# Patient Record
Sex: MALE | Race: WHITE | NOT HISPANIC OR LATINO | Employment: OTHER | URBAN - NONMETROPOLITAN AREA
[De-identification: names, ages, dates, MRNs, and addresses within clinical notes are randomized per-mention and may not be internally consistent; named-entity substitution may affect disease eponyms.]

---

## 2023-03-20 PROBLEM — M25.551 RIGHT HIP PAIN: Status: ACTIVE | Noted: 2023-03-20

## 2023-03-20 PROBLEM — M25.552 LEFT HIP PAIN: Status: ACTIVE | Noted: 2023-03-20

## 2023-03-20 PROBLEM — M54.2 NECK PAIN: Status: ACTIVE | Noted: 2023-03-20

## 2023-03-20 PROBLEM — R39.15 URINARY URGENCY: Status: ACTIVE | Noted: 2023-03-20

## 2023-03-20 PROBLEM — N20.0 CALCULUS OF KIDNEY: Status: ACTIVE | Noted: 2023-03-20

## 2023-03-20 PROBLEM — N13.8 BENIGN PROSTATIC HYPERPLASIA WITH URINARY OBSTRUCTION AND OTHER LOWER URINARY TRACT SYMPTOMS: Status: ACTIVE | Noted: 2023-03-20

## 2023-03-20 PROBLEM — H40.9 GLAUCOMA: Status: ACTIVE | Noted: 2023-03-20

## 2023-03-20 PROBLEM — R30.0 DYSURIA: Status: ACTIVE | Noted: 2023-03-20

## 2023-03-20 PROBLEM — E87.6 HYPOKALEMIA: Status: ACTIVE | Noted: 2023-03-20

## 2023-03-20 PROBLEM — R97.20 ELEVATED PSA: Status: ACTIVE | Noted: 2023-03-20

## 2023-03-20 PROBLEM — R74.01 ELEVATED ALT MEASUREMENT: Status: ACTIVE | Noted: 2023-03-20

## 2023-03-20 PROBLEM — R35.1 NOCTURIA: Status: ACTIVE | Noted: 2023-03-20

## 2023-03-20 PROBLEM — R10.31 SEVERE RIGHT INGUINAL PAIN: Status: ACTIVE | Noted: 2023-03-20

## 2023-03-20 PROBLEM — Z99.89 AMBULATES WITH CANE: Status: ACTIVE | Noted: 2023-03-20

## 2023-03-20 PROBLEM — N18.30 STAGE 3 CHRONIC KIDNEY DISEASE (MULTI): Status: ACTIVE | Noted: 2023-03-20

## 2023-03-20 PROBLEM — R33.9 URINARY RETENTION: Status: ACTIVE | Noted: 2023-03-20

## 2023-03-20 PROBLEM — N52.9 ERECTILE DYSFUNCTION: Status: ACTIVE | Noted: 2023-03-20

## 2023-03-20 PROBLEM — K59.00 CONSTIPATION: Status: ACTIVE | Noted: 2023-03-20

## 2023-03-20 PROBLEM — N40.1 BENIGN PROSTATIC HYPERPLASIA WITH URINARY OBSTRUCTION AND OTHER LOWER URINARY TRACT SYMPTOMS: Status: ACTIVE | Noted: 2023-03-20

## 2023-03-20 PROBLEM — R35.0 URINARY FREQUENCY: Status: ACTIVE | Noted: 2023-03-20

## 2023-03-20 PROBLEM — E80.6 HYPERBILIRUBINEMIA: Status: ACTIVE | Noted: 2023-03-20

## 2023-03-20 PROBLEM — M43.02 CERVICAL SPONDYLOLYSIS: Status: ACTIVE | Noted: 2023-03-20

## 2023-03-20 PROBLEM — Z85.038 HISTORY OF COLON CANCER: Status: ACTIVE | Noted: 2023-03-20

## 2023-03-20 PROBLEM — N28.1 COMPLEX RENAL CYST: Status: ACTIVE | Noted: 2023-03-20

## 2023-03-20 PROBLEM — H35.30 MACULAR DEGENERATION: Status: ACTIVE | Noted: 2023-03-20

## 2023-03-20 PROBLEM — I10 HTN (HYPERTENSION): Status: ACTIVE | Noted: 2023-03-20

## 2023-03-20 RX ORDER — LOSARTAN POTASSIUM AND HYDROCHLOROTHIAZIDE 25; 100 MG/1; MG/1
1 TABLET ORAL DAILY
COMMUNITY
End: 2023-10-09 | Stop reason: SDUPTHER

## 2023-03-20 RX ORDER — POTASSIUM CHLORIDE 1500 MG/1
1 TABLET, EXTENDED RELEASE ORAL DAILY
COMMUNITY
Start: 2020-03-17 | End: 2024-03-25 | Stop reason: ALTCHOICE

## 2023-03-20 RX ORDER — FINASTERIDE 5 MG/1
1 TABLET, FILM COATED ORAL
COMMUNITY
Start: 2019-12-18 | End: 2024-01-16

## 2023-03-20 RX ORDER — TAMSULOSIN HYDROCHLORIDE 0.4 MG/1
1 CAPSULE ORAL NIGHTLY
COMMUNITY
Start: 2020-05-20 | End: 2024-01-16

## 2023-03-20 RX ORDER — LATANOPROST 50 UG/ML
SOLUTION/ DROPS OPHTHALMIC
COMMUNITY
Start: 2022-08-02

## 2023-03-20 RX ORDER — METOPROLOL TARTRATE 25 MG/1
1 TABLET, FILM COATED ORAL DAILY
COMMUNITY
Start: 2020-12-18 | End: 2023-10-09 | Stop reason: SDUPTHER

## 2023-03-20 RX ORDER — OXYBUTYNIN CHLORIDE 10 MG/1
1 TABLET, EXTENDED RELEASE ORAL DAILY
COMMUNITY
Start: 2021-02-24 | End: 2023-11-27

## 2023-03-20 RX ORDER — BIMATOPROST 0.1 MG/ML
SOLUTION/ DROPS OPHTHALMIC
COMMUNITY
Start: 2022-01-24

## 2023-03-21 ENCOUNTER — OFFICE VISIT (OUTPATIENT)
Dept: PRIMARY CARE | Facility: CLINIC | Age: 88
End: 2023-03-21
Payer: MEDICARE

## 2023-03-21 VITALS
HEIGHT: 66 IN | BODY MASS INDEX: 1.9 KG/M2 | HEART RATE: 67 BPM | OXYGEN SATURATION: 97 % | SYSTOLIC BLOOD PRESSURE: 130 MMHG | WEIGHT: 11.8 LBS | DIASTOLIC BLOOD PRESSURE: 70 MMHG

## 2023-03-21 DIAGNOSIS — M43.9 POSTURAL DEFORMITY: ICD-10-CM

## 2023-03-21 DIAGNOSIS — Z12.5 PROSTATE CANCER SCREENING: ICD-10-CM

## 2023-03-21 DIAGNOSIS — I10 PRIMARY HYPERTENSION: ICD-10-CM

## 2023-03-21 DIAGNOSIS — R74.01 ELEVATED ALT MEASUREMENT: ICD-10-CM

## 2023-03-21 DIAGNOSIS — M54.50 LOW BACK PAIN, UNSPECIFIED BACK PAIN LATERALITY, UNSPECIFIED CHRONICITY, UNSPECIFIED WHETHER SCIATICA PRESENT: Primary | ICD-10-CM

## 2023-03-21 DIAGNOSIS — R26.81 UNSTABLE GAIT: ICD-10-CM

## 2023-03-21 DIAGNOSIS — Z13.220 SCREENING FOR HYPERLIPIDEMIA: ICD-10-CM

## 2023-03-21 PROCEDURE — 1159F MED LIST DOCD IN RCRD: CPT | Performed by: STUDENT IN AN ORGANIZED HEALTH CARE EDUCATION/TRAINING PROGRAM

## 2023-03-21 PROCEDURE — 3075F SYST BP GE 130 - 139MM HG: CPT | Performed by: STUDENT IN AN ORGANIZED HEALTH CARE EDUCATION/TRAINING PROGRAM

## 2023-03-21 PROCEDURE — 3078F DIAST BP <80 MM HG: CPT | Performed by: STUDENT IN AN ORGANIZED HEALTH CARE EDUCATION/TRAINING PROGRAM

## 2023-03-21 PROCEDURE — 99214 OFFICE O/P EST MOD 30 MIN: CPT | Performed by: STUDENT IN AN ORGANIZED HEALTH CARE EDUCATION/TRAINING PROGRAM

## 2023-03-21 PROCEDURE — 1036F TOBACCO NON-USER: CPT | Performed by: STUDENT IN AN ORGANIZED HEALTH CARE EDUCATION/TRAINING PROGRAM

## 2023-03-21 ASSESSMENT — PATIENT HEALTH QUESTIONNAIRE - PHQ9
1. LITTLE INTEREST OR PLEASURE IN DOING THINGS: NOT AT ALL
SUM OF ALL RESPONSES TO PHQ9 QUESTIONS 1 AND 2: 0
2. FEELING DOWN, DEPRESSED OR HOPELESS: NOT AT ALL

## 2023-03-21 NOTE — PROGRESS NOTES
Subjective   Patient ID: Tommy Richmond is a 88 y.o. male who presents for med check (Medication check, /Leaning to one side (left side) ).    HPI    Patient here for follow up .     Reports that he is noticing that he is leaning  more to the left in the recent past. Progressively worsening. Unable to stand straight despite his efforts. Concerned about this.    Plan: xrays ordered. May need spine surgeon referral. Physical therapy ordered.     He is also helping his wife who has Alzheimer's disease. Reports that he wants to be as healthy as possible because he used to take care of both of them.     Reviewed previous labs. Elevated ALT. Repeat liver function shows elevated bilirubin along with elevated ALT. Ultrasound of the liver is ordered for further evaluation. Ultrasound of the liver did not show any abnormalities. However they cannot see the gallbladder. Patient reports that he has not had any cholecystectomy. Found renal calculi and right renal cyst. We will continue to monitor on this. Patient denies any symptoms at this time.     Hypertension: Blood pressure is under control. Denies chest pain, palpitation or shortness of breath.  We will continue current regimen    Review of Systems  ROS negative except discussed above in HPI.    Vitals:    03/21/23 1015   BP: 130/70   Pulse: 67   SpO2: 97%     Objective   Physical Exam  Marked deviation to the left is noticed. Unable to straiten despite efforts.     Assessment/Plan   Tommy was seen today for med check.  Diagnoses and all orders for this visit:  Low back pain, unspecified back pain laterality, unspecified chronicity, unspecified whether sciatica present (Primary)  -     Referral to Physical Therapy; Future  -     Cancel: XR thoracolumbar spine 2 views; Future  -     Cancel: XR thoracolumbar spine 2 views  -     XR lumbar spine 2-3 views  Primary hypertension  Elevated ALT measurement  -     Comprehensive Metabolic Panel; Future  Screening for  hyperlipidemia  -     Lipid Panel; Future  Prostate cancer screening  -     Prostate Specific Antigen, Screen; Future  Unstable gait  -     Referral to Physical Therapy; Future  Postural deformity  -     Referral to Physical Therapy; Future      Follow up in 6 months.     Brandan Mcclendon MD MPH

## 2023-04-27 ENCOUNTER — TELEPHONE (OUTPATIENT)
Dept: PRIMARY CARE | Facility: CLINIC | Age: 88
End: 2023-04-27

## 2023-09-01 ENCOUNTER — TELEMEDICINE (OUTPATIENT)
Dept: PRIMARY CARE | Facility: CLINIC | Age: 88
End: 2023-09-01
Payer: MEDICARE

## 2023-09-01 DIAGNOSIS — U07.1 COVID-19 VIRUS INFECTION: Primary | ICD-10-CM

## 2023-09-01 DIAGNOSIS — R05.1 ACUTE COUGH: ICD-10-CM

## 2023-09-01 PROCEDURE — 99213 OFFICE O/P EST LOW 20 MIN: CPT | Performed by: STUDENT IN AN ORGANIZED HEALTH CARE EDUCATION/TRAINING PROGRAM

## 2023-09-01 RX ORDER — BENZONATATE 200 MG/1
200 CAPSULE ORAL 3 TIMES DAILY PRN
Qty: 42 CAPSULE | Refills: 0 | Status: SHIPPED | OUTPATIENT
Start: 2023-09-01 | End: 2023-10-01

## 2023-09-01 ASSESSMENT — PATIENT HEALTH QUESTIONNAIRE - PHQ9
SUM OF ALL RESPONSES TO PHQ9 QUESTIONS 1 AND 2: 0
1. LITTLE INTEREST OR PLEASURE IN DOING THINGS: NOT AT ALL
2. FEELING DOWN, DEPRESSED OR HOPELESS: NOT AT ALL

## 2023-09-01 NOTE — PROGRESS NOTES
Subjective   Patient ID: Tommy Richmond is a 88 y.o. male who presents for Sick (PT and wife tested positive for COVID, does not know when sx started, he was in contact with son who has it, tested today and he has it. Some SOB for both. Some cough and congestion. ).    HPI    Patient presented with positive COVID test recently.  Reports that he has mild shortness of breath.  Has a dry cough as well.  Does not have a pulse oximeter to check oxygen saturation.  Denies nausea, vomiting or diarrhea.    Recommended to seek immediate medical attention if his shortness of breath is worsening.  Also recommended to get a pulse oximeter to check his oxygen level.  Discussed about Paxlovid which patient is agreeable to take.  Discussed about the potential side effects of the medication.    Review of Systems  ROS negative except discussed above in HPI.    There were no vitals filed for this visit.  Objective   Physical Exam  Unable to examine due to nature of the visit and    Assessment/Plan   Tommy was seen today for sick.  Diagnoses and all orders for this visit:  COVID-19 virus infection (Primary)  -     nirmatrelvir-ritonavir (PAXLOVID) 300 mg (150 mg x 2)-100 mg tablet therapy pack; Take 3 tablets by mouth 2 times a day for 5 days. Follow the instructions on the package  -     benzonatate (Tessalon) 200 mg capsule; Take 1 capsule (200 mg) by mouth 3 times a day as needed for cough. Do not crush or chew.  Acute cough  -     nirmatrelvir-ritonavir (PAXLOVID) 300 mg (150 mg x 2)-100 mg tablet therapy pack; Take 3 tablets by mouth 2 times a day for 5 days. Follow the instructions on the package  -     benzonatate (Tessalon) 200 mg capsule; Take 1 capsule (200 mg) by mouth 3 times a day as needed for cough. Do not crush or chew.      Follow up as needed         Brandan Mcclendon MD MPH

## 2023-09-19 ENCOUNTER — OFFICE VISIT (OUTPATIENT)
Dept: PRIMARY CARE | Facility: CLINIC | Age: 88
End: 2023-09-19
Payer: MEDICARE

## 2023-09-19 VITALS — DIASTOLIC BLOOD PRESSURE: 60 MMHG | OXYGEN SATURATION: 99 % | SYSTOLIC BLOOD PRESSURE: 110 MMHG | HEART RATE: 66 BPM

## 2023-09-19 DIAGNOSIS — R53.83 OTHER FATIGUE: ICD-10-CM

## 2023-09-19 DIAGNOSIS — Z13.31 DEPRESSION SCREENING: ICD-10-CM

## 2023-09-19 DIAGNOSIS — F32.A DEPRESSION, UNSPECIFIED DEPRESSION TYPE: ICD-10-CM

## 2023-09-19 DIAGNOSIS — Z71.89 ADVANCE CARE PLANNING: ICD-10-CM

## 2023-09-19 DIAGNOSIS — Z00.00 ROUTINE GENERAL MEDICAL EXAMINATION AT HEALTH CARE FACILITY: Primary | ICD-10-CM

## 2023-09-19 DIAGNOSIS — F41.9 ANXIETY: ICD-10-CM

## 2023-09-19 PROCEDURE — 99497 ADVNCD CARE PLAN 30 MIN: CPT | Performed by: STUDENT IN AN ORGANIZED HEALTH CARE EDUCATION/TRAINING PROGRAM

## 2023-09-19 PROCEDURE — 99214 OFFICE O/P EST MOD 30 MIN: CPT | Performed by: STUDENT IN AN ORGANIZED HEALTH CARE EDUCATION/TRAINING PROGRAM

## 2023-09-19 PROCEDURE — 1160F RVW MEDS BY RX/DR IN RCRD: CPT | Performed by: STUDENT IN AN ORGANIZED HEALTH CARE EDUCATION/TRAINING PROGRAM

## 2023-09-19 PROCEDURE — 3078F DIAST BP <80 MM HG: CPT | Performed by: STUDENT IN AN ORGANIZED HEALTH CARE EDUCATION/TRAINING PROGRAM

## 2023-09-19 PROCEDURE — 1170F FXNL STATUS ASSESSED: CPT | Performed by: STUDENT IN AN ORGANIZED HEALTH CARE EDUCATION/TRAINING PROGRAM

## 2023-09-19 PROCEDURE — 1158F ADVNC CARE PLAN TLK DOCD: CPT | Performed by: STUDENT IN AN ORGANIZED HEALTH CARE EDUCATION/TRAINING PROGRAM

## 2023-09-19 PROCEDURE — 1036F TOBACCO NON-USER: CPT | Performed by: STUDENT IN AN ORGANIZED HEALTH CARE EDUCATION/TRAINING PROGRAM

## 2023-09-19 PROCEDURE — 3074F SYST BP LT 130 MM HG: CPT | Performed by: STUDENT IN AN ORGANIZED HEALTH CARE EDUCATION/TRAINING PROGRAM

## 2023-09-19 PROCEDURE — G0439 PPPS, SUBSEQ VISIT: HCPCS | Performed by: STUDENT IN AN ORGANIZED HEALTH CARE EDUCATION/TRAINING PROGRAM

## 2023-09-19 PROCEDURE — 1159F MED LIST DOCD IN RCRD: CPT | Performed by: STUDENT IN AN ORGANIZED HEALTH CARE EDUCATION/TRAINING PROGRAM

## 2023-09-19 PROCEDURE — G0444 DEPRESSION SCREEN ANNUAL: HCPCS | Performed by: STUDENT IN AN ORGANIZED HEALTH CARE EDUCATION/TRAINING PROGRAM

## 2023-09-19 RX ORDER — SERTRALINE HYDROCHLORIDE 25 MG/1
25 TABLET, FILM COATED ORAL DAILY
Qty: 30 TABLET | Refills: 1 | Status: SHIPPED | OUTPATIENT
Start: 2023-09-19 | End: 2023-10-10 | Stop reason: ALTCHOICE

## 2023-09-19 RX ORDER — HYDROXYZINE HYDROCHLORIDE 25 MG/1
25 TABLET, FILM COATED ORAL EVERY 8 HOURS PRN
Qty: 60 TABLET | Refills: 0 | Status: SHIPPED | OUTPATIENT
Start: 2023-09-19 | End: 2024-04-29

## 2023-09-19 ASSESSMENT — ACTIVITIES OF DAILY LIVING (ADL)
MANAGING_FINANCES: NEEDS ASSISTANCE
TAKING_MEDICATION: INDEPENDENT
DRESSING: INDEPENDENT
GROCERY_SHOPPING: NEEDS ASSISTANCE
BATHING: INDEPENDENT
DOING_HOUSEWORK: NEEDS ASSISTANCE

## 2023-09-19 ASSESSMENT — ENCOUNTER SYMPTOMS
LOSS OF SENSATION IN FEET: 0
OCCASIONAL FEELINGS OF UNSTEADINESS: 1
DEPRESSION: 1

## 2023-09-19 NOTE — PROGRESS NOTES
.Subjective   Reason for Visit: Tommy Richmond is an 88 y.o. male here for a Medicare Wellness visit.     Past Medical, Surgical, and Family History reviewed and updated in chart.         Chief Complaint   Patient presents with    Medicare Annual Wellness Visit Subsequent     6 mth ov  Discuss, depression, dehydration. Has panic attacks. Tested positive for covid 6-7 days ago     HPI    Patient is here for follow up.     Depression: Family noticed that the patient is depressed.   Starting Zoloft.     Anxiety: Atarax as needed.     Dehydration: has been trying to increase fluid intake.     Depression Screening done  Advanced Directives Discussion Completed    Patient Care Team:  Brandan Mcclendon MD MPH as PCP - General  Brandan Mcclendon MD MPH as PCP - [employer] PCP     Review of Systems  ROS negative except discussed above in HPI.    Objective   Vitals:  /60 (BP Location: Left arm, Patient Position: Sitting)   Pulse 66   SpO2 99%       Physical Exam  Constitutional:       Appearance: Normal appearance.   Cardiovascular:      Rate and Rhythm: Normal rate and regular rhythm.   Pulmonary:      Effort: Pulmonary effort is normal.      Breath sounds: Normal breath sounds.   Musculoskeletal:      Cervical back: Normal range of motion and neck supple.   Lymphadenopathy:      Cervical: No cervical adenopathy.   Neurological:      Mental Status: He is alert.       Assessment/Plan   Problem List Items Addressed This Visit    None  Visit Diagnoses       Routine general medical examination at health care facility    -  Primary    Depression, unspecified depression type        Relevant Medications    sertraline (Zoloft) 25 mg tablet    Anxiety        Relevant Medications    hydrOXYzine HCL (Atarax) 25 mg tablet    Other fatigue        Relevant Orders    Comprehensive Metabolic Panel    CBC and Auto Differential    Depression screening [Z13.31]        Advance care planning [Z71.89]                   Follow up in 3-4 weeks.

## 2023-09-19 NOTE — ACP (ADVANCE CARE PLANNING)
Confirming Previous Code Status:   Advance Care Planning Note     Discussion Date: 09/19/23   Discussion Participants: patient    The patient wishes to discuss Advance Care Planning today and the following is a brief summary of our discussion.     Patient has capacity to make their own medical decisions: Yes  Health Care Agent/Surrogate Decision Maker documented in chart: Yes    Documents on file and valid:  Advance Directive/Living Will: Yes   Health Care Power of : Yes    Communication of Medical Status/Prognosis:   Martine Richmond      Communication of Treatment Goals/Options:   Martine Richmond     Treatment Decisions  Goals of Care: survival is paramount regardless of prognosis, treatment outcome, or burden     Follow Up Plan  1 year  Team Members   - Urologist.     Brandan Mcclendon MD MPH  9/19/2023 3:46 PM

## 2023-09-19 NOTE — PATIENT INSTRUCTIONS
You can look into Rollators, so that you can use it to carry things around and can sit if needed.

## 2023-09-20 ENCOUNTER — LAB (OUTPATIENT)
Dept: LAB | Facility: LAB | Age: 88
End: 2023-09-20
Payer: MEDICARE

## 2023-09-20 DIAGNOSIS — R74.01 ELEVATED ALT MEASUREMENT: ICD-10-CM

## 2023-09-20 DIAGNOSIS — Z12.5 PROSTATE CANCER SCREENING: ICD-10-CM

## 2023-09-20 DIAGNOSIS — R53.83 OTHER FATIGUE: ICD-10-CM

## 2023-09-20 DIAGNOSIS — Z13.220 SCREENING FOR HYPERLIPIDEMIA: ICD-10-CM

## 2023-09-20 LAB
ALANINE AMINOTRANSFERASE (SGPT) (U/L) IN SER/PLAS: 62 U/L (ref 10–52)
ALBUMIN (G/DL) IN SER/PLAS: 3.4 G/DL (ref 3.4–5)
ALKALINE PHOSPHATASE (U/L) IN SER/PLAS: 124 U/L (ref 33–136)
ANION GAP IN SER/PLAS: 10 MMOL/L (ref 10–20)
ASPARTATE AMINOTRANSFERASE (SGOT) (U/L) IN SER/PLAS: 44 U/L (ref 9–39)
BASOPHILS (10*3/UL) IN BLOOD BY AUTOMATED COUNT: 0.02 X10E9/L (ref 0–0.1)
BASOPHILS/100 LEUKOCYTES IN BLOOD BY AUTOMATED COUNT: 0.4 % (ref 0–2)
BILIRUBIN TOTAL (MG/DL) IN SER/PLAS: 0.8 MG/DL (ref 0–1.2)
CALCIUM (MG/DL) IN SER/PLAS: 9.1 MG/DL (ref 8.6–10.3)
CARBON DIOXIDE, TOTAL (MMOL/L) IN SER/PLAS: 25 MMOL/L (ref 21–32)
CHLORIDE (MMOL/L) IN SER/PLAS: 106 MMOL/L (ref 98–107)
CHOLESTEROL (MG/DL) IN SER/PLAS: 119 MG/DL (ref 0–199)
CHOLESTEROL IN HDL (MG/DL) IN SER/PLAS: 53 MG/DL
CHOLESTEROL/HDL RATIO: 2.2
CREATININE (MG/DL) IN SER/PLAS: 1.06 MG/DL (ref 0.5–1.3)
EOSINOPHILS (10*3/UL) IN BLOOD BY AUTOMATED COUNT: 0.03 X10E9/L (ref 0–0.4)
EOSINOPHILS/100 LEUKOCYTES IN BLOOD BY AUTOMATED COUNT: 0.5 % (ref 0–6)
ERYTHROCYTE DISTRIBUTION WIDTH (RATIO) BY AUTOMATED COUNT: 14.2 % (ref 11.5–14.5)
ERYTHROCYTE MEAN CORPUSCULAR HEMOGLOBIN CONCENTRATION (G/DL) BY AUTOMATED: 32.7 G/DL (ref 32–36)
ERYTHROCYTE MEAN CORPUSCULAR VOLUME (FL) BY AUTOMATED COUNT: 91 FL (ref 80–100)
ERYTHROCYTES (10*6/UL) IN BLOOD BY AUTOMATED COUNT: 4.33 X10E12/L (ref 4.5–5.9)
GFR MALE: 68 ML/MIN/1.73M2
GLUCOSE (MG/DL) IN SER/PLAS: 100 MG/DL (ref 74–99)
HEMATOCRIT (%) IN BLOOD BY AUTOMATED COUNT: 39.4 % (ref 41–52)
HEMOGLOBIN (G/DL) IN BLOOD: 12.9 G/DL (ref 13.5–17.5)
IMMATURE GRANULOCYTES/100 LEUKOCYTES IN BLOOD BY AUTOMATED COUNT: 0.2 % (ref 0–0.9)
LDL: 51 MG/DL (ref 0–99)
LEUKOCYTES (10*3/UL) IN BLOOD BY AUTOMATED COUNT: 5.6 X10E9/L (ref 4.4–11.3)
LYMPHOCYTES (10*3/UL) IN BLOOD BY AUTOMATED COUNT: 1.2 X10E9/L (ref 0.8–3)
LYMPHOCYTES/100 LEUKOCYTES IN BLOOD BY AUTOMATED COUNT: 21.4 % (ref 13–44)
MONOCYTES (10*3/UL) IN BLOOD BY AUTOMATED COUNT: 0.31 X10E9/L (ref 0.05–0.8)
MONOCYTES/100 LEUKOCYTES IN BLOOD BY AUTOMATED COUNT: 5.5 % (ref 2–10)
NEUTROPHILS (10*3/UL) IN BLOOD BY AUTOMATED COUNT: 4.04 X10E9/L (ref 1.6–5.5)
NEUTROPHILS/100 LEUKOCYTES IN BLOOD BY AUTOMATED COUNT: 72 % (ref 40–80)
PLATELETS (10*3/UL) IN BLOOD AUTOMATED COUNT: 262 X10E9/L (ref 150–450)
POTASSIUM (MMOL/L) IN SER/PLAS: 3.9 MMOL/L (ref 3.5–5.3)
PROSTATE SPECIFIC ANTIGEN,SCREEN: 12.93 NG/ML (ref 0–4)
PROTEIN TOTAL: 5.8 G/DL (ref 6.4–8.2)
SODIUM (MMOL/L) IN SER/PLAS: 137 MMOL/L (ref 136–145)
TRIGLYCERIDE (MG/DL) IN SER/PLAS: 73 MG/DL (ref 0–149)
UREA NITROGEN (MG/DL) IN SER/PLAS: 25 MG/DL (ref 6–23)
VLDL: 15 MG/DL (ref 0–40)

## 2023-09-20 PROCEDURE — 36415 COLL VENOUS BLD VENIPUNCTURE: CPT

## 2023-09-20 PROCEDURE — 85025 COMPLETE CBC W/AUTO DIFF WBC: CPT

## 2023-09-20 PROCEDURE — 80061 LIPID PANEL: CPT

## 2023-09-20 PROCEDURE — G0103 PSA SCREENING: HCPCS

## 2023-09-20 PROCEDURE — 80053 COMPREHEN METABOLIC PANEL: CPT

## 2023-09-25 ENCOUNTER — APPOINTMENT (OUTPATIENT)
Dept: PRIMARY CARE | Facility: CLINIC | Age: 88
End: 2023-09-25
Payer: MEDICARE

## 2023-09-26 ENCOUNTER — TELEPHONE (OUTPATIENT)
Dept: PRIMARY CARE | Facility: CLINIC | Age: 88
End: 2023-09-26
Payer: MEDICARE

## 2023-09-27 NOTE — TELEPHONE ENCOUNTER
Would like to ask some questions about how to take a new medications that he was prescribed- states some of the side effects increase symptoms of glaucoma and he's anxious about that, please call

## 2023-10-05 ENCOUNTER — TELEPHONE (OUTPATIENT)
Dept: PRIMARY CARE | Facility: CLINIC | Age: 88
End: 2023-10-05
Payer: MEDICARE

## 2023-10-05 DIAGNOSIS — I10 HYPERTENSION, UNSPECIFIED TYPE: ICD-10-CM

## 2023-10-05 NOTE — TELEPHONE ENCOUNTER
Patient son called in regarding Zoloft Prescription he was prescribed on his last visit that has side effects with his eyesight (glaucoma & Macular degeneration) would like to discuss other options for medication.

## 2023-10-05 NOTE — TELEPHONE ENCOUNTER
Asking if he can change his 10/10 appointment to a VV instead of in office. Asking for a call back.

## 2023-10-09 RX ORDER — METOPROLOL TARTRATE 25 MG/1
25 TABLET, FILM COATED ORAL DAILY
Qty: 90 TABLET | Refills: 1 | Status: SHIPPED | OUTPATIENT
Start: 2023-10-09 | End: 2023-10-20 | Stop reason: SDUPTHER

## 2023-10-09 RX ORDER — LOSARTAN POTASSIUM AND HYDROCHLOROTHIAZIDE 25; 100 MG/1; MG/1
1 TABLET ORAL DAILY
Qty: 90 TABLET | Refills: 1 | Status: SHIPPED | OUTPATIENT
Start: 2023-10-09 | End: 2023-10-20 | Stop reason: SDUPTHER

## 2023-10-10 ENCOUNTER — TELEMEDICINE (OUTPATIENT)
Dept: PRIMARY CARE | Facility: CLINIC | Age: 88
End: 2023-10-10
Payer: MEDICARE

## 2023-10-10 DIAGNOSIS — F41.9 ANXIETY: Primary | ICD-10-CM

## 2023-10-10 DIAGNOSIS — Z13.31 DEPRESSION SCREENING: ICD-10-CM

## 2023-10-10 PROCEDURE — 99213 OFFICE O/P EST LOW 20 MIN: CPT | Performed by: STUDENT IN AN ORGANIZED HEALTH CARE EDUCATION/TRAINING PROGRAM

## 2023-10-10 RX ORDER — BUSPIRONE HYDROCHLORIDE 5 MG/1
5 TABLET ORAL 2 TIMES DAILY
Qty: 60 TABLET | Refills: 11 | Status: SHIPPED | OUTPATIENT
Start: 2023-10-10 | End: 2024-10-09

## 2023-10-10 NOTE — PROGRESS NOTES
Subjective   Patient ID: Tommy Richmond is a 88 y.o. male who presents for 3 week ov (Wondering if there is other medication options for sertraline.).    HPI    Patient is here for follow-up regarding depression and anxiety.  Reports that he has not started taking Zoloft as there is concern for worsening glaucoma.  He already has glaucoma and was concerned so was not started taking the medication.  Changing the medication to buspirone.  Most other medications had side effect of glaucoma.    Review of Systems  ROS negative except discussed above in HPI.    There were no vitals filed for this visit.  Objective   Physical Exam  Unable to perform examination due to the nature of the visit.    Assessment/Plan   Tommy was seen today for 3 week ov.  Diagnoses and all orders for this visit:  Anxiety (Primary)  -     busPIRone (Buspar) 5 mg tablet; Take 1 tablet (5 mg) by mouth 2 times a day.  Depression screening  -     busPIRone (Buspar) 5 mg tablet; Take 1 tablet (5 mg) by mouth 2 times a day.      Follow up in 4 weeks         Brandan Mcclendon MD MPH

## 2023-10-13 ENCOUNTER — TELEPHONE (OUTPATIENT)
Dept: PRIMARY CARE | Facility: CLINIC | Age: 88
End: 2023-10-13
Payer: MEDICARE

## 2023-10-13 NOTE — TELEPHONE ENCOUNTER
Pt son Yury has a couple questions regarding new medication prescribed for his dad -on the information sheet  for the depression medication it says it is for anxiety and they are not sure why he was prescribed medication for high BP   Please call him to discuss

## 2023-10-20 ENCOUNTER — TELEPHONE (OUTPATIENT)
Dept: PRIMARY CARE | Facility: CLINIC | Age: 88
End: 2023-10-20
Payer: MEDICARE

## 2023-10-20 DIAGNOSIS — I10 HYPERTENSION, UNSPECIFIED TYPE: ICD-10-CM

## 2023-10-20 RX ORDER — METOPROLOL TARTRATE 25 MG/1
25 TABLET, FILM COATED ORAL DAILY
Qty: 90 TABLET | Refills: 1 | Status: SHIPPED | OUTPATIENT
Start: 2023-10-20 | End: 2024-01-12

## 2023-10-20 RX ORDER — LOSARTAN POTASSIUM AND HYDROCHLOROTHIAZIDE 25; 100 MG/1; MG/1
1 TABLET ORAL DAILY
Qty: 90 TABLET | Refills: 1 | Status: SHIPPED | OUTPATIENT
Start: 2023-10-20

## 2023-10-20 NOTE — TELEPHONE ENCOUNTER
Needs a refill on [facility]artan [facility]- states he has been trying to get a refill since 10/9

## 2023-11-01 ENCOUNTER — OFFICE VISIT (OUTPATIENT)
Dept: UROLOGY | Facility: CLINIC | Age: 88
End: 2023-11-01
Payer: MEDICARE

## 2023-11-01 DIAGNOSIS — N39.0 RECURRENT UTI: Primary | ICD-10-CM

## 2023-11-01 PROCEDURE — 1158F ADVNC CARE PLAN TLK DOCD: CPT | Performed by: UROLOGY

## 2023-11-01 PROCEDURE — 3078F DIAST BP <80 MM HG: CPT | Performed by: UROLOGY

## 2023-11-01 PROCEDURE — 3074F SYST BP LT 130 MM HG: CPT | Performed by: UROLOGY

## 2023-11-01 PROCEDURE — 1160F RVW MEDS BY RX/DR IN RCRD: CPT | Performed by: UROLOGY

## 2023-11-01 PROCEDURE — 99211 OFF/OP EST MAY X REQ PHY/QHP: CPT | Performed by: UROLOGY

## 2023-11-01 PROCEDURE — 1036F TOBACCO NON-USER: CPT | Performed by: UROLOGY

## 2023-11-01 PROCEDURE — 1159F MED LIST DOCD IN RCRD: CPT | Performed by: UROLOGY

## 2023-11-01 NOTE — PROGRESS NOTES
Patient ID: Tommy Richmond is a 88 y.o. male.    Procedures   The patient was prepped and draped in the standard fashion.  Lidociane jelly was used on the urethral   Meatus.  A 16 Estonian red rubber catheter was placed into the bladder.  The bladder was drained.  The bladder was  Then irrigated several times until clear without mucous.  Aprroximately 60ml of a saline and _____BETADINE____________  Solutions was left in the bladder.  The catheter was removed and patient  was asked to hold the solution in the  Bladder as long as possible    FOLLOW UP CYSTO IN 1 MONTH

## 2023-11-08 ENCOUNTER — TELEPHONE (OUTPATIENT)
Dept: PRIMARY CARE | Facility: CLINIC | Age: 88
End: 2023-11-08
Payer: MEDICARE

## 2023-11-08 NOTE — TELEPHONE ENCOUNTER
Has an appointment 11/10, but doesn't think he needs the appointment. He thinks it's for the anxiety medication, but hasn't used the medication yet. Asking if he can cancel the appointment. Asking for a call back.

## 2023-11-10 ENCOUNTER — APPOINTMENT (OUTPATIENT)
Dept: PRIMARY CARE | Facility: CLINIC | Age: 88
End: 2023-11-10
Payer: MEDICARE

## 2023-11-22 DIAGNOSIS — R30.0 DYSURIA: ICD-10-CM

## 2023-11-27 RX ORDER — OXYBUTYNIN CHLORIDE 10 MG/1
10 TABLET, EXTENDED RELEASE ORAL DAILY
Qty: 90 TABLET | Refills: 3 | Status: SHIPPED | OUTPATIENT
Start: 2023-11-27 | End: 2024-11-26

## 2023-11-27 NOTE — TELEPHONE ENCOUNTER
[facility] CLD ABOUT PRESCRIPTION FOR   oxybutynin XL (Ditropan-XL) 10 mg 24 hr tablet WANTS A 90 DAY SUPPLY. NEEDS TO HAVE APPROVAL FAXED [phone]

## 2024-01-10 ENCOUNTER — APPOINTMENT (OUTPATIENT)
Dept: UROLOGY | Facility: CLINIC | Age: 89
End: 2024-01-10
Payer: MEDICARE

## 2024-01-10 ENCOUNTER — ANCILLARY PROCEDURE (OUTPATIENT)
Dept: RADIOLOGY | Facility: CLINIC | Age: 89
End: 2024-01-10
Payer: MEDICARE

## 2024-01-10 ENCOUNTER — OFFICE VISIT (OUTPATIENT)
Dept: PRIMARY CARE | Facility: CLINIC | Age: 89
End: 2024-01-10
Payer: MEDICARE

## 2024-01-10 VITALS
DIASTOLIC BLOOD PRESSURE: 70 MMHG | WEIGHT: 154.7 LBS | HEART RATE: 70 BPM | BODY MASS INDEX: 24.97 KG/M2 | SYSTOLIC BLOOD PRESSURE: 132 MMHG | OXYGEN SATURATION: 94 %

## 2024-01-10 DIAGNOSIS — M40.205 KYPHOSIS OF THORACOLUMBAR REGION, UNSPECIFIED KYPHOSIS TYPE: ICD-10-CM

## 2024-01-10 DIAGNOSIS — M54.50 LOW BACK PAIN, UNSPECIFIED BACK PAIN LATERALITY, UNSPECIFIED CHRONICITY, UNSPECIFIED WHETHER SCIATICA PRESENT: ICD-10-CM

## 2024-01-10 DIAGNOSIS — L98.9 SKIN LESION OF RIGHT LOWER EXTREMITY: ICD-10-CM

## 2024-01-10 DIAGNOSIS — M54.50 LOW BACK PAIN, UNSPECIFIED BACK PAIN LATERALITY, UNSPECIFIED CHRONICITY, UNSPECIFIED WHETHER SCIATICA PRESENT: Primary | ICD-10-CM

## 2024-01-10 PROCEDURE — 1158F ADVNC CARE PLAN TLK DOCD: CPT | Performed by: STUDENT IN AN ORGANIZED HEALTH CARE EDUCATION/TRAINING PROGRAM

## 2024-01-10 PROCEDURE — 1036F TOBACCO NON-USER: CPT | Performed by: STUDENT IN AN ORGANIZED HEALTH CARE EDUCATION/TRAINING PROGRAM

## 2024-01-10 PROCEDURE — 1160F RVW MEDS BY RX/DR IN RCRD: CPT | Performed by: STUDENT IN AN ORGANIZED HEALTH CARE EDUCATION/TRAINING PROGRAM

## 2024-01-10 PROCEDURE — 3075F SYST BP GE 130 - 139MM HG: CPT | Performed by: STUDENT IN AN ORGANIZED HEALTH CARE EDUCATION/TRAINING PROGRAM

## 2024-01-10 PROCEDURE — 3078F DIAST BP <80 MM HG: CPT | Performed by: STUDENT IN AN ORGANIZED HEALTH CARE EDUCATION/TRAINING PROGRAM

## 2024-01-10 PROCEDURE — 99214 OFFICE O/P EST MOD 30 MIN: CPT | Performed by: STUDENT IN AN ORGANIZED HEALTH CARE EDUCATION/TRAINING PROGRAM

## 2024-01-10 PROCEDURE — 1159F MED LIST DOCD IN RCRD: CPT | Performed by: STUDENT IN AN ORGANIZED HEALTH CARE EDUCATION/TRAINING PROGRAM

## 2024-01-10 NOTE — PROGRESS NOTES
Subjective   Patient ID: Tommy Richmond is a 88 y.o. male who presents for Pain (PT is here today for right ankle pain).    HPI    Skin lesion in the right ankle: Reports that he noticed skin lesion in the right ankle area. This is growing slowly. Reports he noticed some blood but does not know where it came from. Has mild pain associated with it in the area. Reports last night it started to feel better,   Plan: Bleeding likely from the wound. Skin lesion is about 1 x 2 cm with significant scab on the wound. Discussed conservative measures. Referral to dermatology.     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 Cannot straighten anymore.Unable to see things in the shelves.  reports he would also like an x-ray of his back, does not want to do PT feels like it does not work well.  Plan: xray ordered. Referral to spine surgeon.     Review of Systems  ROS negative except discussed above in HPI.    Vitals:    01/10/24 1124   BP: 132/70   Pulse: 70   SpO2: 94%     Objective   Physical Exam  Musculoskeletal:      Comments: Significant kyphosis noticed in both thoracic and lumbar spine. Unstable gait.   Uses walker to ambulate.    Skin:     Comments: Skin lesion above the lateral malleolar area of the right ankle. Lesion is covered with thick scab. No active bleeding is noticed.    Neurological:      Mental Status: He is alert.       Assessment/Plan   Tommy was seen today for pain.  Diagnoses and all orders for this visit:  Low back pain, unspecified back pain laterality, unspecified chronicity, unspecified whether sciatica present (Primary)  -     XR thoracic spine 3 views; Future  -     XR lumbar spine 4+ views w flexion extension; Future  -     Referral to Spine Surgery; Future  Kyphosis of thoracolumbar region, unspecified kyphosis type  -     XR thoracic spine 3 views; Future  -     XR lumbar spine 4+ views w flexion extension; Future  -     Referral to Spine Surgery; Future  Skin  lesion of right lower extremity  -     Referral to Dermatology      Follow up in 3 months.      Brandan Mcclendon MD MPH

## 2024-01-11 ENCOUNTER — TELEPHONE (OUTPATIENT)
Dept: PRIMARY CARE | Facility: CLINIC | Age: 89
End: 2024-01-11
Payer: MEDICARE

## 2024-01-11 NOTE — TELEPHONE ENCOUNTER
Patient tried to get xray at [facility] location of his spine, but the technition said he's too bent over. With Dr. Mcclendon being out asking if they should wait until he's back in the office tomorrow to try again. Asking for a call back at [phone]

## 2024-01-12 ENCOUNTER — APPOINTMENT (OUTPATIENT)
Dept: PRIMARY CARE | Facility: CLINIC | Age: 89
End: 2024-01-12
Payer: MEDICARE

## 2024-01-12 DIAGNOSIS — I10 HYPERTENSION, UNSPECIFIED TYPE: ICD-10-CM

## 2024-01-12 DIAGNOSIS — N40.1 BENIGN PROSTATIC HYPERPLASIA WITH URINARY OBSTRUCTION AND OTHER LOWER URINARY TRACT SYMPTOMS: ICD-10-CM

## 2024-01-12 DIAGNOSIS — N13.8 BENIGN PROSTATIC HYPERPLASIA WITH URINARY OBSTRUCTION AND OTHER LOWER URINARY TRACT SYMPTOMS: ICD-10-CM

## 2024-01-12 RX ORDER — POTASSIUM CHLORIDE 20 MEQ/1
20 TABLET, EXTENDED RELEASE ORAL DAILY
Qty: 90 TABLET | Refills: 3 | Status: SHIPPED | OUTPATIENT
Start: 2024-01-12

## 2024-01-12 RX ORDER — METOPROLOL TARTRATE 25 MG/1
25 TABLET, FILM COATED ORAL DAILY
Qty: 90 TABLET | Refills: 3 | Status: SHIPPED | OUTPATIENT
Start: 2024-01-12

## 2024-01-15 ENCOUNTER — TELEPHONE (OUTPATIENT)
Dept: PRIMARY CARE | Facility: CLINIC | Age: 89
End: 2024-01-15
Payer: MEDICARE

## 2024-01-16 RX ORDER — FINASTERIDE 5 MG/1
5 TABLET, FILM COATED ORAL DAILY
Qty: 90 TABLET | Refills: 3 | Status: SHIPPED | OUTPATIENT
Start: 2024-01-16

## 2024-01-16 RX ORDER — TAMSULOSIN HYDROCHLORIDE 0.4 MG/1
0.4 CAPSULE ORAL NIGHTLY
Qty: 90 CAPSULE | Refills: 3 | Status: SHIPPED | OUTPATIENT
Start: 2024-01-16

## 2024-01-18 NOTE — TELEPHONE ENCOUNTER
Patient would very much like a call back about xrays needed. We have been waiting on xray to get back with us about recommended test needed

## 2024-01-19 DIAGNOSIS — M40.205 KYPHOSIS OF THORACOLUMBAR REGION, UNSPECIFIED KYPHOSIS TYPE: ICD-10-CM

## 2024-01-19 DIAGNOSIS — M54.50 LOW BACK PAIN, UNSPECIFIED BACK PAIN LATERALITY, UNSPECIFIED CHRONICITY, UNSPECIFIED WHETHER SCIATICA PRESENT: Primary | ICD-10-CM

## 2024-01-22 ENCOUNTER — HOSPITAL ENCOUNTER (OUTPATIENT)
Dept: RADIOLOGY | Facility: CLINIC | Age: 89
Discharge: HOME | End: 2024-01-22
Payer: MEDICARE

## 2024-01-22 ENCOUNTER — HOSPITAL ENCOUNTER (OUTPATIENT)
Dept: RADIOLOGY | Facility: HOSPITAL | Age: 89
Discharge: HOME | End: 2024-01-22
Payer: MEDICARE

## 2024-01-22 DIAGNOSIS — M40.205 KYPHOSIS OF THORACOLUMBAR REGION, UNSPECIFIED KYPHOSIS TYPE: ICD-10-CM

## 2024-01-22 DIAGNOSIS — M54.50 LOW BACK PAIN, UNSPECIFIED BACK PAIN LATERALITY, UNSPECIFIED CHRONICITY, UNSPECIFIED WHETHER SCIATICA PRESENT: ICD-10-CM

## 2024-01-22 PROCEDURE — 72072 X-RAY EXAM THORAC SPINE 3VWS: CPT | Performed by: RADIOLOGY

## 2024-01-22 PROCEDURE — 72100 X-RAY EXAM L-S SPINE 2/3 VWS: CPT | Performed by: RADIOLOGY

## 2024-01-22 PROCEDURE — 72072 X-RAY EXAM THORAC SPINE 3VWS: CPT

## 2024-01-22 PROCEDURE — 72100 X-RAY EXAM L-S SPINE 2/3 VWS: CPT

## 2024-01-23 NOTE — PROGRESS NOTES
RECURRENT UTI  Patient ID: Tommy Richmond is a 89 y.o. male.    Procedures  The patient was prepped using a Betadine solution. Lidocaine jelly was instilled into the urethra. The flexible cystoscope was sterilely inserted into the urethra and formal cystoscopy performed in a systematic fashion. . For detailed findings of the procedure, please see Dr. Cavanaugh remarks below  CIPRO 250MG POBID TIMES 3 DAYS GIVEN    PSA 12.93(09/20/2023)   10.20 (3/9/2022)   7.64 (9/7/2021  PATIENT HAS HAD 2 NEGATIVE BIOPSIES        No mass. Large prostate with median lobe, no stone. Some mucous but improved from prior visits    Continue Irrigations-no recent UTI    F/U 3 /24 with psa.

## 2024-01-24 ENCOUNTER — PROCEDURE VISIT (OUTPATIENT)
Dept: UROLOGY | Facility: CLINIC | Age: 89
End: 2024-01-24
Payer: MEDICARE

## 2024-01-24 DIAGNOSIS — N39.0 RECURRENT UTI: Primary | ICD-10-CM

## 2024-01-24 PROCEDURE — 52000 CYSTOURETHROSCOPY: CPT | Performed by: UROLOGY

## 2024-01-24 RX ORDER — CIPROFLOXACIN 250 MG/1
250 TABLET, FILM COATED ORAL 2 TIMES DAILY
Qty: 6 TABLET | Refills: 0 | Status: SHIPPED | OUTPATIENT
Start: 2024-01-24 | End: 2024-01-27

## 2024-01-29 DIAGNOSIS — M85.80 OSTEOPENIA, UNSPECIFIED LOCATION: Primary | ICD-10-CM

## 2024-01-29 DIAGNOSIS — M48.50XA: ICD-10-CM

## 2024-01-31 ENCOUNTER — HOSPITAL ENCOUNTER (OUTPATIENT)
Dept: RADIOLOGY | Facility: CLINIC | Age: 89
Discharge: HOME | End: 2024-01-31
Payer: MEDICARE

## 2024-01-31 DIAGNOSIS — M85.80 OSTEOPENIA, UNSPECIFIED LOCATION: ICD-10-CM

## 2024-01-31 DIAGNOSIS — M48.50XA: ICD-10-CM

## 2024-01-31 PROCEDURE — 77080 DXA BONE DENSITY AXIAL: CPT

## 2024-02-02 ENCOUNTER — TELEPHONE (OUTPATIENT)
Dept: PRIMARY CARE | Facility: CLINIC | Age: 89
End: 2024-02-02
Payer: MEDICARE

## 2024-02-02 DIAGNOSIS — M81.0 OSTEOPOROSIS, UNSPECIFIED OSTEOPOROSIS TYPE, UNSPECIFIED PATHOLOGICAL FRACTURE PRESENCE: Primary | ICD-10-CM

## 2024-02-02 RX ORDER — ALENDRONATE SODIUM 70 MG/1
70 TABLET ORAL
Qty: 4 TABLET | Refills: 11 | Status: SHIPPED | OUTPATIENT
Start: 2024-02-02 | End: 2025-02-01

## 2024-02-02 NOTE — TELEPHONE ENCOUNTER
Patient called and said that the medication [facility] were [facility] never got [facility] or received.   I told him I would send a message over to the MA.   He requested a call back to know it was sent.   Thank you

## 2024-03-14 ASSESSMENT — ENCOUNTER SYMPTOMS
SHORTNESS OF BREATH: 0
FEVER: 0
ENDOCRINE NEGATIVE: 1
CHILLS: 0
PSYCHIATRIC NEGATIVE: 1
DIFFICULTY URINATING: 0
COUGH: 0
ALLERGIC/IMMUNOLOGIC NEGATIVE: 1
EYES NEGATIVE: 1
NAUSEA: 0

## 2024-03-14 NOTE — PROGRESS NOTES
Subjective   Patient ID: Tommy Richmond is a 89 y.o. male.    HPI  Chronic Hx of elevated PSA...No recent PSA was done. Prior  PSA was 12.93 on . Prior PSA was 10.0 (3/22) Previous PSA was 7.64 on 2021. Prior PSA was 9.19 on 2021...Patient has had 2 negative bx done in the past 10 to 15 years ago by Dr. Lr....FHX of prostate CA (father and brother)....No bone pain...No weight loss.. Hx of recurrent UTI'S. He was doing betadine irrigations in the past. He thinks these have been very helpful. Chronic BPH sx are mild and stable. Some urgency and frequency. Denies dysuria. Denies hematuria. Nocturia x2-4. . He is taking Flomax, finasteride and Ditropan 10mg. Hx of renal cyst. Recent U/S on 2021 showed slight  in size of left renal cyst. Chronic hx of bladder stones and kidney stones. No recent sx. Normal cysto on 24. ED is chronic. No medication for this.          Review of Systems   Constitutional:  Negative for chills and fever.   HENT: Negative.     Eyes: Negative.    Respiratory:  Negative for cough and shortness of breath.    Cardiovascular:  Negative for chest pain and leg swelling.   Gastrointestinal:  Negative for nausea.   Endocrine: Negative.    Genitourinary:  Negative for difficulty urinating.        Negative except for documented in HPI   Allergic/Immunologic: Negative.    Neurological:         Alert & oriented X 3   Hematological:         Denies blood thinners   Psychiatric/Behavioral: Negative.         Objective   Physical Exam  Vitals and nursing note reviewed.   Constitutional:       General: He is not in acute distress.     Appearance: Normal appearance.   Pulmonary:      Effort: Pulmonary effort is normal.   Abdominal:      Tenderness: There is no abdominal tenderness.   Genitourinary:     Comments: Kidneys non palpable bilaterally  Bladder non palpable or tender  Scrotum no mass, No hydrocele  Epididymis- No spermatocele. Non Tender.  Testicles: No mass  Urethra: No  discharge  Penis within normal limits... No lesions  Prostate - unable to stand  In wheelchair  Neurological:      Mental Status: He is alert.         Assessment/Plan   Diagnoses and all orders for this visit:  Recurrent UTI  Elevated PSA  -     Prostate Specific Antigen; Future  Nocturia  Urinary urgency  Benign prostatic hyperplasia with urinary obstruction and other lower urinary tract symptoms      All available PSA values reviewed, Options discussed. Questions answered.  New PSA ordered   Diet changes for prostate health discussed and educational information given. Pros/Cons of prostate health supplements discussed.   Treatment options for LUTS reviewed  Flomax and Proscar Rx refilled  Discussed timed voiding. Discussed fluid and caffeine intake  Treatment options for ED reviewed.  Lifestyle change to help prevent UTIs discussed. Encouraged fluid intake.  Patient pleased with irrigations and has had no UTI Sx  Will change irrigtations to [id number]  Last cysto notes reviewed    F/U  betadine irrigation

## 2024-03-20 ENCOUNTER — OFFICE VISIT (OUTPATIENT)
Dept: UROLOGY | Facility: CLINIC | Age: 89
End: 2024-03-20
Payer: MEDICARE

## 2024-03-20 VITALS — BODY MASS INDEX: 23.78 KG/M2 | HEIGHT: 66 IN | RESPIRATION RATE: 16 BRPM | WEIGHT: 148 LBS

## 2024-03-20 DIAGNOSIS — N40.1 BENIGN PROSTATIC HYPERPLASIA WITH URINARY OBSTRUCTION AND OTHER LOWER URINARY TRACT SYMPTOMS: ICD-10-CM

## 2024-03-20 DIAGNOSIS — R97.20 ELEVATED PSA: ICD-10-CM

## 2024-03-20 DIAGNOSIS — N39.0 RECURRENT UTI: ICD-10-CM

## 2024-03-20 DIAGNOSIS — R39.15 URINARY URGENCY: ICD-10-CM

## 2024-03-20 DIAGNOSIS — N13.8 BENIGN PROSTATIC HYPERPLASIA WITH URINARY OBSTRUCTION AND OTHER LOWER URINARY TRACT SYMPTOMS: ICD-10-CM

## 2024-03-20 DIAGNOSIS — R35.1 NOCTURIA: ICD-10-CM

## 2024-03-20 PROCEDURE — 99214 OFFICE O/P EST MOD 30 MIN: CPT | Performed by: UROLOGY

## 2024-03-20 PROCEDURE — 1159F MED LIST DOCD IN RCRD: CPT | Performed by: UROLOGY

## 2024-03-20 PROCEDURE — 1036F TOBACCO NON-USER: CPT | Performed by: UROLOGY

## 2024-03-25 ENCOUNTER — LAB (OUTPATIENT)
Dept: LAB | Facility: LAB | Age: 89
End: 2024-03-25
Payer: MEDICARE

## 2024-03-25 ENCOUNTER — HOSPITAL ENCOUNTER (OUTPATIENT)
Dept: RADIOLOGY | Facility: CLINIC | Age: 89
Discharge: HOME | End: 2024-03-25
Payer: MEDICARE

## 2024-03-25 ENCOUNTER — OFFICE VISIT (OUTPATIENT)
Dept: PRIMARY CARE | Facility: CLINIC | Age: 89
End: 2024-03-25
Payer: MEDICARE

## 2024-03-25 VITALS
BODY MASS INDEX: 23.37 KG/M2 | SYSTOLIC BLOOD PRESSURE: 128 MMHG | WEIGHT: 144.8 LBS | HEART RATE: 67 BPM | DIASTOLIC BLOOD PRESSURE: 78 MMHG | OXYGEN SATURATION: 96 %

## 2024-03-25 DIAGNOSIS — R06.02 SHORTNESS OF BREATH: ICD-10-CM

## 2024-03-25 DIAGNOSIS — R05.1 ACUTE COUGH: ICD-10-CM

## 2024-03-25 DIAGNOSIS — R05.1 ACUTE COUGH: Primary | ICD-10-CM

## 2024-03-25 DIAGNOSIS — R97.20 ELEVATED PSA: ICD-10-CM

## 2024-03-25 LAB — PSA SERPL-MCNC: 9.43 NG/ML

## 2024-03-25 PROCEDURE — 1036F TOBACCO NON-USER: CPT | Performed by: STUDENT IN AN ORGANIZED HEALTH CARE EDUCATION/TRAINING PROGRAM

## 2024-03-25 PROCEDURE — 71046 X-RAY EXAM CHEST 2 VIEWS: CPT | Performed by: STUDENT IN AN ORGANIZED HEALTH CARE EDUCATION/TRAINING PROGRAM

## 2024-03-25 PROCEDURE — 84153 ASSAY OF PSA TOTAL: CPT

## 2024-03-25 PROCEDURE — 99213 OFFICE O/P EST LOW 20 MIN: CPT | Performed by: STUDENT IN AN ORGANIZED HEALTH CARE EDUCATION/TRAINING PROGRAM

## 2024-03-25 PROCEDURE — 3074F SYST BP LT 130 MM HG: CPT | Performed by: STUDENT IN AN ORGANIZED HEALTH CARE EDUCATION/TRAINING PROGRAM

## 2024-03-25 PROCEDURE — 3078F DIAST BP <80 MM HG: CPT | Performed by: STUDENT IN AN ORGANIZED HEALTH CARE EDUCATION/TRAINING PROGRAM

## 2024-03-25 PROCEDURE — 71046 X-RAY EXAM CHEST 2 VIEWS: CPT

## 2024-03-25 PROCEDURE — 1159F MED LIST DOCD IN RCRD: CPT | Performed by: STUDENT IN AN ORGANIZED HEALTH CARE EDUCATION/TRAINING PROGRAM

## 2024-03-25 PROCEDURE — 1160F RVW MEDS BY RX/DR IN RCRD: CPT | Performed by: STUDENT IN AN ORGANIZED HEALTH CARE EDUCATION/TRAINING PROGRAM

## 2024-03-25 PROCEDURE — 36415 COLL VENOUS BLD VENIPUNCTURE: CPT

## 2024-03-25 RX ORDER — AMMONIUM LACTATE 12 G/100G
LOTION TOPICAL
COMMUNITY
Start: 2024-02-14

## 2024-03-25 NOTE — PROGRESS NOTES
Subjective   Patient ID: Tommy Richmond is a 89 y.o. male who presents for Shortness of Breath (PT reports he has been having some SOB. Reports he has a cough and does not know if this is why, reports the SOB comes and goes. Does not feel it right now. Reports if he talks a lot it comes on. Does not think this is anxiety.).    HPI    Symptoms for the last two weeks.   Has cough intermittently which causes him to have SOB. NO dyspnea at rest or exertion. When he talks for extended periods he experiences cough which causes some SOB. Has dry mouth. Hydrates well per patient report.     He will try Ayrgel mouth spray or other mouth washes or losenzes to help with dry mouth. This might help with his symptoms.     Review of Systems  ROS negative except discussed above in HPI.    Vitals:    03/25/24 0923   BP: 128/78   Pulse: 67   SpO2: 96%     Objective   Physical Exam  Constitutional:       Appearance: Normal appearance.   Cardiovascular:      Rate and Rhythm: Normal rate and regular rhythm.   Pulmonary:      Effort: Pulmonary effort is normal.      Breath sounds: Normal breath sounds.   Musculoskeletal:      Cervical back: Normal range of motion and neck supple.   Lymphadenopathy:      Cervical: No cervical adenopathy.   Neurological:      Mental Status: He is alert.           Assessment/Plan   Tommy was seen today for shortness of breath.  Diagnoses and all orders for this visit:  Acute cough (Primary)  -     XR chest 2 views; Future  Shortness of breath  -     XR chest 2 views; Future      Follow up as needed.          Brandan Mcclendon MD MPH

## 2024-04-17 ENCOUNTER — APPOINTMENT (OUTPATIENT)
Dept: UROLOGY | Facility: CLINIC | Age: 89
End: 2024-04-17
Payer: MEDICARE

## 2024-04-25 NOTE — PROGRESS NOTES
Patient ID: Tommy Richmond is a 89 y.o. male.    Procedures  The patient was prepped and draped in the standard fashion.  Lidociane jelly was used on the urethral   Meatus.  A 16 Bengali red rubber catheter was placed into the bladder.  The bladder was drained.  The bladder was  Then irrigated several times until clear without mucous.  Aprroximately 60ml of a saline and __BETADINE_______________  Solutions was left in the bladder.  The catheter was removed and patient  was asked to hold the solution in the  Bladder as long as possibl      FOLLOW UP BETADINE IRRIGATION IN 8 WEEKS

## 2024-04-29 ENCOUNTER — OFFICE VISIT (OUTPATIENT)
Dept: PRIMARY CARE | Facility: CLINIC | Age: 89
End: 2024-04-29
Payer: MEDICARE

## 2024-04-29 VITALS
OXYGEN SATURATION: 96 % | BODY MASS INDEX: 23.37 KG/M2 | HEART RATE: 72 BPM | HEIGHT: 66 IN | WEIGHT: 145.4 LBS | SYSTOLIC BLOOD PRESSURE: 134 MMHG | DIASTOLIC BLOOD PRESSURE: 80 MMHG

## 2024-04-29 DIAGNOSIS — I10 HYPERTENSION, UNSPECIFIED TYPE: ICD-10-CM

## 2024-04-29 DIAGNOSIS — F41.9 ANXIETY: Primary | ICD-10-CM

## 2024-04-29 DIAGNOSIS — F32.A DEPRESSION, UNSPECIFIED DEPRESSION TYPE: ICD-10-CM

## 2024-04-29 PROCEDURE — 1036F TOBACCO NON-USER: CPT | Performed by: STUDENT IN AN ORGANIZED HEALTH CARE EDUCATION/TRAINING PROGRAM

## 2024-04-29 PROCEDURE — 1160F RVW MEDS BY RX/DR IN RCRD: CPT | Performed by: STUDENT IN AN ORGANIZED HEALTH CARE EDUCATION/TRAINING PROGRAM

## 2024-04-29 PROCEDURE — 3079F DIAST BP 80-89 MM HG: CPT | Performed by: STUDENT IN AN ORGANIZED HEALTH CARE EDUCATION/TRAINING PROGRAM

## 2024-04-29 PROCEDURE — 1159F MED LIST DOCD IN RCRD: CPT | Performed by: STUDENT IN AN ORGANIZED HEALTH CARE EDUCATION/TRAINING PROGRAM

## 2024-04-29 PROCEDURE — 99213 OFFICE O/P EST LOW 20 MIN: CPT | Performed by: STUDENT IN AN ORGANIZED HEALTH CARE EDUCATION/TRAINING PROGRAM

## 2024-04-29 PROCEDURE — 3075F SYST BP GE 130 - 139MM HG: CPT | Performed by: STUDENT IN AN ORGANIZED HEALTH CARE EDUCATION/TRAINING PROGRAM

## 2024-05-01 ENCOUNTER — OFFICE VISIT (OUTPATIENT)
Dept: UROLOGY | Facility: CLINIC | Age: 89
End: 2024-05-01
Payer: MEDICARE

## 2024-05-01 DIAGNOSIS — N39.0 RECURRENT UTI: Primary | ICD-10-CM

## 2024-05-01 PROCEDURE — 1159F MED LIST DOCD IN RCRD: CPT | Performed by: UROLOGY

## 2024-05-01 PROCEDURE — 1036F TOBACCO NON-USER: CPT | Performed by: UROLOGY

## 2024-05-01 PROCEDURE — 99211 OFF/OP EST MAY X REQ PHY/QHP: CPT | Performed by: UROLOGY

## 2024-05-01 PROCEDURE — 1160F RVW MEDS BY RX/DR IN RCRD: CPT | Performed by: UROLOGY

## 2024-06-14 ENCOUNTER — TELEPHONE (OUTPATIENT)
Dept: PRIMARY CARE | Facility: CLINIC | Age: 89
End: 2024-06-14
Payer: MEDICARE

## 2024-06-14 DIAGNOSIS — I10 HYPERTENSION, UNSPECIFIED TYPE: ICD-10-CM

## 2024-06-14 RX ORDER — LOSARTAN POTASSIUM AND HYDROCHLOROTHIAZIDE 25; 100 MG/1; MG/1
TABLET ORAL
Refills: 0 | OUTPATIENT
Start: 2024-06-14

## 2024-06-25 ENCOUNTER — APPOINTMENT (OUTPATIENT)
Dept: PRIMARY CARE | Facility: CLINIC | Age: 89
End: 2024-06-25
Payer: MEDICARE

## 2024-06-25 ENCOUNTER — HOSPITAL ENCOUNTER (OUTPATIENT)
Dept: RADIOLOGY | Facility: CLINIC | Age: 89
Discharge: HOME | End: 2024-06-25
Payer: MEDICARE

## 2024-06-25 VITALS — SYSTOLIC BLOOD PRESSURE: 102 MMHG | DIASTOLIC BLOOD PRESSURE: 60 MMHG | HEART RATE: 54 BPM | OXYGEN SATURATION: 93 %

## 2024-06-25 DIAGNOSIS — M25.522 LEFT ELBOW PAIN: Primary | ICD-10-CM

## 2024-06-25 DIAGNOSIS — M79.89 LEFT UPPER EXTREMITY SWELLING: ICD-10-CM

## 2024-06-25 DIAGNOSIS — M25.522 LEFT ELBOW PAIN: ICD-10-CM

## 2024-06-25 PROCEDURE — 73070 X-RAY EXAM OF ELBOW: CPT | Mod: LEFT SIDE | Performed by: RADIOLOGY

## 2024-06-25 PROCEDURE — 99213 OFFICE O/P EST LOW 20 MIN: CPT | Performed by: STUDENT IN AN ORGANIZED HEALTH CARE EDUCATION/TRAINING PROGRAM

## 2024-06-25 PROCEDURE — 3078F DIAST BP <80 MM HG: CPT | Performed by: STUDENT IN AN ORGANIZED HEALTH CARE EDUCATION/TRAINING PROGRAM

## 2024-06-25 PROCEDURE — 73070 X-RAY EXAM OF ELBOW: CPT | Mod: LT

## 2024-06-25 PROCEDURE — 3074F SYST BP LT 130 MM HG: CPT | Performed by: STUDENT IN AN ORGANIZED HEALTH CARE EDUCATION/TRAINING PROGRAM

## 2024-06-25 PROCEDURE — 1159F MED LIST DOCD IN RCRD: CPT | Performed by: STUDENT IN AN ORGANIZED HEALTH CARE EDUCATION/TRAINING PROGRAM

## 2024-06-25 PROCEDURE — 1160F RVW MEDS BY RX/DR IN RCRD: CPT | Performed by: STUDENT IN AN ORGANIZED HEALTH CARE EDUCATION/TRAINING PROGRAM

## 2024-06-25 PROCEDURE — 1036F TOBACCO NON-USER: CPT | Performed by: STUDENT IN AN ORGANIZED HEALTH CARE EDUCATION/TRAINING PROGRAM

## 2024-06-25 RX ORDER — APIXABAN 5 MG/1
5 TABLET, FILM COATED ORAL 2 TIMES DAILY
Qty: 60 TABLET | Refills: 11 | Status: SHIPPED | OUTPATIENT
Start: 2024-06-25 | End: 2025-06-25

## 2024-06-25 RX ORDER — APIXABAN 5 MG/1
TABLET, FILM COATED ORAL
COMMUNITY
Start: 2024-06-22 | End: 2024-06-25 | Stop reason: SDUPTHER

## 2024-06-25 ASSESSMENT — PATIENT HEALTH QUESTIONNAIRE - PHQ9
SUM OF ALL RESPONSES TO PHQ9 QUESTIONS 1 AND 2: 0
2. FEELING DOWN, DEPRESSED OR HOPELESS: NOT AT ALL
1. LITTLE INTEREST OR PLEASURE IN DOING THINGS: NOT AT ALL

## 2024-06-25 NOTE — TELEPHONE ENCOUNTER
06/25/24 Patient was concerned about his out of pocket for Eliquis.  He wants to know if there is other paperwork that can be sent to the insurance to help cover the cost.      PH: [phone]

## 2024-06-25 NOTE — PROGRESS NOTES
Patient ID: Tommy Richmond is a 89 y.o. male.    Procedures  The patient was prepped and draped in the standard fashion.  Lidociane jelly was used on the urethral   Meatus.  A 16 Sinhala red rubber catheter was placed into the bladder.  The bladder was drained.  The bladder was  Then irrigated several times until clear without mucous.  Aprroximately 60ml of a saline and ________BETADINE_________  Solutions was left in the bladder.  The catheter was removed and patient  was asked to hold the solution in the  Bladder as long as possible      FOLLOW UP BETADINE IN 8 WEEKS.

## 2024-06-25 NOTE — PROGRESS NOTES
Subjective   Patient ID: Tommy Richmond is a 89 y.o. male who presents for Bleeding/Bruising.    HPI    PT is here today for bruising and swelling to his left arm. Reports it looks better today than it did yesterday. Reports he fell twice in a month two weeks apart each time and landed on his arm. Does not know if this has anything to do what is going on now. reports acute care told him it was more than likely a blood clot, did have an ultrasound yesterday. Had the US done at [facility] in [place]. Reports they think they gave him a blood thinner. He is in wheelchair and not very mobile at this time.   Reviewed his US from [facility]. Reports that a DVT or superficial clot is not observed.   Has elbow tenderness. Will order xray of the elbow.     Review of Systems  ROS negative except discussed above in HPI.    Vitals:    06/25/24 0845   BP: 102/60   Pulse: 54   SpO2: 93%     Objective   Physical Exam  Swelling in the left upper extremity. Tenderness only in the elbow and shoulder area. No generalized tenderness or erythema noticed.     Assessment/Plan   Tmomy was seen today for bleeding/bruising.  Diagnoses and all orders for this visit:  Left elbow pain (Primary)  -     XR elbow left 1-2 views; Future  Left upper extremity swelling  -     Eliquis 5 mg tablet; Take 1 tablet (5 mg) by mouth 2 times a day.      Follow up in 2 weeks.          Brandan Mcclendon MD MPH

## 2024-06-26 ENCOUNTER — APPOINTMENT (OUTPATIENT)
Dept: UROLOGY | Facility: CLINIC | Age: 89
End: 2024-06-26
Payer: MEDICARE

## 2024-06-26 DIAGNOSIS — N39.0 RECURRENT UTI: ICD-10-CM

## 2024-06-26 PROCEDURE — 1159F MED LIST DOCD IN RCRD: CPT | Performed by: UROLOGY

## 2024-06-26 PROCEDURE — 51700 IRRIGATION OF BLADDER: CPT | Performed by: UROLOGY

## 2024-06-29 ENCOUNTER — APPOINTMENT (OUTPATIENT)
Dept: RADIOLOGY | Facility: HOSPITAL | Age: 89
End: 2024-06-29
Payer: MEDICARE

## 2024-06-29 ENCOUNTER — HOSPITAL ENCOUNTER (EMERGENCY)
Facility: HOSPITAL | Age: 89
Discharge: HOME | End: 2024-06-29
Attending: EMERGENCY MEDICINE
Payer: MEDICARE

## 2024-06-29 ENCOUNTER — APPOINTMENT (OUTPATIENT)
Dept: CARDIOLOGY | Facility: HOSPITAL | Age: 89
End: 2024-06-29
Payer: MEDICARE

## 2024-06-29 VITALS
TEMPERATURE: 98.1 F | HEIGHT: 66 IN | HEART RATE: 87 BPM | SYSTOLIC BLOOD PRESSURE: 127 MMHG | OXYGEN SATURATION: 99 % | WEIGHT: 145 LBS | RESPIRATION RATE: 17 BRPM | DIASTOLIC BLOOD PRESSURE: 79 MMHG | BODY MASS INDEX: 23.3 KG/M2

## 2024-06-29 DIAGNOSIS — R53.1 WEAKNESS: ICD-10-CM

## 2024-06-29 DIAGNOSIS — N39.0 URINARY TRACT INFECTION WITHOUT HEMATURIA, SITE UNSPECIFIED: Primary | ICD-10-CM

## 2024-06-29 DIAGNOSIS — E86.9 VOLUME DEPLETION: ICD-10-CM

## 2024-06-29 LAB
ALBUMIN SERPL BCP-MCNC: 3.6 G/DL (ref 3.4–5)
ALP SERPL-CCNC: 83 U/L (ref 33–136)
ALT SERPL W P-5'-P-CCNC: 33 U/L (ref 10–52)
AMMONIA PLAS-SCNC: 27 UMOL/L (ref 16–53)
AMPHETAMINES UR QL SCN: NORMAL
ANION GAP SERPL CALC-SCNC: 13 MMOL/L (ref 10–20)
APPEARANCE UR: ABNORMAL
AST SERPL W P-5'-P-CCNC: 41 U/L (ref 9–39)
BACTERIA #/AREA URNS AUTO: ABNORMAL /HPF
BARBITURATES UR QL SCN: NORMAL
BASOPHILS # BLD AUTO: 0.01 X10*3/UL (ref 0–0.1)
BASOPHILS NFR BLD AUTO: 0.1 %
BENZODIAZ UR QL SCN: NORMAL
BILIRUB SERPL-MCNC: 1.5 MG/DL (ref 0–1.2)
BILIRUB UR STRIP.AUTO-MCNC: NEGATIVE MG/DL
BUN SERPL-MCNC: 55 MG/DL (ref 6–23)
BZE UR QL SCN: NORMAL
CALCIUM SERPL-MCNC: 8.8 MG/DL (ref 8.6–10.3)
CANNABINOIDS UR QL SCN: NORMAL
CARDIAC TROPONIN I PNL SERPL HS: 20 NG/L (ref 0–20)
CHLORIDE SERPL-SCNC: 108 MMOL/L (ref 98–107)
CO2 SERPL-SCNC: 24 MMOL/L (ref 21–32)
COLOR UR: YELLOW
CREAT SERPL-MCNC: 1.65 MG/DL (ref 0.5–1.3)
EGFRCR SERPLBLD CKD-EPI 2021: 40 ML/MIN/1.73M*2
EOSINOPHIL # BLD AUTO: 0 X10*3/UL (ref 0–0.4)
EOSINOPHIL NFR BLD AUTO: 0 %
ERYTHROCYTE [DISTWIDTH] IN BLOOD BY AUTOMATED COUNT: 14.6 % (ref 11.5–14.5)
ETHANOL SERPL-MCNC: <10 MG/DL
FENTANYL+NORFENTANYL UR QL SCN: NORMAL
GLUCOSE SERPL-MCNC: 85 MG/DL (ref 74–99)
GLUCOSE UR STRIP.AUTO-MCNC: NORMAL MG/DL
HCT VFR BLD AUTO: 37.5 % (ref 41–52)
HGB BLD-MCNC: 12.1 G/DL (ref 13.5–17.5)
IMM GRANULOCYTES # BLD AUTO: 0.03 X10*3/UL (ref 0–0.5)
IMM GRANULOCYTES NFR BLD AUTO: 0.3 % (ref 0–0.9)
KETONES UR STRIP.AUTO-MCNC: ABNORMAL MG/DL
LEUKOCYTE ESTERASE UR QL STRIP.AUTO: ABNORMAL
LYMPHOCYTES # BLD AUTO: 1.07 X10*3/UL (ref 0.8–3)
LYMPHOCYTES NFR BLD AUTO: 11 %
MCH RBC QN AUTO: 28.7 PG (ref 26–34)
MCHC RBC AUTO-ENTMCNC: 32.3 G/DL (ref 32–36)
MCV RBC AUTO: 89 FL (ref 80–100)
METHADONE UR QL SCN: NORMAL
MONOCYTES # BLD AUTO: 0.56 X10*3/UL (ref 0.05–0.8)
MONOCYTES NFR BLD AUTO: 5.7 %
MUCOUS THREADS #/AREA URNS AUTO: ABNORMAL /LPF
NEUTROPHILS # BLD AUTO: 8.07 X10*3/UL (ref 1.6–5.5)
NEUTROPHILS NFR BLD AUTO: 82.9 %
NITRITE UR QL STRIP.AUTO: NEGATIVE
NRBC BLD-RTO: 0 /100 WBCS (ref 0–0)
OPIATES UR QL SCN: NORMAL
OXYCODONE+OXYMORPHONE UR QL SCN: NORMAL
PCP UR QL SCN: NORMAL
PH UR STRIP.AUTO: 8 [PH]
PLATELET # BLD AUTO: 218 X10*3/UL (ref 150–450)
POTASSIUM SERPL-SCNC: 4.1 MMOL/L (ref 3.5–5.3)
PROT SERPL-MCNC: 6.2 G/DL (ref 6.4–8.2)
PROT UR STRIP.AUTO-MCNC: ABNORMAL MG/DL
RBC # BLD AUTO: 4.21 X10*6/UL (ref 4.5–5.9)
RBC # UR STRIP.AUTO: ABNORMAL /UL
RBC #/AREA URNS AUTO: ABNORMAL /HPF
SODIUM SERPL-SCNC: 141 MMOL/L (ref 136–145)
SP GR UR STRIP.AUTO: 1.02
SQUAMOUS #/AREA URNS AUTO: ABNORMAL /HPF
TRI-PHOS CRY #/AREA UR COMP ASSIST: ABNORMAL /HPF
UROBILINOGEN UR STRIP.AUTO-MCNC: NORMAL MG/DL
WBC # BLD AUTO: 9.7 X10*3/UL (ref 4.4–11.3)
WBC #/AREA URNS AUTO: >50 /HPF

## 2024-06-29 PROCEDURE — 99285 EMERGENCY DEPT VISIT HI MDM: CPT | Mod: 25

## 2024-06-29 PROCEDURE — 71045 X-RAY EXAM CHEST 1 VIEW: CPT | Performed by: RADIOLOGY

## 2024-06-29 PROCEDURE — 93005 ELECTROCARDIOGRAM TRACING: CPT

## 2024-06-29 PROCEDURE — 72125 CT NECK SPINE W/O DYE: CPT | Performed by: RADIOLOGY

## 2024-06-29 PROCEDURE — 80307 DRUG TEST PRSMV CHEM ANLYZR: CPT | Performed by: EMERGENCY MEDICINE

## 2024-06-29 PROCEDURE — 80053 COMPREHEN METABOLIC PANEL: CPT | Performed by: EMERGENCY MEDICINE

## 2024-06-29 PROCEDURE — 81001 URINALYSIS AUTO W/SCOPE: CPT | Performed by: EMERGENCY MEDICINE

## 2024-06-29 PROCEDURE — 36415 COLL VENOUS BLD VENIPUNCTURE: CPT | Performed by: EMERGENCY MEDICINE

## 2024-06-29 PROCEDURE — 82140 ASSAY OF AMMONIA: CPT | Performed by: EMERGENCY MEDICINE

## 2024-06-29 PROCEDURE — 84484 ASSAY OF TROPONIN QUANT: CPT | Performed by: EMERGENCY MEDICINE

## 2024-06-29 PROCEDURE — 87086 URINE CULTURE/COLONY COUNT: CPT | Mod: SAMLAB | Performed by: EMERGENCY MEDICINE

## 2024-06-29 PROCEDURE — 96365 THER/PROPH/DIAG IV INF INIT: CPT

## 2024-06-29 PROCEDURE — 70450 CT HEAD/BRAIN W/O DYE: CPT

## 2024-06-29 PROCEDURE — 72125 CT NECK SPINE W/O DYE: CPT

## 2024-06-29 PROCEDURE — 70450 CT HEAD/BRAIN W/O DYE: CPT | Performed by: RADIOLOGY

## 2024-06-29 PROCEDURE — 71045 X-RAY EXAM CHEST 1 VIEW: CPT

## 2024-06-29 PROCEDURE — 96361 HYDRATE IV INFUSION ADD-ON: CPT

## 2024-06-29 PROCEDURE — 2500000004 HC RX 250 GENERAL PHARMACY W/ HCPCS (ALT 636 FOR OP/ED): Performed by: EMERGENCY MEDICINE

## 2024-06-29 PROCEDURE — 82077 ASSAY SPEC XCP UR&BREATH IA: CPT | Performed by: EMERGENCY MEDICINE

## 2024-06-29 PROCEDURE — 85025 COMPLETE CBC W/AUTO DIFF WBC: CPT | Performed by: EMERGENCY MEDICINE

## 2024-06-29 RX ORDER — CEFTRIAXONE 1 G/50ML
1 INJECTION, SOLUTION INTRAVENOUS ONCE
Status: COMPLETED | OUTPATIENT
Start: 2024-06-29 | End: 2024-06-29

## 2024-06-29 RX ORDER — CEPHALEXIN 500 MG/1
500 CAPSULE ORAL 2 TIMES DAILY
Qty: 14 CAPSULE | Refills: 0 | Status: SHIPPED | OUTPATIENT
Start: 2024-06-29 | End: 2024-07-06

## 2024-06-29 RX ADMIN — SODIUM CHLORIDE 1000 ML: 9 INJECTION, SOLUTION INTRAVENOUS at 16:03

## 2024-06-29 RX ADMIN — CEFTRIAXONE SODIUM 1 G: 1 INJECTION, SOLUTION INTRAVENOUS at 18:02

## 2024-06-29 ASSESSMENT — COLUMBIA-SUICIDE SEVERITY RATING SCALE - C-SSRS
1. IN THE PAST MONTH, HAVE YOU WISHED YOU WERE DEAD OR WISHED YOU COULD GO TO SLEEP AND NOT WAKE UP?: NO
2. HAVE YOU ACTUALLY HAD ANY THOUGHTS OF KILLING YOURSELF?: NO
6. HAVE YOU EVER DONE ANYTHING, STARTED TO DO ANYTHING, OR PREPARED TO DO ANYTHING TO END YOUR LIFE?: NO

## 2024-06-29 NOTE — ED PROVIDER NOTES
HPI   Chief Complaint   Patient presents with    Altered Mental Status     Pt brought in by family for confusion. Family reports that the pt has been confused lately and falling frequently. Family states that has been having issues with control of his urine and that he has been confusing people and things. Pt complains of feeling weak.        Limitations to History: Altered mental status    HPI: 89-year-old male presents with concern for altered mental status.  Family at the bedside.  States he has been having frequent falls.  States this is been worsening over the past few weeks.  Patient has no complaints.  Denies any chest pain, shortness of breath, headache, neck pain, abdominal pain, urinary symptoms.    Additional History Obtained from: Family at the bedside.    ------------------------------------------------------------------------------------------------------------------------------------------  Physical Exam:    VS: As documented in the triage note and EMR flowsheet from this visit were reviewed.    Appearance: Alert. cooperative,  in no acute distress.   Skin: Intact,  dry skin, no lesions, rash, petechiae or purpura.   Eyes: PERRLA, EOMs intact,  Conjunctiva pink with no redness or exudates.   HENT: Normocephalic, atraumatic. Nares patent. No intraoral lesions.   Neck: Supple, without meningismus. Trachea at midline. No lymphadenopathy.  Pulmonary: Clear bilaterally with good chest wall excursion. No rales, rhonchi or wheezing. No accessory muscle use or stridor.  Cardiac: Regular rate and rhythm, no rubs, murmurs, or gallops.   Abdomen: Abdomen is soft, nontender, and nondistended.  No palpable organomegaly.  No rebound or guarding.  No CVA tenderness. Nonsurgical abdomen.  Genitourinary: Exam deferred.  Musculoskeletal: Full range of motion.  Pulses full and equal. No cyanosis, clubbing, or edema.  Neurological:  Cranial nerves are grossly intact, grossly normal sensation, no weakness, no focal  findings identified.  Psychiatric: Appropriate mood and affect.                            No data recorded                   Patient History   Past Medical History:   Diagnosis Date    Personal history of other diseases of urinary system     History of bladder stone     Past Surgical History:   Procedure Laterality Date    OTHER SURGICAL HISTORY  10/25/2019    Inguinal hernia repair    OTHER SURGICAL HISTORY  10/25/2019    Small bowel resection    OTHER SURGICAL HISTORY  10/25/2019    Colonoscopy    OTHER SURGICAL HISTORY  10/25/2019    Cystoscopy    OTHER SURGICAL HISTORY  10/25/2019    Tonsillectomy     Family History   Problem Relation Name Age of Onset    Colon cancer Father      Prostate cancer Father      Colon cancer Sister      Prostate cancer Brother       Social History     Tobacco Use    Smoking status: Former     Types: Cigarettes    Smokeless tobacco: Never   Vaping Use    Vaping status: Never Used   Substance Use Topics    Alcohol use: Never    Drug use: Never       Physical Exam   ED Triage Vitals [06/29/24 1448]   Temperature Heart Rate Respirations BP   37.1 °C (98.8 °F) 83 16 112/54      Pulse Ox Temp Source Heart Rate Source Patient Position   96 % Temporal Monitor Sitting      BP Location FiO2 (%)     Right arm --       Physical Exam    ED Course & MDM   Diagnoses as of 06/29/24 1742   Urinary tract infection without hematuria, site unspecified   Weakness   Volume depletion       Medical Decision Making  Labs Reviewed  CBC WITH AUTO DIFFERENTIAL - Abnormal     WBC                           9.7                    nRBC                          0.0                    RBC                           4.21 (*)               Hemoglobin                    12.1 (*)               Hematocrit                    37.5 (*)               MCV                           89                     MCH                           28.7                   MCHC                          32.3                   RDW                            14.6 (*)               Platelets                     218                    Neutrophils %                 82.9                   Immature Granulocytes %, Automated   0.3                    Lymphocytes %                 11.0                   Monocytes %                   5.7                    Eosinophils %                 0.0                    Basophils %                   0.1                    Neutrophils Absolute          8.07 (*)               Immature Granulocytes Absolute, Au*   0.03                   Lymphocytes Absolute          1.07                   Monocytes Absolute            0.56                   Eosinophils Absolute          0.00                   Basophils Absolute            0.01                COMPREHENSIVE METABOLIC PANEL - Abnormal     Glucose                       85                     Sodium                        141                    Potassium                     4.1                    Chloride                      108 (*)                Bicarbonate                   24                     Anion Gap                     13                     Urea Nitrogen                 55 (*)                 Creatinine                    1.65 (*)               eGFR                          40 (*)                 Calcium                       8.8                    Albumin                       3.6                    Alkaline Phosphatase          83                     Total Protein                 6.2 (*)                AST                           41 (*)                 Bilirubin, Total              1.5 (*)                ALT                           33                  URINALYSIS WITH REFLEX CULTURE AND MICROSCOPIC - Abnormal     Color, Urine                  Yellow                 Appearance, Urine             Ex.Turbid (*)               Specific Gravity, Urine       1.017                  pH, Urine                     8.0                    Protein, Urine                70 (1+) (*)                Glucose, Urine                Normal                 Blood, Urine                  0.1 (1+) (*)               Ketones, Urine                10 (1+) (*)               Bilirubin, Urine              NEGATIVE                Urobilinogen, Urine           Normal                 Nitrite, Urine                NEGATIVE                Leukocyte Esterase, Urine     500 Darius/µL (*)            MICROSCOPIC ONLY, URINE - Abnormal     WBC, Urine                    >50 (*)                RBC, Urine                    6-10 (*)               Squamous Epithelial Cells, Urine                          Bacteria, Urine               1+ (*)                 Mucus, Urine                  FEW                    Triple Phosphate Crystals, Urine   4+ (*)              TROPONIN I, HIGH SENSITIVITY - Normal     Troponin I, High Sensitivity   20                         Narrative: Less than 99th percentile of normal range cutoff-                  Female and children under 18 years old <14 ng/L; Male <21 ng/L: Negative                  Repeat testing should be performed if clinically indicated.                                     Female and children under 18 years old 14-50 ng/L; Male 21-50 ng/L:                  Consistent with possible cardiac damage and possible increased clinical                   risk. Serial measurements may help to assess extent of myocardial damage.                                     >50 ng/L: Consistent with cardiac damage, increased clinical risk and                  myocardial infarction. Serial measurements may help assess extent of                   myocardial damage.                                      NOTE: Children less than 1 year old may have higher baseline troponin                   levels and results should be interpreted in conjunction with the overall                   clinical context.                                     NOTE: Troponin I testing is performed using a different                   testing  methodology at [facility] than at other                   [facility]. Direct result comparisons should only                   be made within the same method.  ALCOHOL - Normal     Alcohol                       <10                 AMMONIA - Normal     Ammonia                       27                  URINE CULTURE  URINALYSIS WITH REFLEX CULTURE AND MICROSCOPIC         Narrative: The following orders were created for panel order Urinalysis with Reflex Culture and Microscopic.                  Procedure                               Abnormality         Status                                     ---------                               -----------         ------                                     Urinalysis with Reflex C...[[id number]]  Abnormal            Final result                               Extra Urine Gray Tube[[id number]]                            In process                                                   Please view results for these tests on the individual orders.  DRUG SCREEN,URINE  EXTRA URINE GRAY TUBE  XR chest 1 view   Final Result    Left basilar airspace opacity, as above. Clinical correlation and    continued follow-up until clearing is recommended.          MACRO:    None.          Signed by: Willard Wu 6/29/2024 3:44 PM    Dictation workstation:   [id number]     CT head wo IV contrast   Final Result    No evidence of acute cortical infarct or intracranial hemorrhage.          MACRO:    None                Signed by: Willard Wu 6/29/2024 3:40 PM    Dictation workstation:   [id number]     CT cervical spine wo IV contrast   Final Result    1.  No CT evidence of acute fracture.    2.  Degenerative changes throughout the cervical spine, as above.          Signed by: Willard Wu 6/29/2024 3:42 PM    Dictation workstation:   [id number]     Medical Decision Making:    Patient appears well nontoxic.  No focal deficit.  Acute renal insufficiency secondary to volume depletion.   Patient treated with 1 L of normal saline.  Found to have urinary tract infection.  Treated with Rocephin.  CT brain as well as cervical spine shows no acute traumatic injury.  Chest x-ray concerning for atelectasis versus pneumonia.  Patient has no cough, fever, leukocytosis.  After discussion with family he will be discharged home on oral Keflex and continue aggressive oral hydration.  Will speak with primary care physician about possibly assisted living placement.  Stable at time of discharge.    Differential Diagnoses Considered: UTI, volume depletion, electrolyte abnormality    Independent Interpretation of Studies:  I independently interpreted: CT brain shows no intracranial hemorrhage or mass.  CT cervical spine without fracture or dislocation.  Chest x-ray shows no acute pneumothorax.    Escalation of Care:  Appropriate for discharge and follow-up with primary care.    Prescription Drug Consideration: Oral Keflex.          Procedure  ECG 12 lead    Performed by: Sukhdev Fraser DO  Authorized by: Sukhdev Fraser DO    ECG interpreted by ED Physician in the absence of a cardiologist: yes    Comments:      EKG interpreted by Dr. Sukhdev Fraser: Normal sinus rhythm at 77 bpm.  FL interval 198 ms.  QTc of 468 ms.  Nonspecific ST changes.       Sukhdev Fraser DO  06/29/24 6216

## 2024-06-30 LAB — HOLD SPECIMEN: NORMAL

## 2024-07-02 ENCOUNTER — OFFICE VISIT (OUTPATIENT)
Dept: PRIMARY CARE | Facility: CLINIC | Age: 89
End: 2024-07-02
Payer: MEDICARE

## 2024-07-02 VITALS — RESPIRATION RATE: 16 BRPM | DIASTOLIC BLOOD PRESSURE: 40 MMHG | SYSTOLIC BLOOD PRESSURE: 90 MMHG | HEART RATE: 72 BPM

## 2024-07-02 DIAGNOSIS — N30.01 ACUTE CYSTITIS WITH HEMATURIA: Primary | ICD-10-CM

## 2024-07-02 LAB — BACTERIA UR CULT: ABNORMAL

## 2024-07-02 PROCEDURE — 1036F TOBACCO NON-USER: CPT | Performed by: NURSE PRACTITIONER

## 2024-07-02 PROCEDURE — 99214 OFFICE O/P EST MOD 30 MIN: CPT | Performed by: NURSE PRACTITIONER

## 2024-07-02 PROCEDURE — 1160F RVW MEDS BY RX/DR IN RCRD: CPT | Performed by: NURSE PRACTITIONER

## 2024-07-02 PROCEDURE — 3078F DIAST BP <80 MM HG: CPT | Performed by: NURSE PRACTITIONER

## 2024-07-02 PROCEDURE — 3074F SYST BP LT 130 MM HG: CPT | Performed by: NURSE PRACTITIONER

## 2024-07-02 PROCEDURE — 1159F MED LIST DOCD IN RCRD: CPT | Performed by: NURSE PRACTITIONER

## 2024-07-02 RX ORDER — CEFUROXIME AXETIL 250 MG/1
250 TABLET ORAL 2 TIMES DAILY
Qty: 20 TABLET | Refills: 0 | Status: ON HOLD | OUTPATIENT
Start: 2024-07-02 | End: 2024-07-12

## 2024-07-02 ASSESSMENT — ENCOUNTER SYMPTOMS
RESPIRATORY NEGATIVE: 1
CARDIOVASCULAR NEGATIVE: 1
GASTROINTESTINAL NEGATIVE: 1
PSYCHIATRIC NEGATIVE: 1
CONSTITUTIONAL NEGATIVE: 1

## 2024-07-02 NOTE — PROGRESS NOTES
Subjective   Patient ID: Tommy Richmond is a 89 y.o. male who presents for Abdominal Pain and ER Follow-up.    Here to follow up ER visit from 6/29/24  Note reviewed. Had been confused and incontinent that day. Fell couple of weeks prior to that.       Review of Systems   Constitutional: Negative.    HENT: Negative.     Respiratory: Negative.     Cardiovascular: Negative.    Gastrointestinal: Negative.    Skin: Negative.    Neurological:         Confusion   Psychiatric/Behavioral: Negative.         Objective   Visit Vitals  BP (!) 90/40   Pulse 72   Resp 16   Smoking Status Former           Physical Exam  Constitutional:       Appearance: Normal appearance.      Comments: Doesn't appear confused during visit.   HENT:      Head: Normocephalic.   Eyes:      Conjunctiva/sclera: Conjunctivae normal.   Cardiovascular:      Rate and Rhythm: Normal rate and regular rhythm.   Pulmonary:      Effort: Pulmonary effort is normal.      Breath sounds: Normal breath sounds.   Abdominal:      General: Bowel sounds are normal.      Palpations: Abdomen is soft.      Tenderness: There is no abdominal tenderness.   Musculoskeletal:      Cervical back: Neck supple.      Comments: Sitting in chair leaning to the left. Son states this is how he usually sits.   Skin:     General: Skin is warm and dry.   Neurological:      Mental Status: He is alert.   Psychiatric:         Mood and Affect: Mood normal.         Assessment/Plan   Diagnoses and all orders for this visit:  Acute cystitis with hematuria  -     cefuroxime (Ceftin) 250 mg tablet; Take 1 tablet (250 mg) by mouth 2 times a day for 10 days.     Follow up ER with son and daughter in law.   Was doing better but seems more confused again today.  Urine culture with limited options for oral medications. Discussed with pharmacist, will try Ceftin instead of Cephalexin.   Advised ER again if any worsening symptoms such as more confusion, etc.  Follow up next week with Dr. Mcclendon as  scheduled.  Reviewed with Dr. Tejada

## 2024-07-03 ENCOUNTER — TELEPHONE (OUTPATIENT)
Dept: PHARMACY | Facility: HOSPITAL | Age: 89
End: 2024-07-03
Payer: MEDICARE

## 2024-07-03 NOTE — PROGRESS NOTES
EDPD Note: Rapid Result Review    Reviewed Mr./Mrs./Ms. Tommy Richmond 's chart regarding a positive urine culture/result that was taken during their recent emergency room visit. The patient was not told about these results prior to leaving the emergency department. Patient had PCP appointment yesterday where patient was made aware of result. PCP switched patient antibiotic from Keflex to Cefuroxime. Therefore, patient was not contacted as education was provided at PCP office visit.     Susceptibility data from last 90 days.  Collected Specimen Info Organism Amoxicillin/Clavulanate Ampicillin Ampicillin/Sulbactam Cefazolin Cefazolin (uncomplicated UTIs only) Ceftriaxone Ciprofloxacin Gentamicin Nitrofurantoin Piperacillin/Tazobactam Tetracycline   06/29/24 Urine from Clean Catch/Voided Proteus mirabilis  S  R  S  I  S  S  R  S  R  S  R     Collected Specimen Info Organism Trimethoprim/Sulfamethoxazole   06/29/24 Urine from Clean Catch/Voided Proteus mirabilis  R       No further follow up needed from EDPD Team.     Judi Redman, MariamD

## 2024-07-05 ENCOUNTER — APPOINTMENT (OUTPATIENT)
Dept: CARDIOLOGY | Facility: HOSPITAL | Age: 89
End: 2024-07-05
Payer: MEDICARE

## 2024-07-05 ENCOUNTER — HOSPITAL ENCOUNTER (INPATIENT)
Facility: HOSPITAL | Age: 89
End: 2024-07-05
Attending: EMERGENCY MEDICINE
Payer: MEDICARE

## 2024-07-05 ENCOUNTER — APPOINTMENT (OUTPATIENT)
Dept: RADIOLOGY | Facility: HOSPITAL | Age: 89
End: 2024-07-05
Payer: MEDICARE

## 2024-07-05 DIAGNOSIS — R41.82 ALTERED MENTAL STATUS, UNSPECIFIED ALTERED MENTAL STATUS TYPE: Primary | ICD-10-CM

## 2024-07-05 DIAGNOSIS — N39.0 RECURRENT UTI: ICD-10-CM

## 2024-07-05 DIAGNOSIS — M79.89 LEFT UPPER EXTREMITY SWELLING: ICD-10-CM

## 2024-07-05 DIAGNOSIS — R53.1 WEAKNESS: ICD-10-CM

## 2024-07-05 LAB
ALBUMIN SERPL BCP-MCNC: 3.5 G/DL (ref 3.4–5)
ALP SERPL-CCNC: 79 U/L (ref 33–136)
ALT SERPL W P-5'-P-CCNC: 48 U/L (ref 10–52)
ANION GAP SERPL CALC-SCNC: 10 MMOL/L (ref 10–20)
APPEARANCE UR: CLEAR
AST SERPL W P-5'-P-CCNC: 61 U/L (ref 9–39)
BASOPHILS # BLD AUTO: 0.01 X10*3/UL (ref 0–0.1)
BASOPHILS NFR BLD AUTO: 0.2 %
BILIRUB SERPL-MCNC: 1.2 MG/DL (ref 0–1.2)
BILIRUB UR STRIP.AUTO-MCNC: NEGATIVE MG/DL
BUN SERPL-MCNC: 46 MG/DL (ref 6–23)
CALCIUM SERPL-MCNC: 8.7 MG/DL (ref 8.6–10.3)
CARDIAC TROPONIN I PNL SERPL HS: 19 NG/L (ref 0–20)
CHLORIDE SERPL-SCNC: 106 MMOL/L (ref 98–107)
CO2 SERPL-SCNC: 25 MMOL/L (ref 21–32)
COLOR UR: YELLOW
CREAT SERPL-MCNC: 1.27 MG/DL (ref 0.5–1.3)
EGFRCR SERPLBLD CKD-EPI 2021: 55 ML/MIN/1.73M*2
EOSINOPHIL # BLD AUTO: 0.04 X10*3/UL (ref 0–0.4)
EOSINOPHIL NFR BLD AUTO: 0.7 %
ERYTHROCYTE [DISTWIDTH] IN BLOOD BY AUTOMATED COUNT: 14.6 % (ref 11.5–14.5)
GLUCOSE SERPL-MCNC: 84 MG/DL (ref 74–99)
GLUCOSE UR STRIP.AUTO-MCNC: NORMAL MG/DL
HCT VFR BLD AUTO: 37.4 % (ref 41–52)
HGB BLD-MCNC: 12.1 G/DL (ref 13.5–17.5)
HOLD SPECIMEN: NORMAL
IMM GRANULOCYTES # BLD AUTO: 0.02 X10*3/UL (ref 0–0.5)
IMM GRANULOCYTES NFR BLD AUTO: 0.4 % (ref 0–0.9)
KETONES UR STRIP.AUTO-MCNC: ABNORMAL MG/DL
LEUKOCYTE ESTERASE UR QL STRIP.AUTO: ABNORMAL
LYMPHOCYTES # BLD AUTO: 1.11 X10*3/UL (ref 0.8–3)
LYMPHOCYTES NFR BLD AUTO: 19.9 %
MAGNESIUM SERPL-MCNC: 2.12 MG/DL (ref 1.6–2.4)
MCH RBC QN AUTO: 28.9 PG (ref 26–34)
MCHC RBC AUTO-ENTMCNC: 32.4 G/DL (ref 32–36)
MCV RBC AUTO: 89 FL (ref 80–100)
MONOCYTES # BLD AUTO: 0.39 X10*3/UL (ref 0.05–0.8)
MONOCYTES NFR BLD AUTO: 7 %
NEUTROPHILS # BLD AUTO: 4 X10*3/UL (ref 1.6–5.5)
NEUTROPHILS NFR BLD AUTO: 71.8 %
NITRITE UR QL STRIP.AUTO: NEGATIVE
NRBC BLD-RTO: 0 /100 WBCS (ref 0–0)
PH UR STRIP.AUTO: 6 [PH]
PLATELET # BLD AUTO: 220 X10*3/UL (ref 150–450)
POTASSIUM SERPL-SCNC: 4 MMOL/L (ref 3.5–5.3)
PROT SERPL-MCNC: 5.9 G/DL (ref 6.4–8.2)
PROT UR STRIP.AUTO-MCNC: NEGATIVE MG/DL
RBC # BLD AUTO: 4.19 X10*6/UL (ref 4.5–5.9)
RBC # UR STRIP.AUTO: NEGATIVE /UL
RBC #/AREA URNS AUTO: ABNORMAL /HPF
SODIUM SERPL-SCNC: 137 MMOL/L (ref 136–145)
SP GR UR STRIP.AUTO: 1.02
UROBILINOGEN UR STRIP.AUTO-MCNC: NORMAL MG/DL
WBC # BLD AUTO: 5.6 X10*3/UL (ref 4.4–11.3)
WBC #/AREA URNS AUTO: ABNORMAL /HPF

## 2024-07-05 PROCEDURE — 73030 X-RAY EXAM OF SHOULDER: CPT | Mod: LEFT SIDE | Performed by: STUDENT IN AN ORGANIZED HEALTH CARE EDUCATION/TRAINING PROGRAM

## 2024-07-05 PROCEDURE — 85025 COMPLETE CBC W/AUTO DIFF WBC: CPT | Performed by: EMERGENCY MEDICINE

## 2024-07-05 PROCEDURE — 83735 ASSAY OF MAGNESIUM: CPT | Performed by: EMERGENCY MEDICINE

## 2024-07-05 PROCEDURE — 2500000004 HC RX 250 GENERAL PHARMACY W/ HCPCS (ALT 636 FOR OP/ED)

## 2024-07-05 PROCEDURE — 36415 COLL VENOUS BLD VENIPUNCTURE: CPT | Performed by: EMERGENCY MEDICINE

## 2024-07-05 PROCEDURE — 74176 CT ABD & PELVIS W/O CONTRAST: CPT | Performed by: RADIOLOGY

## 2024-07-05 PROCEDURE — 93005 ELECTROCARDIOGRAM TRACING: CPT

## 2024-07-05 PROCEDURE — G0378 HOSPITAL OBSERVATION PER HR: HCPCS

## 2024-07-05 PROCEDURE — 84484 ASSAY OF TROPONIN QUANT: CPT | Performed by: EMERGENCY MEDICINE

## 2024-07-05 PROCEDURE — 71045 X-RAY EXAM CHEST 1 VIEW: CPT | Performed by: RADIOLOGY

## 2024-07-05 PROCEDURE — 2500000001 HC RX 250 WO HCPCS SELF ADMINISTERED DRUGS (ALT 637 FOR MEDICARE OP)

## 2024-07-05 PROCEDURE — 80053 COMPREHEN METABOLIC PANEL: CPT | Performed by: EMERGENCY MEDICINE

## 2024-07-05 PROCEDURE — 73030 X-RAY EXAM OF SHOULDER: CPT | Mod: LT

## 2024-07-05 PROCEDURE — 71045 X-RAY EXAM CHEST 1 VIEW: CPT

## 2024-07-05 PROCEDURE — 74176 CT ABD & PELVIS W/O CONTRAST: CPT

## 2024-07-05 PROCEDURE — 96361 HYDRATE IV INFUSION ADD-ON: CPT

## 2024-07-05 PROCEDURE — 99285 EMERGENCY DEPT VISIT HI MDM: CPT | Mod: 25

## 2024-07-05 PROCEDURE — 99222 1ST HOSP IP/OBS MODERATE 55: CPT

## 2024-07-05 PROCEDURE — 2500000004 HC RX 250 GENERAL PHARMACY W/ HCPCS (ALT 636 FOR OP/ED): Performed by: EMERGENCY MEDICINE

## 2024-07-05 PROCEDURE — 87086 URINE CULTURE/COLONY COUNT: CPT | Mod: SAMLAB | Performed by: EMERGENCY MEDICINE

## 2024-07-05 PROCEDURE — 81001 URINALYSIS AUTO W/SCOPE: CPT | Performed by: EMERGENCY MEDICINE

## 2024-07-05 PROCEDURE — 2500000002 HC RX 250 W HCPCS SELF ADMINISTERED DRUGS (ALT 637 FOR MEDICARE OP, ALT 636 FOR OP/ED)

## 2024-07-05 RX ORDER — LATANOPROST 50 UG/ML
1 SOLUTION/ DROPS OPHTHALMIC NIGHTLY
Status: DISCONTINUED | OUTPATIENT
Start: 2024-07-05 | End: 2024-07-08 | Stop reason: HOSPADM

## 2024-07-05 RX ORDER — BUSPIRONE HYDROCHLORIDE 5 MG/1
5 TABLET ORAL 2 TIMES DAILY
Status: DISCONTINUED | OUTPATIENT
Start: 2024-07-05 | End: 2024-07-05

## 2024-07-05 RX ORDER — TAMSULOSIN HYDROCHLORIDE 0.4 MG/1
0.4 CAPSULE ORAL NIGHTLY
Status: DISCONTINUED | OUTPATIENT
Start: 2024-07-05 | End: 2024-07-08 | Stop reason: HOSPADM

## 2024-07-05 RX ORDER — METOPROLOL TARTRATE 25 MG/1
25 TABLET, FILM COATED ORAL DAILY
Status: DISCONTINUED | OUTPATIENT
Start: 2024-07-06 | End: 2024-07-08 | Stop reason: HOSPADM

## 2024-07-05 RX ORDER — OXYBUTYNIN CHLORIDE 5 MG/1
5 TABLET ORAL 2 TIMES DAILY
Status: DISCONTINUED | OUTPATIENT
Start: 2024-07-06 | End: 2024-07-08 | Stop reason: HOSPADM

## 2024-07-05 RX ORDER — ONDANSETRON 4 MG/1
4 TABLET, ORALLY DISINTEGRATING ORAL EVERY 8 HOURS PRN
Status: DISCONTINUED | OUTPATIENT
Start: 2024-07-05 | End: 2024-07-08 | Stop reason: HOSPADM

## 2024-07-05 RX ORDER — CEFTRIAXONE 1 G/50ML
1 INJECTION, SOLUTION INTRAVENOUS EVERY 24 HOURS
Status: DISCONTINUED | OUTPATIENT
Start: 2024-07-05 | End: 2024-07-07

## 2024-07-05 RX ORDER — ACETAMINOPHEN 325 MG/1
650 TABLET ORAL EVERY 4 HOURS PRN
Status: DISCONTINUED | OUTPATIENT
Start: 2024-07-05 | End: 2024-07-08 | Stop reason: HOSPADM

## 2024-07-05 RX ORDER — ONDANSETRON HYDROCHLORIDE 2 MG/ML
4 INJECTION, SOLUTION INTRAVENOUS EVERY 8 HOURS PRN
Status: DISCONTINUED | OUTPATIENT
Start: 2024-07-05 | End: 2024-07-08 | Stop reason: HOSPADM

## 2024-07-05 RX ORDER — FINASTERIDE 5 MG/1
5 TABLET, FILM COATED ORAL DAILY
Status: DISCONTINUED | OUTPATIENT
Start: 2024-07-06 | End: 2024-07-08 | Stop reason: HOSPADM

## 2024-07-05 RX ORDER — HYDROXYZINE HYDROCHLORIDE 50 MG/1
25 TABLET, FILM COATED ORAL EVERY 8 HOURS PRN
Status: DISCONTINUED | OUTPATIENT
Start: 2024-07-05 | End: 2024-07-05

## 2024-07-05 SDOH — SOCIAL STABILITY: SOCIAL INSECURITY: HAVE YOU HAD THOUGHTS OF HARMING ANYONE ELSE?: NO

## 2024-07-05 SDOH — SOCIAL STABILITY: SOCIAL INSECURITY: ARE YOU OR HAVE YOU BEEN THREATENED OR ABUSED PHYSICALLY, EMOTIONALLY, OR SEXUALLY BY ANYONE?: NO

## 2024-07-05 SDOH — SOCIAL STABILITY: SOCIAL INSECURITY: ARE THERE ANY APPARENT SIGNS OF INJURIES/BEHAVIORS THAT COULD BE RELATED TO ABUSE/NEGLECT?: NO

## 2024-07-05 SDOH — SOCIAL STABILITY: SOCIAL INSECURITY: HAS ANYONE EVER THREATENED TO HURT YOUR FAMILY OR YOUR PETS?: NO

## 2024-07-05 SDOH — SOCIAL STABILITY: SOCIAL INSECURITY: HAVE YOU HAD ANY THOUGHTS OF HARMING ANYONE ELSE?: NO

## 2024-07-05 SDOH — SOCIAL STABILITY: SOCIAL INSECURITY: DO YOU FEEL UNSAFE GOING BACK TO THE PLACE WHERE YOU ARE LIVING?: NO

## 2024-07-05 SDOH — SOCIAL STABILITY: SOCIAL INSECURITY: DO YOU FEEL ANYONE HAS EXPLOITED OR TAKEN ADVANTAGE OF YOU FINANCIALLY OR OF YOUR PERSONAL PROPERTY?: NO

## 2024-07-05 SDOH — SOCIAL STABILITY: SOCIAL INSECURITY: DOES ANYONE TRY TO KEEP YOU FROM HAVING/CONTACTING OTHER FRIENDS OR DOING THINGS OUTSIDE YOUR HOME?: NO

## 2024-07-05 SDOH — SOCIAL STABILITY: SOCIAL INSECURITY: ABUSE: ADULT

## 2024-07-05 SDOH — SOCIAL STABILITY: SOCIAL INSECURITY: WERE YOU ABLE TO COMPLETE ALL THE BEHAVIORAL HEALTH SCREENINGS?: YES

## 2024-07-05 ASSESSMENT — COGNITIVE AND FUNCTIONAL STATUS - GENERAL
MOVING TO AND FROM BED TO CHAIR: A LOT
DRESSING REGULAR UPPER BODY CLOTHING: A LITTLE
MOBILITY SCORE: 14
TOILETING: A LITTLE
MOBILITY SCORE: 14
MOVING FROM LYING ON BACK TO SITTING ON SIDE OF FLAT BED WITH BEDRAILS: A LITTLE
TOILETING: A LITTLE
HELP NEEDED FOR BATHING: A LOT
TURNING FROM BACK TO SIDE WHILE IN FLAT BAD: A LITTLE
PATIENT BASELINE BEDBOUND: NO
WALKING IN HOSPITAL ROOM: A LITTLE
DRESSING REGULAR LOWER BODY CLOTHING: A LOT
STANDING UP FROM CHAIR USING ARMS: A LOT
DAILY ACTIVITIY SCORE: 18
WALKING IN HOSPITAL ROOM: A LITTLE
MOVING FROM LYING ON BACK TO SITTING ON SIDE OF FLAT BED WITH BEDRAILS: A LITTLE
TURNING FROM BACK TO SIDE WHILE IN FLAT BAD: A LITTLE
MOVING TO AND FROM BED TO CHAIR: A LOT
DAILY ACTIVITIY SCORE: 18
DRESSING REGULAR UPPER BODY CLOTHING: A LITTLE
HELP NEEDED FOR BATHING: A LOT
STANDING UP FROM CHAIR USING ARMS: A LOT
CLIMB 3 TO 5 STEPS WITH RAILING: TOTAL
DRESSING REGULAR LOWER BODY CLOTHING: A LOT
CLIMB 3 TO 5 STEPS WITH RAILING: TOTAL

## 2024-07-05 ASSESSMENT — ENCOUNTER SYMPTOMS
FREQUENCY: 1
CONSTITUTIONAL NEGATIVE: 1
ABDOMINAL PAIN: 1
CARDIOVASCULAR NEGATIVE: 1
RESPIRATORY NEGATIVE: 1
MUSCULOSKELETAL NEGATIVE: 1
NEUROLOGICAL NEGATIVE: 1
PSYCHIATRIC NEGATIVE: 1

## 2024-07-05 ASSESSMENT — LIFESTYLE VARIABLES
SUBSTANCE_ABUSE_PAST_12_MONTHS: NO
AUDIT-C TOTAL SCORE: 0
PRESCIPTION_ABUSE_PAST_12_MONTHS: NO
SKIP TO QUESTIONS 9-10: 1
HOW OFTEN DO YOU HAVE 6 OR MORE DRINKS ON ONE OCCASION: NEVER
AUDIT-C TOTAL SCORE: 0
HOW MANY STANDARD DRINKS CONTAINING ALCOHOL DO YOU HAVE ON A TYPICAL DAY: PATIENT DOES NOT DRINK
HOW OFTEN DO YOU HAVE A DRINK CONTAINING ALCOHOL: NEVER

## 2024-07-05 ASSESSMENT — ACTIVITIES OF DAILY LIVING (ADL)
HEARING - LEFT EAR: DIFFICULTY WITH NOISE
FEEDING YOURSELF: INDEPENDENT
DRESSING YOURSELF: NEEDS ASSISTANCE
ADEQUATE_TO_COMPLETE_ADL: NO
ASSISTIVE_DEVICE: WALKER
TOILETING: NEEDS ASSISTANCE
LACK_OF_TRANSPORTATION: NO
BATHING: NEEDS ASSISTANCE
WALKS IN HOME: NEEDS ASSISTANCE
GROOMING: NEEDS ASSISTANCE
PATIENT'S MEMORY ADEQUATE TO SAFELY COMPLETE DAILY ACTIVITIES?: YES
ASSISTIVE_DEVICE: WALKER
HEARING - RIGHT EAR: DIFFICULTY WITH NOISE
JUDGMENT_ADEQUATE_SAFELY_COMPLETE_DAILY_ACTIVITIES: NO

## 2024-07-05 ASSESSMENT — PAIN - FUNCTIONAL ASSESSMENT
PAIN_FUNCTIONAL_ASSESSMENT: 0-10

## 2024-07-05 ASSESSMENT — PAIN DESCRIPTION - LOCATION
LOCATION: ABDOMEN
LOCATION: GROIN

## 2024-07-05 ASSESSMENT — PAIN DESCRIPTION - ORIENTATION
ORIENTATION: RIGHT
ORIENTATION: LEFT

## 2024-07-05 ASSESSMENT — PAIN SCALES - GENERAL
PAINLEVEL_OUTOF10: 0 - NO PAIN
PAINLEVEL_OUTOF10: 3

## 2024-07-05 ASSESSMENT — PAIN DESCRIPTION - ONSET: ONSET: GRADUAL

## 2024-07-05 ASSESSMENT — PATIENT HEALTH QUESTIONNAIRE - PHQ9
2. FEELING DOWN, DEPRESSED OR HOPELESS: NOT AT ALL
1. LITTLE INTEREST OR PLEASURE IN DOING THINGS: NOT AT ALL
SUM OF ALL RESPONSES TO PHQ9 QUESTIONS 1 & 2: 0

## 2024-07-05 ASSESSMENT — PAIN DESCRIPTION - FREQUENCY: FREQUENCY: INTERMITTENT

## 2024-07-05 NOTE — H&P
History Of Present Illness  Tommy Richmond is a 89 y.o. 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 ED from home.  By family for altered mentation and confusion.  Vitals within normal limits.  Labs grossly within normal limits.  UA positive for leukocyte Estrace.  Chest x-ray shows no active disease.  CT abdomen pelvis without contrast performed decrease in assist to kidney, severe prostatomegaly, mild distal colonic diverticulosis, and nonspecific subcutaneous edema along the left lateral abdominal wall.  Patient received ceftriaxone and normal saline in the emergency room.  Patient admitted to telemetry with a working diagnosis of altered mental status, confusion, and UTI.    Patient states over the last 6 months he has been having episodes of not making good judgments, confusion, and hallucinations.  Patient states episodes will improve and then worsen.  Currently denies complaints of pain or shortness of breath but does complain of incontinence and increased frequency.  Denies dysuria or hematuria.  Patient states he is fallen recently and this has been getting worse.  Patient is a vague historian at times but no family are present at bedside    10 point ROS reviewed and negative except what is listed above     Past Medical History  Past Medical History:   Diagnosis Date    Personal history of other diseases of urinary system     History of bladder stone       Surgical History  Past Surgical History:   Procedure Laterality Date    OTHER SURGICAL HISTORY  10/25/2019    Inguinal hernia repair    OTHER SURGICAL HISTORY  10/25/2019    Small bowel resection    OTHER SURGICAL HISTORY  10/25/2019    Colonoscopy    OTHER SURGICAL HISTORY  10/25/2019    Cystoscopy    OTHER SURGICAL HISTORY  10/25/2019    Tonsillectomy        Social  "History  He reports that he has quit smoking. His smoking use included cigarettes. He has never used smokeless tobacco. He reports that he does not drink alcohol and does not use drugs.    Family History  Family History   Problem Relation Name Age of Onset    Colon cancer Father      Prostate cancer Father      Colon cancer Sister      Prostate cancer Brother          Allergies  Iodinated contrast media and Penicillins    Review of Systems   Constitutional: Negative.    HENT: Negative.     Respiratory: Negative.     Cardiovascular: Negative.    Gastrointestinal:  Positive for abdominal pain.   Genitourinary:  Positive for frequency.   Musculoskeletal: Negative.    Skin: Negative.    Neurological: Negative.    Psychiatric/Behavioral: Negative.          Physical Exam   General Appearance: AAO x 3, not in acute distress  Skin: skin color pink, warm, and dry; small blisterlike area noted to anterior portion of left lower leg  Eyes : PERRL, EOM's intact  ENT: mucous membranes pink and moist  Neck: normocephalic  Respiratory: lungs clear to auscultation anteriorly; no wheezing, rhonchi, or crackles.   Heart: regular rate and rhythm. telemetry shows sinus rhythm  Abdomen: Nondistended, positive bowel sounds x4, soft,  nontender  Extremities: 1+ edema noted to left arm  Peripheral pulses: normal x4 extremities  Neuro: alert, coherent and conversant, no focal motor deficits  Last Recorded Vitals  Blood pressure 146/76, pulse 85, temperature 36.3 °C (97.3 °F), temperature source Temporal, resp. rate 16, height 1.676 m (5' 5.98\"), weight 63.6 kg (140 lb 3.4 oz), SpO2 97%.    Relevant Results  CT abdomen pelvis wo IV contrast    Result Date: 7/5/2024  Interpreted By:  David Sanchez, STUDY: CT ABDOMEN PELVIS WO IV CONTRAST;  7/5/2024 12:00 pm   INDICATION: Signs/Symptoms:abdominal pain.   COMPARISON: 09/08/2015   ACCESSION NUMBER(S): [id number]   ORDERING CLINICIAN: SPENCER MONSIVAIS   TECHNIQUE: CT of the abdomen and pelvis " was performed.  Contiguous axial images were obtained at 3 mm slice thickness through the abdomen and pelvis. Coronal and sagittal reconstructions at 3 mm slice thickness were performed. The patient received no intravenous contrast. Please note evaluation of the solid organs and vessels is limited in the absence of intravenous contrast. Given this caveat, the following observations are made:   FINDINGS: LOWER CHEST: Mild bibasilar atelectasis. Coronary atherosclerosis.   ABDOMEN:   LIVER: Scattered punctate calcified granulomas.   BILE DUCTS: Not dilated.   GALLBLADDER: Contracted containing calcified stones.   PANCREAS: Unremarkable   SPLEEN: Unremarkable   ADRENAL GLANDS: Unremarkable   KIDNEYS AND URETERS: 38 mm cyst with thin calcified septation decreased in size from 54 mm. Tiny cortical cyst in the right lower pole also noted. Otherwise unremarkable kidneys without hydronephrosis.   PELVIS:   BLADDER: Partially distended.   REPRODUCTIVE ORGANS: Severe prostatomegaly.   BOWEL: Mild distal colonic diverticulosis. Right hemicolectomy changes are present. No findings of bowel obstruction. No definite inflammatory changes. Unremarkable mesentery.   VESSELS: No abdominal aortic aneurysm.Severe atherosclerosis.   PERITONEUM AND RETROPERITONEUM: Trace free fluid along the right paracolic gutter. No free air. No abdominal or pelvic lymphadenopathy.   BONE AND ABDOMINAL WALL: Ventral abdominal hernia repair with mesh again noted. There has been interval development of a loculated complex collection overlying the mesh measuring 5.7 x 2.7 cm. Mild subcutaneous edema along the left lateral wall. Osteopenia. Severe lumbar degenerative changes. Interval development of severe anterior wedging with slight osseous retropulsion at L1, probably chronic. Trace anterolisthesis L4-5. Mild lumbar dextroscoliosis.         1.  Nonspecific subcutaneous edema along the left lateral abdominal wall. 2. Small volume ascites in the right  paracolic gutter. Otherwise no additional acute findings of the abdomen or pelvis identified on noncontrast exam. 3. Since 2015, development of a chronic appearing fluid collection overlying ventral abdominal hernia repair mesh. 4. Interval development of severe chronic appearing anterior wedging at L1.   MACRO: None.   Signed by: David Sanchez 7/5/2024 12:19 PM Dictation workstation:   [id number]    XR chest 1 view    Result Date: 7/5/2024  Interpreted By:  David Sanchez, STUDY: XR CHEST 1 VIEW;  7/5/2024 11:52 am   INDICATION: Signs/Symptoms:AMS.   COMPARISON: 06/29/2024   ACCESSION NUMBER(S): [id number]   ORDERING CLINICIAN: SPENCER MONSIVAIS   FINDINGS: Small lung volumes. The lungs appear clear. No apparent pleural effusion. Unremarkable cardiomediastinal silhouette. Aortic atherosclerosis. No pulmonary vascular congestion.       No active disease in the chest identified.   MACRO: None   Signed by: David Sanchez 7/5/2024 12:02 PM Dictation workstation:   [id number]    ECG 12 lead    Result Date: 7/1/2024  Sinus rhythm with occasional Premature ventricular complexes Low voltage QRS Incomplete right bundle branch block Cannot rule out Anterior infarct , age undetermined ST & T wave abnormality, consider inferior ischemia Abnormal ECG No previous ECGs available    XR chest 1 view    Result Date: 6/29/2024  Interpreted By:  Willard Wu, STUDY: XR CHEST 1 VIEW  6/29/2024 3:16 pm   INDICATION: Signs/Symptoms:AMS   COMPARISON: 03/25/2024   ACCESSION NUMBER(S): [id number]   ORDERING CLINICIAN: SPENCER MONSIVAIS   TECHNIQUE: A single AP portable radiograph of the chest was obtained.   FINDINGS: Left basilar airspace opacity is seen and may represent scarring, atelectasis and/or pneumonia. No pneumothorax is identified. The cardiac silhouette is within normal limits for size.       Left basilar airspace opacity, as above. Clinical correlation and continued follow-up until clearing is recommended.   MACRO: None.    Signed by: Willard Wu 6/29/2024 3:44 PM Dictation workstation:   [id number]    CT cervical spine wo IV contrast    Result Date: 6/29/2024  Interpreted By:  Willard Wu, STUDY: CT CERVICAL SPINE WO IV CONTRAST; 6/29/2024 3:15 pm   INDICATION: Signs/Symptoms:fall with confusion.   COMPARISON: None.   ACCESSION NUMBER(S): [id number]   ORDERING CLINICIAN: SPENCER MONSIVAIS   TECHNIQUE: Contiguous axial CT images were obtained at  2 mm slice thickness through the cervical spine without contrast administration. The images were then reconstructed in the coronal and sagittal planes.   FINDINGS: The study is limited by difficulty with patient positioning. There is no CT evidence of acute fracture identified. No prevertebral soft tissue swelling is identified.   ALIGNMENT: The vertebral bodies are well aligned without evidence of subluxation.   VERTEBRAE/DISC SPACES: Moderate to severe disc space narrowing and small to moderate marginal osteophytes are present at the C5-6, C6-7 and C7-T1 levels. Moderate to severe facet degenerative changes are seen in the cervical spine, greater on the right than on the left.     ADDITIONAL FINDINGS: Evaluation of the visualized soft tissues of the neck is limited by the lack of intravenous contrast.  Within this limitation, no gross mass or lymphadenopathy is identified.       1.  No CT evidence of acute fracture. 2.  Degenerative changes throughout the cervical spine, as above.   Signed by: Willard Wu 6/29/2024 3:42 PM Dictation workstation:   [id number]    CT head wo IV contrast    Result Date: 6/29/2024  Interpreted By:  Willard Wu, STUDY: CT HEAD WO IV CONTRAST; 6/29/2024 3:15 pm   INDICATION: Signs/Symptoms:fall with confusion.   COMPARISON: 02/25/2022   ACCESSION NUMBER(S): [id number]   ORDERING CLINICIAN: SPENCER MONSIVAIS   TECHNIQUE: Contiguous axial CT images were obtained through the head at 5 mm slice thickness without contrast administration.   FINDINGS: The  study is severely limited by streak artifact from difficulty with patient positioning.   INTRACRANIAL: The ventricles, sulci and basal cisterns are within normal limits for size and configuration. The grey-white differentiation is intact. There is no mass effect or midline shift. There is no extraaxial fluid collection. There is no intracranial hemorrhage.  The calvarium is unremarkable.   EXTRACRANIAL: Visualized paranasal sinuses and mastoids are clear.       No evidence of acute cortical infarct or intracranial hemorrhage.   MACRO: None     Signed by: Willard Wu 2024 3:40 PM Dictation workstation:   [id number]    XR elbow left 1-2 views    Result Date: 2024  Interpreted By:  Melvina Saldana, STUDY: XR ELBOW LEFT 1-2 VIEWS; ;  2024 9:33 am   INDICATION: Signs/Symptoms:recent falls an swollen left arm.   COMPARISON: None.   ACCESSION NUMBER(S): [id number]   ORDERING CLINICIAN: GELY PHILLIPS   FINDINGS: Two views left elbow: There is no fracture or dislocation. There is no joint effusion. There is spurring of the distal mediolateral humeral condyles.       No acute bony abnormality left elbow.     MACRO: None   Signed by: Melvina Saldana 2024 8:43 AM Dictation workstation:   [id number]    Ultrasound duplex venous arm left    Result Date: 2024  Patient Info Name:     LELAND ANGEL Age:     89 years :     1935 Gender:     Male MRN:     [id number] Accession #:     [id number] Exam Date:     2024 11:05 AM Patient Status:     Outpatient Staff Sonographer:     Claudette Stewart, TONYA, RVS Referring Physician:     MAG ARANDA ; Indications     R60.9 - Edema,  unspecified      - Left arm swelling and elevated D-dimer rule out clot Procedure Description   33225 Duplex examination using B-mode, color and spectral Doppler of extremity veins including responses to compression and other maneuvers; unilateral or limited study. Conclusions   * No evidence of deep or  superficial vein thrombosis in the left upper extremity. Risk Factors   Patient has a history of hypertension. . Report Signatures Finalized by Abel Fuchs MD on 6/24/2024 11:31 AM    XR humerus left    Result Date: 6/22/2024  EXAMINATION: XR HUMERUS LEFT 2+ VIEWS (STANDARD) HISTORY: ORDERING SYSTEM PROVIDED HISTORY:  Arm injury,  TECHNOLOGIST PROVIDED HISTORY:  Illness/Other Reason for exam: arm injury Cancer History: n Surgery, RadiationHistory: n Encounter Type: Initial Additional signs and symptoms: arm swelling ORDERING SYSTEM PROVIDED DIAGNOSIS CODES: COMPARISON: None FINDINGS: Two views of the left humerus. No acute fracture of the humerus.  Diffuse soft swelling of the arm.  No soft tissue gas or radiopaque foreign body identified. Nonspecific high density lobulated foci seen at the left lower neck.    No acute fracture of the humerus. Diffuse soft tissue swelling of the left arm without soft tissue gas or radiopaque foreign body. Nonspecific high density lobulated foci partial visualized left lower neck.  Chest radiograph could be considered.  ELERTS Workstation ID:   [id number]    XR chest 1 view    Result Date: 6/22/2024  EXAMINATION: XR CHEST PA/AP HISTORY: ORDERING SYSTEM PROVIDED HISTORY:  Left upper lobe mass on arm x-ray,  TECHNOLOGIST PROVIDED HISTORY:  Illness/Other Reason for exam: possible left upper lobe mass on arm xray Cancer History: n Surgery, RadiationHistory: n Encounter Type: Initial Additional signs and symptoms: none ORDERING SYSTEM PROVIDED DIAGNOSIS CODES: COMPARISON: Left humerus series dated 06/22/2024. TECHNIQUE: 2 AP upright portable views of the chest performed. FINDINGS: The mandible and soft tissues of the chin obscure the area of interest within the medial aspect of the left lung apex/superior mediastinum. On the left humerus series, the nodules within this location are very dense suggesting the possibility of calcified lymph nodes. The trachea is unremarkable.  There is mild  enlargement of the cardiac silhouette which is partially obscured.  There is mild atheromatous calcification at the aortic arch. There is moderate elevation of the left hemidiaphragm to the level the right hilum with associated compressive atelectasis.  There is no consolidation, pleural effusion or pulmonary vascular congestion. There is no pneumothorax.  The bony structures are osteopenic.  There is no acute osseous abnormality.  There are degenerative changes at the right glenohumeral articulation.    The mandible and soft tissues of the chin obscure the area of interest within the medial aspect of the left lung apex/superior mediastinum.  On the left humerus series, the nodules within this location are very dense suggesting the possibility of calcified lymph nodes. There is moderate elevation of the left hemidiaphragm to the level of the left hilum.  There is no consolidation, pleural effusion or pulmonary vascular congestion. Workstation ID:   [id number]     Results for orders placed or performed during the hospital encounter of 07/05/24 (from the past 24 hour(s))   CBC and Auto Differential   Result Value Ref Range    WBC 5.6 4.4 - 11.3 x10*3/uL    nRBC 0.0 0.0 - 0.0 /100 WBCs    RBC 4.19 (L) 4.50 - 5.90 x10*6/uL    Hemoglobin 12.1 (L) 13.5 - 17.5 g/dL    Hematocrit 37.4 (L) 41.0 - 52.0 %    MCV 89 80 - 100 fL    MCH 28.9 26.0 - 34.0 pg    MCHC 32.4 32.0 - 36.0 g/dL    RDW 14.6 (H) 11.5 - 14.5 %    Platelets 220 150 - 450 x10*3/uL    Neutrophils % 71.8 40.0 - 80.0 %    Immature Granulocytes %, Automated 0.4 0.0 - 0.9 %    Lymphocytes % 19.9 13.0 - 44.0 %    Monocytes % 7.0 2.0 - 10.0 %    Eosinophils % 0.7 0.0 - 6.0 %    Basophils % 0.2 0.0 - 2.0 %    Neutrophils Absolute 4.00 1.60 - 5.50 x10*3/uL    Immature Granulocytes Absolute, Automated 0.02 0.00 - 0.50 x10*3/uL    Lymphocytes Absolute 1.11 0.80 - 3.00 x10*3/uL    Monocytes Absolute 0.39 0.05 - 0.80 x10*3/uL    Eosinophils Absolute 0.04 0.00 - 0.40 x10*3/uL     Basophils Absolute 0.01 0.00 - 0.10 x10*3/uL   Comprehensive metabolic panel   Result Value Ref Range    Glucose 84 74 - 99 mg/dL    Sodium 137 136 - 145 mmol/L    Potassium 4.0 3.5 - 5.3 mmol/L    Chloride 106 98 - 107 mmol/L    Bicarbonate 25 21 - 32 mmol/L    Anion Gap 10 10 - 20 mmol/L    Urea Nitrogen 46 (H) 6 - 23 mg/dL    Creatinine 1.27 0.50 - 1.30 mg/dL    eGFR 55 (L) >60 mL/min/1.73m*2    Calcium 8.7 8.6 - 10.3 mg/dL    Albumin 3.5 3.4 - 5.0 g/dL    Alkaline Phosphatase 79 33 - 136 U/L    Total Protein 5.9 (L) 6.4 - 8.2 g/dL    AST 61 (H) 9 - 39 U/L    Bilirubin, Total 1.2 0.0 - 1.2 mg/dL    ALT 48 10 - 52 U/L   Magnesium   Result Value Ref Range    Magnesium 2.12 1.60 - 2.40 mg/dL   Troponin I, High Sensitivity   Result Value Ref Range    Troponin I, High Sensitivity 19 0 - 20 ng/L   Urinalysis with Reflex Culture and Microscopic   Result Value Ref Range    Color, Urine Yellow Light-Yellow, Yellow, Dark-Yellow    Appearance, Urine Clear Clear    Specific Gravity, Urine 1.017 1.005 - 1.035    pH, Urine 6.0 5.0, 5.5, 6.0, 6.5, 7.0, 7.5, 8.0    Protein, Urine NEGATIVE NEGATIVE, 10 (TRACE), 20 (TRACE) mg/dL    Glucose, Urine Normal Normal mg/dL    Blood, Urine NEGATIVE NEGATIVE    Ketones, Urine TRACE (A) NEGATIVE mg/dL    Bilirubin, Urine NEGATIVE NEGATIVE    Urobilinogen, Urine Normal Normal mg/dL    Nitrite, Urine NEGATIVE NEGATIVE    Leukocyte Esterase, Urine 250 Darius/µL (A) NEGATIVE   Microscopic Only, Urine   Result Value Ref Range    WBC, Urine 6-10 (A) 1-5, NONE /HPF    RBC, Urine 1-2 NONE, 1-2, 3-5 /HPF     Scheduled medications  apixaban, 5 mg, oral, BID  cefTRIAXone, 1 g, intravenous, q24h  [START ON 7/6/2024] finasteride, 5 mg, oral, Daily  latanoprost, 1 drop, Both Eyes, Nightly  [START ON 7/6/2024] losartan 100 mg, hydroCHLOROthiazide 25 mg for Hyzaar 100/25, , oral, Daily  [START ON 7/6/2024] metoprolol tartrate, 25 mg, oral, Daily  [START ON 7/6/2024] oxybutynin, 5 mg, oral, BID  pneumoc  20-mando conj-dip cr(PF), 0.5 mL, intramuscular, During hospitalization  tamsulosin, 0.4 mg, oral, Nightly      Continuous medications     PRN medications  PRN medications: acetaminophen, ondansetron ODT **OR** ondansetron    Assessment/Plan   Principal Problem:    Altered mental status, unspecified    Tommy Richmond is a 89 y.o. 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  UA positive for leukocyte Estrace.  Chest x-ray shows no active disease.  CT abdomen pelvis without contrast performed decrease in assist to kidney, severe prostatomegaly, mild distal colonic diverticulosis, and nonspecific subcutaneous edema along the left lateral abdominal wall.   Patient admitted to telemetry with a working diagnosis of altered mental status, confusion, and UTI.    Plan:  Altered mental status/confusion  -Patient currently alert and oriented  -Patient states he has been having periods of confusion and hallucinations over the last 6 months    UTI  -Patient received treatment for recent UTI was positive for Proteus Mirabella's  -Continue Rocephin    Weakness/frequent falls  -PT OT consulted  -Will order x-ray of left shoulder for complaints of pain    Left arm edema  -Patient had a left arm duplex completed on 6/24 that was negative for DVT.  Patient had x-ray to left elbow and humerus that were negative for fracture  -Continue to monitor    BPH  -Continue Flomax and oxybutynin    Hypertension  -Continue Hyzaar and metoprolol    DVT prophylaxis: Continue Eliquis.  According to pharmacy notes patient only took 1 dose but is listed under PCPs med list.  Spoke with pharmacist and he reached out to son, patient was instructed by PCP to continue Eliquis for another 2 weeks even though  left arm ultrasound was negative for DVT    Discharge disposition: Anticipate patient to return home when medically stable.  He assisting caring for his wife with Alzheimer's.  He states family is looking at assisted living's.    CODE STATUS reviewed and patient was to be full code at this time       I spent 45 minutes in the professional and overall care of this patient.      Agata Cohen, APRN-CNP

## 2024-07-05 NOTE — CARE PLAN
The patient's goals for the shift include      The clinical goals for the shift include use call light for any needs      Problem: Skin  Goal: Promote skin healing  Outcome: Progressing     Problem: Skin  Goal: Decreased wound size/increased tissue granulation at next dressing change  Outcome: Progressing  Goal: Participates in plan/prevention/treatment measures  Outcome: Progressing  Goal: Prevent/manage excess moisture  Outcome: Progressing  Goal: Prevent/minimize sheer/friction injuries  Outcome: Progressing  Goal: Promote/optimize nutrition  Outcome: Progressing  Goal: Promote skin healing  Outcome: Progressing     Problem: Nutrition  Goal: Less than 5 days NPO/clear liquids  Outcome: Progressing  Goal: Oral intake greater than 50%  Outcome: Progressing  Goal: Oral intake greater 75%  Outcome: Progressing  Goal: Consume prescribed supplement  Outcome: Progressing  Goal: Adequate PO fluid intake  Outcome: Progressing  Goal: Nutrition support goals are met within 48 hrs  Outcome: Progressing  Goal: Nutrition support is meeting 75% of nutrient needs  Outcome: Progressing  Goal: Tube feed tolerance  Outcome: Progressing  Goal: BG  mg/dL  Outcome: Progressing  Goal: Lab values WNL  Outcome: Progressing  Goal: Electrolytes WNL  Outcome: Progressing  Goal: Promote healing  Outcome: Progressing  Goal: Maintain stable weight  Outcome: Progressing  Goal: Reduce weight from edema/fluid  Outcome: Progressing  Goal: Gradual weight gain  Outcome: Progressing  Goal: Improve ostomy output  Outcome: Progressing     Problem: Pain - Adult  Goal: Verbalizes/displays adequate comfort level or baseline comfort level  Outcome: Progressing     Problem: Safety - Adult  Goal: Free from fall injury  Outcome: Progressing     Problem: Discharge Planning  Goal: Discharge to home or other facility with appropriate resources  Outcome: Progressing     Problem: Chronic Conditions and Co-morbidities  Goal: Patient's chronic conditions and  co-morbidity symptoms are monitored and maintained or improved  Outcome: Progressing

## 2024-07-05 NOTE — CARE PLAN
The patient's goals for the shift include      The clinical goals for the shift include use call light for any needs        Problem: Skin  Goal: Decreased wound size/increased tissue granulation at next dressing change  Outcome: Progressing  Goal: Participates in plan/prevention/treatment measures  Outcome: Progressing  Goal: Prevent/manage excess moisture  Outcome: Progressing  Goal: Prevent/minimize sheer/friction injuries  Outcome: Progressing  Goal: Promote/optimize nutrition  Outcome: Progressing  Goal: Promote skin healing  Outcome: Progressing     Problem: Nutrition  Goal: Less than 5 days NPO/clear liquids  Outcome: Progressing  Goal: Oral intake greater than 50%  Outcome: Progressing  Goal: Oral intake greater 75%  Outcome: Progressing  Goal: Consume prescribed supplement  Outcome: Progressing  Goal: Adequate PO fluid intake  Outcome: Progressing  Goal: Nutrition support goals are met within 48 hrs  Outcome: Progressing  Goal: Nutrition support is meeting 75% of nutrient needs  Outcome: Progressing  Goal: Tube feed tolerance  Outcome: Progressing  Goal: BG  mg/dL  Outcome: Progressing  Goal: Lab values WNL  Outcome: Progressing  Goal: Electrolytes WNL  Outcome: Progressing  Goal: Promote healing  Outcome: Progressing  Goal: Maintain stable weight  Outcome: Progressing  Goal: Reduce weight from edema/fluid  Outcome: Progressing  Goal: Gradual weight gain  Outcome: Progressing  Goal: Improve ostomy output  Outcome: Progressing     Problem: Pain - Adult  Goal: Verbalizes/displays adequate comfort level or baseline comfort level  Outcome: Progressing     Problem: Safety - Adult  Goal: Free from fall injury  Outcome: Progressing     Problem: Discharge Planning  Goal: Discharge to home or other facility with appropriate resources  Outcome: Progressing     Problem: Chronic Conditions and Co-morbidities  Goal: Patient's chronic conditions and co-morbidity symptoms are monitored and maintained or  improved  Outcome: Progressing

## 2024-07-05 NOTE — ED PROVIDER NOTES
HPI   Chief Complaint   Patient presents with    Altered Mental Status     Pt brought in by family for confusion. Pt was seen here recently for a similar thing. Pt was dx with a UTI and placed on an antibiotic and seem to get better. Followed up with PCP and placed on another one and told that if he did not improve to come back to the ER. 3 days ago pt started to act different per family and asked to come to the hospital.        Limitations to History: None    HPI: 89-year-old male presents with concern for altered mental status.  Patient seen here 6 days ago and diagnosed with a UTI.  Placed on antibiotic therapy.  Followed up with primary care because he is still having intermittent altered mentation.  Patient switched antibiotics.  Patient continues to have intermittent altered mentation.  Complaining of lower abdominal pain.  Denies any chest pain, shortness of breath, nausea, vomiting, urinary symptoms.    Additional History Obtained from: Family at the bedside.    ------------------------------------------------------------------------------------------------------------------------------------------  Physical Exam:    VS: As documented in the triage note and EMR flowsheet from this visit were reviewed.    Appearance: Alert. cooperative,  in no acute distress.   Skin: Intact,  dry skin, no lesions, rash, petechiae or purpura.   Eyes: PERRLA, EOMs intact,  Conjunctiva pink with no redness or exudates.   HENT: Normocephalic, atraumatic. Nares patent. No intraoral lesions.   Neck: Supple, without meningismus. Trachea at midline. No lymphadenopathy.  Pulmonary: Clear bilaterally with good chest wall excursion. No rales, rhonchi or wheezing. No accessory muscle use or stridor.  Cardiac: Regular rate and rhythm, no rubs, murmurs, or gallops.   Abdomen: Abdomen is soft, nontender, and nondistended.  No palpable organomegaly.  No rebound or guarding.  No CVA tenderness. Nonsurgical abdomen.  Genitourinary: Exam  deferred.  Musculoskeletal: Full range of motion.  Pulses full and equal. No cyanosis, clubbing, or edema.  Neurological:  Cranial nerves are grossly intact, grossly normal sensation, no weakness, no focal findings identified.  Psychiatric: Appropriate mood and affect.                            No data recorded                   Patient History   Past Medical History:   Diagnosis Date    Personal history of other diseases of urinary system     History of bladder stone     Past Surgical History:   Procedure Laterality Date    OTHER SURGICAL HISTORY  10/25/2019    Inguinal hernia repair    OTHER SURGICAL HISTORY  10/25/2019    Small bowel resection    OTHER SURGICAL HISTORY  10/25/2019    Colonoscopy    OTHER SURGICAL HISTORY  10/25/2019    Cystoscopy    OTHER SURGICAL HISTORY  10/25/2019    Tonsillectomy     Family History   Problem Relation Name Age of Onset    Colon cancer Father      Prostate cancer Father      Colon cancer Sister      Prostate cancer Brother       Social History     Tobacco Use    Smoking status: Former     Types: Cigarettes    Smokeless tobacco: Never   Vaping Use    Vaping status: Never Used   Substance Use Topics    Alcohol use: Never    Drug use: Never       Physical Exam   ED Triage Vitals [07/05/24 1132]   Temperature Heart Rate Respirations BP   36.6 °C (97.9 °F) 78 17 123/69      Pulse Ox Temp Source Heart Rate Source Patient Position   97 % Temporal -- Sitting      BP Location FiO2 (%)     Right arm --       Physical Exam    ED Course & MDM   Diagnoses as of 07/05/24 1407   Altered mental status, unspecified altered mental status type   Weakness       Medical Decision Making  Labs Reviewed  CBC WITH AUTO DIFFERENTIAL - Abnormal     WBC                           5.6                    nRBC                          0.0                    RBC                           4.19 (*)               Hemoglobin                    12.1 (*)               Hematocrit                    37.4 (*)                MCV                           89                     MCH                           28.9                   MCHC                          32.4                   RDW                           14.6 (*)               Platelets                     220                    Neutrophils %                 71.8                   Immature Granulocytes %, Automated   0.4                    Lymphocytes %                 19.9                   Monocytes %                   7.0                    Eosinophils %                 0.7                    Basophils %                   0.2                    Neutrophils Absolute          4.00                   Immature Granulocytes Absolute, Au*   0.02                   Lymphocytes Absolute          1.11                   Monocytes Absolute            0.39                   Eosinophils Absolute          0.04                   Basophils Absolute            0.01                COMPREHENSIVE METABOLIC PANEL - Abnormal     Glucose                       84                     Sodium                        137                    Potassium                     4.0                    Chloride                      106                    Bicarbonate                   25                     Anion Gap                     10                     Urea Nitrogen                 46 (*)                 Creatinine                    1.27                   eGFR                          55 (*)                 Calcium                       8.7                    Albumin                       3.5                    Alkaline Phosphatase          79                     Total Protein                 5.9 (*)                AST                           61 (*)                 Bilirubin, Total              1.2                    ALT                           48                  URINALYSIS WITH REFLEX CULTURE AND MICROSCOPIC - Abnormal     Color, Urine                  Yellow                 Appearance, Urine              Clear                  Specific Gravity, Urine       1.017                  pH, Urine                     6.0                    Protein, Urine                NEGATIVE                Glucose, Urine                Normal                 Blood, Urine                  NEGATIVE                Ketones, Urine                TRACE (*)               Bilirubin, Urine              NEGATIVE                Urobilinogen, Urine           Normal                 Nitrite, Urine                NEGATIVE                Leukocyte Esterase, Urine     250 Darius/µL (*)            MICROSCOPIC ONLY, URINE - Abnormal     WBC, Urine                    6-10 (*)               RBC, Urine                    1-2                 MAGNESIUM - Normal     Magnesium                     2.12                TROPONIN I, HIGH SENSITIVITY - Normal     Troponin I, High Sensitivity   19                         Narrative: Less than 99th percentile of normal range cutoff-                  Female and children under 18 years old <14 ng/L; Male <21 ng/L: Negative                  Repeat testing should be performed if clinically indicated.                                     Female and children under 18 years old 14-50 ng/L; Male 21-50 ng/L:                  Consistent with possible cardiac damage and possible increased clinical                   risk. Serial measurements may help to assess extent of myocardial damage.                                     >50 ng/L: Consistent with cardiac damage, increased clinical risk and                  myocardial infarction. Serial measurements may help assess extent of                   myocardial damage.                                      NOTE: Children less than 1 year old may have higher baseline troponin                   levels and results should be interpreted in conjunction with the overall                   clinical context.                                     NOTE: Troponin I testing is performed using a different                    testing methodology at [facility] than at other                   [facility]. Direct result comparisons should only                   be made within the same method.  URINE CULTURE  URINALYSIS WITH REFLEX CULTURE AND MICROSCOPIC         Narrative: The following orders were created for panel order Urinalysis with Reflex Culture and Microscopic.                  Procedure                               Abnormality         Status                                     ---------                               -----------         ------                                     Urinalysis with Reflex C...[[id number]]  Abnormal            Final result                               Extra Urine Gray Tube[]                            In process                                                   Please view results for these tests on the individual orders.  EXTRA URINE GRAY TUBE  CT abdomen pelvis wo IV contrast   Final Result    1.  Nonspecific subcutaneous edema along the left lateral abdominal    wall.    2. Small volume ascites in the right paracolic gutter. Otherwise no    additional acute findings of the abdomen or pelvis identified on    noncontrast exam.    3. Since , development of a chronic appearing fluid collection    overlying ventral abdominal hernia repair mesh.    4. Interval development of severe chronic appearing anterior wedging    at L1.          MACRO:    None.          Signed by: David Sanchez 2024 12:19 PM    Dictation workstation:   [id number]     XR chest 1 view   Final Result    No active disease in the chest identified.          MACRO:    None          Signed by: David Sanchez 2024 12:02 PM    Dictation workstation:   [id number]     Medical Decision Makin-year-old male presents with weakness and confusion. Present intermittently over the past 1 week. Patient seen initially by myself 6 days ago. Workup negative. Treated with fluid hydration given  acute renal insufficiency. Patient continues to have intermittent altered mentation. Did have urinary tract infection which I treated and his antibiotics were switched as an outpatient. Was having some mild abdominal pain. CT of his abdomen performed which shows no acute process. Patient likely benefit from admission for further evaluation and possible social work and physical therapy evaluation.  Case discussed with hospitalist who is agreeable with admission and patient admitted in stable condition.    Differential Diagnoses Considered: Continued UTI, volume depletion, electrolyte abnormality, infectious process, cognitive decline    Independent Interpretation of Studies:  I independently interpreted: CT of the abdomen pelvis shows no evidence of free air.  Chest x-ray shows no evidence of pneumonia or pneumothorax.    Escalation of Care:  Appropriate for admission for further treatment and evaluation.    Discussion of Management with Other Providers:   I discussed the patient/results with: Admitting hospitalist.          Procedure  ECG 12 lead    Performed by: Sukhdev Fraser DO  Authorized by: Sukhdev Fraser DO    ECG interpreted by ED Physician in the absence of a cardiologist: yes    Comments:      EKG interpreted by Dr. Sukhdev Fraser: Normal sinus rhythm at 65 bpm.  IA interval 180 ms.  QTc of 430 ms.  Right bundle branch block.  PVCs.  Nonspecific ST changes.       Sukhdev Fraser DO  07/05/24 [id number]

## 2024-07-05 NOTE — PROGRESS NOTES
Tommy Richmond is a 89 y.o. male on day 0 of admission presenting with Altered mental status, unspecified.    Referral for MST score 3 -patient is on hospice service to be discharged home today to continue on hospice care.  No intervention at this time.   Available if needed    Paty Cavazos RDN, LD

## 2024-07-06 PROBLEM — R41.82 ALTERED MENTAL STATUS, UNSPECIFIED ALTERED MENTAL STATUS TYPE: Status: ACTIVE | Noted: 2024-07-06

## 2024-07-06 LAB
ANION GAP SERPL CALC-SCNC: 13 MMOL/L (ref 10–20)
ATRIAL RATE: 77 BPM
BUN SERPL-MCNC: 40 MG/DL (ref 6–23)
CALCIUM SERPL-MCNC: 8.2 MG/DL (ref 8.6–10.3)
CHLORIDE SERPL-SCNC: 106 MMOL/L (ref 98–107)
CO2 SERPL-SCNC: 21 MMOL/L (ref 21–32)
CREAT SERPL-MCNC: 1.26 MG/DL (ref 0.5–1.3)
EGFRCR SERPLBLD CKD-EPI 2021: 55 ML/MIN/1.73M*2
ERYTHROCYTE [DISTWIDTH] IN BLOOD BY AUTOMATED COUNT: 14.6 % (ref 11.5–14.5)
GLUCOSE SERPL-MCNC: 83 MG/DL (ref 74–99)
HCT VFR BLD AUTO: 37.5 % (ref 41–52)
HGB BLD-MCNC: 12.2 G/DL (ref 13.5–17.5)
MCH RBC QN AUTO: 29 PG (ref 26–34)
MCHC RBC AUTO-ENTMCNC: 32.5 G/DL (ref 32–36)
MCV RBC AUTO: 89 FL (ref 80–100)
NRBC BLD-RTO: 0 /100 WBCS (ref 0–0)
P AXIS: 32 DEGREES
P OFFSET: 191 MS
P ONSET: 131 MS
PLATELET # BLD AUTO: 214 X10*3/UL (ref 150–450)
POTASSIUM SERPL-SCNC: 3.7 MMOL/L (ref 3.5–5.3)
PR INTERVAL: 188 MS
Q ONSET: 225 MS
QRS COUNT: 13 BEATS
QRS DURATION: 118 MS
QT INTERVAL: 414 MS
QTC CALCULATION(BAZETT): 468 MS
QTC FREDERICIA: 449 MS
R AXIS: -29 DEGREES
RBC # BLD AUTO: 4.21 X10*6/UL (ref 4.5–5.9)
SODIUM SERPL-SCNC: 136 MMOL/L (ref 136–145)
T AXIS: -32 DEGREES
T OFFSET: 432 MS
VENTRICULAR RATE: 77 BPM
WBC # BLD AUTO: 6 X10*3/UL (ref 4.4–11.3)

## 2024-07-06 PROCEDURE — 85027 COMPLETE CBC AUTOMATED: CPT

## 2024-07-06 PROCEDURE — 36415 COLL VENOUS BLD VENIPUNCTURE: CPT

## 2024-07-06 PROCEDURE — 80048 BASIC METABOLIC PNL TOTAL CA: CPT

## 2024-07-06 PROCEDURE — 1200000002 HC GENERAL ROOM WITH TELEMETRY DAILY

## 2024-07-06 PROCEDURE — 2500000002 HC RX 250 W HCPCS SELF ADMINISTERED DRUGS (ALT 637 FOR MEDICARE OP, ALT 636 FOR OP/ED)

## 2024-07-06 PROCEDURE — 96365 THER/PROPH/DIAG IV INF INIT: CPT

## 2024-07-06 PROCEDURE — 2500000004 HC RX 250 GENERAL PHARMACY W/ HCPCS (ALT 636 FOR OP/ED)

## 2024-07-06 PROCEDURE — 97162 PT EVAL MOD COMPLEX 30 MIN: CPT | Mod: GP | Performed by: PHYSICAL THERAPIST

## 2024-07-06 PROCEDURE — 99232 SBSQ HOSP IP/OBS MODERATE 35: CPT

## 2024-07-06 PROCEDURE — 2500000001 HC RX 250 WO HCPCS SELF ADMINISTERED DRUGS (ALT 637 FOR MEDICARE OP)

## 2024-07-06 RX ORDER — ENOXAPARIN SODIUM 100 MG/ML
40 INJECTION SUBCUTANEOUS EVERY 24 HOURS
Status: DISCONTINUED | OUTPATIENT
Start: 2024-07-06 | End: 2024-07-06

## 2024-07-06 RX ORDER — POLYETHYLENE GLYCOL 3350 17 G/17G
17 POWDER, FOR SOLUTION ORAL DAILY
Status: DISCONTINUED | OUTPATIENT
Start: 2024-07-06 | End: 2024-07-08 | Stop reason: HOSPADM

## 2024-07-06 ASSESSMENT — COGNITIVE AND FUNCTIONAL STATUS - GENERAL
MOVING FROM LYING ON BACK TO SITTING ON SIDE OF FLAT BED WITH BEDRAILS: A LITTLE
MOVING FROM LYING ON BACK TO SITTING ON SIDE OF FLAT BED WITH BEDRAILS: A LITTLE
MOBILITY SCORE: 16
TURNING FROM BACK TO SIDE WHILE IN FLAT BAD: A LITTLE
MOBILITY SCORE: 14
TOILETING: A LOT
MOVING TO AND FROM BED TO CHAIR: A LOT
CLIMB 3 TO 5 STEPS WITH RAILING: A LOT
HELP NEEDED FOR BATHING: A LOT
MOVING FROM LYING ON BACK TO SITTING ON SIDE OF FLAT BED WITH BEDRAILS: A LITTLE
MOVING TO AND FROM BED TO CHAIR: A LITTLE
DRESSING REGULAR LOWER BODY CLOTHING: A LOT
MOVING TO AND FROM BED TO CHAIR: A LOT
CLIMB 3 TO 5 STEPS WITH RAILING: TOTAL
STANDING UP FROM CHAIR USING ARMS: A LITTLE
WALKING IN HOSPITAL ROOM: A LOT
WALKING IN HOSPITAL ROOM: A LOT
MOBILITY SCORE: 14
STANDING UP FROM CHAIR USING ARMS: A LOT
CLIMB 3 TO 5 STEPS WITH RAILING: TOTAL
DRESSING REGULAR UPPER BODY CLOTHING: A LOT
STANDING UP FROM CHAIR USING ARMS: A LITTLE
DAILY ACTIVITIY SCORE: 16
TURNING FROM BACK TO SIDE WHILE IN FLAT BAD: A LITTLE
TURNING FROM BACK TO SIDE WHILE IN FLAT BAD: A LITTLE
WALKING IN HOSPITAL ROOM: A LITTLE

## 2024-07-06 ASSESSMENT — PAIN - FUNCTIONAL ASSESSMENT
PAIN_FUNCTIONAL_ASSESSMENT: 0-10

## 2024-07-06 ASSESSMENT — PAIN SCALES - GENERAL
PAINLEVEL_OUTOF10: 0 - NO PAIN
PAINLEVEL_OUTOF10: 5 - MODERATE PAIN
PAINLEVEL_OUTOF10: 3
PAINLEVEL_OUTOF10: 3
PAINLEVEL_OUTOF10: 0 - NO PAIN

## 2024-07-06 ASSESSMENT — ACTIVITIES OF DAILY LIVING (ADL): LACK_OF_TRANSPORTATION: NO

## 2024-07-06 ASSESSMENT — PAIN DESCRIPTION - LOCATION
LOCATION: GENERALIZED
LOCATION: GROIN

## 2024-07-06 ASSESSMENT — PAIN DESCRIPTION - ORIENTATION: ORIENTATION: RIGHT

## 2024-07-06 NOTE — PROGRESS NOTES
Physical Therapy    Physical Therapy Evaluation    Patient Name: Tommy Richmond  MRN: [id number]  Today's Date: 7/6/2024   Time Calculation  Start Time: 0853  Stop Time: 0917  Time Calculation (min): 24 min    Assessment/Plan   PT Assessment  PT Assessment Results: Decreased strength, Impaired balance, Decreased endurance, Decreased mobility, Pain  Rehab Prognosis: Good  Barriers to Discharge: None  Barriers to Participation:  (None)  End of Session Communication: PCT/NA/CTA  Assessment Comment: Pt presents with medical dx of AMS and UTI. Pt presents with increased weakness, decreased ability for bed mobility, tranfsers, stairs and gait funcitonal distances.  Pt would benefit from PT rx to improve on these deficits and to maximize his safety with functional mobilty.  End of Session Patient Position: Up in chair, Alarm on  IP OR SWING BED PT PLAN  Inpatient or Swing Bed: Inpatient  PT Plan  Treatment/Interventions: Bed mobility, Transfer training, Gait training, Stair training, Balance training, Strengthening, Endurance training, Home exercise program, Therapeutic exercise, Therapeutic activity  PT Plan: Ongoing PT  PT Frequency: 5 times per week  PT Discharge Recommendations: Moderate intensity level of continued care      Subjective   Pt reports that he is doing ok today and is agreeable to PT evaluation this morning.    General Visit Information:  General  Reason for Referral: Dx:  AMS, UTI  Referred By: Agata Cohen CNP  Past Medical History Relevant to Rehab: PMH:  CKD-3, Macular Degeneration to R, Colon CA, BPH, HTN  Prior to Session Communication: PCT/NA/CTA  Patient Position Received: Up in chair, Alarm on    Home Living/PLOF:  Home Living  Type of Home:  (Pt lives with his mother.  3-4 entry steps to enter.  Lives on 1st floor with walk in shower with chair and grab bars.  Pt owns FWW and rollator.  Pt reports that he was I with most ADLS and mobiltiy with his FWW  prior.)    Precautions:  Precautions  Precautions Comment: Fall Risk      Objective     Pain:  Pain Assessment  Pain Assessment: 0-10  0-10 (Numeric) Pain Score: 5 - Moderate pain  Pain Type: Chronic pain  Pain Location:  (abdominal and LBP)    Cognition:  Cognition  Overall Cognitive Status: Within Functional Limits    General Assessments:   Activity Tolerance  Endurance:  (Fair activity tolerance)    Sensation  Light Touch: No apparent deficits    Strength  Strength Comments: B/L LE strength grossly 4-/5 throughout    Functional Assessments:  Bed Mobility  Bed Mobility:  (NT pt was up in a chair)    Transfers  Transfer:  (Sit to stand and stand to sit Min A x 1 with use of FWW.  Pt also tranfered on/off toilet with Min A x 1 and use of FWW.)    Ambulation/Gait Training  Ambulation/Gait Training Performed:  (Pt amb 35 ft CGA with use of FWW.  Slower jaqui and smaller steps.  Forward bent/hunched posture due to spinal curvature)    Outcome Measures:  [facility] Basic Mobility  Turning from your back to your side while in a flat bed without using bedrails: A little  Moving from lying on your back to sitting on the side of a flat bed without using bedrails: A little  Moving to and from bed to chair (including a wheelchair): A little  Standing up from a chair using your arms (e.g. wheelchair or bedside chair): A little  To walk in hospital room: A little  Climbing 3-5 steps with railing: Total  Basic Mobility - Total Score: 16    Encounter Problems       Encounter Problems (Active)       PT Problem       PT Goal 1       Start:  24    Expected End:  24       ST)  Pt will be  Mod I with use of FWW  with Sit to stand and bed to chair tranfers with good safety in order to facilitate safe functional mobility.    2)  Pt will be  Mod I  and safe with bed mobility, supine to sit and sit to supine transfers safely inorder to facilitate safe functional mobility.    3)  Pt will be able to amb >= 100  ft  SBA-Mod I  with FWW   safely in order to facilitate safe mobility.      4)   Pt will improve b/l LE strength to >= 4+/5 throughout in order to facilitate safe gait and mobility.      5)  Pt will be able to negotiate 3-4 steps CGA-SBA with use of HR safely in order to enter/exit the house if returning home at DC.    6)  Pt will be I with HEP with use of handouts as needed in order to maximize strength and safety with mobility.                    Pain - Adult              Education Documentation  Mobility Training, taught by Earl Grey, PT at 7/6/2024 10:35 AM.  Learner: Patient  Readiness: Acceptance  Method: Explanation, Demonstration  Response: Verbalizes Understanding, Demonstrated Understanding  Comment: PT edu pt on PT POC/course of rx and safety cues with tranfers.    Education Comments  No comments found.

## 2024-07-06 NOTE — CARE PLAN
Problem: Skin  Goal: Decreased wound size/increased tissue granulation at next dressing change  Outcome: Progressing  Flowsheets (Taken 7/6/2024 0816)  Decreased wound size/increased tissue granulation at next dressing change: Promote sleep for wound healing  Goal: Participates in plan/prevention/treatment measures  Outcome: Progressing  Flowsheets (Taken 7/6/2024 0816)  Participates in plan/prevention/treatment measures: Discuss with provider PT/OT consult  Goal: Prevent/manage excess moisture  Outcome: Progressing  Flowsheets (Taken 7/6/2024 0816)  Prevent/manage excess moisture: Moisturize dry skin  Goal: Prevent/minimize sheer/friction injuries  Outcome: Progressing  Flowsheets (Taken 7/6/2024 0816)  Prevent/minimize sheer/friction injuries: Use pull sheet  Goal: Promote/optimize nutrition  Outcome: Progressing  Flowsheets (Taken 7/6/2024 0816)  Promote/optimize nutrition: Consume > 50% meals/supplements  Goal: Promote skin healing  Outcome: Progressing  Flowsheets (Taken 7/6/2024 0816)  Promote skin healing: Turn/reposition every 2 hours/use positioning/transfer devices     Problem: Nutrition  Goal: Less than 5 days NPO/clear liquids  Outcome: Progressing  Goal: Oral intake greater than 50%  Outcome: Progressing  Goal: Oral intake greater 75%  Outcome: Progressing  Goal: Consume prescribed supplement  Outcome: Progressing  Goal: Adequate PO fluid intake  Outcome: Progressing  Goal: Nutrition support goals are met within 48 hrs  Outcome: Progressing  Goal: Nutrition support is meeting 75% of nutrient needs  Outcome: Progressing  Goal: Tube feed tolerance  Outcome: Progressing  Goal: BG  mg/dL  Outcome: Progressing  Goal: Lab values WNL  Outcome: Progressing  Goal: Electrolytes WNL  Outcome: Progressing  Goal: Promote healing  Outcome: Progressing  Goal: Maintain stable weight  Outcome: Progressing  Goal: Reduce weight from edema/fluid  Outcome: Progressing  Goal: Gradual weight gain  Outcome:  Progressing  Goal: Improve ostomy output  Outcome: Progressing     Problem: Pain - Adult  Goal: Verbalizes/displays adequate comfort level or baseline comfort level  Outcome: Progressing     Problem: Safety - Adult  Goal: Free from fall injury  Outcome: Progressing     Problem: Discharge Planning  Goal: Discharge to home or other facility with appropriate resources  Outcome: Progressing     Problem: Chronic Conditions and Co-morbidities  Goal: Patient's chronic conditions and co-morbidity symptoms are monitored and maintained or improved  Outcome: Progressing   The patient's goals for the shift include      The clinical goals for the shift include utilize call light    Over the shift, the patient did not make progress toward the following goals. Barriers to progression include . Recommendations to address these barriers include .

## 2024-07-06 NOTE — CARE PLAN
Problem: Skin  Goal: Decreased wound size/increased tissue granulation at next dressing change  7/5/2024 2140 by Yamilet Schulz RN  Outcome: Progressing  7/5/2024 2136 by Yamilet Schulz RN  Outcome: Progressing  Goal: Participates in plan/prevention/treatment measures  7/5/2024 2140 by Yamilet Schulz RN  Outcome: Progressing  7/5/2024 2136 by Yamilet Schulz RN  Outcome: Progressing  Goal: Prevent/manage excess moisture  7/5/2024 2140 by Yamilet Schulz RN  Outcome: Progressing  7/5/2024 2136 by Yamilet Schulz RN  Outcome: Progressing  Goal: Prevent/minimize sheer/friction injuries  7/5/2024 2140 by Yamilet Schulz RN  Outcome: Progressing  7/5/2024 2136 by Yamilet Schulz RN  Outcome: Progressing  Goal: Promote/optimize nutrition  7/5/2024 2140 by Yamilet Schulz RN  Outcome: Progressing  7/5/2024 2136 by Yamilet Schulz RN  Outcome: Progressing  Goal: Promote skin healing  7/5/2024 2140 by Yamilet Schulz RN  Outcome: Progressing  7/5/2024 2136 by Yamilet Schulz RN  Outcome: Progressing     Problem: Nutrition  Goal: Less than 5 days NPO/clear liquids  7/5/2024 2140 by Yamilet Schulz RN  Outcome: Progressing  7/5/2024 2136 by Yamilet Schulz RN  Outcome: Progressing  Goal: Oral intake greater than 50%  7/5/2024 2140 by Yamilet Schulz RN  Outcome: Progressing  7/5/2024 2136 by Yamilet Schulz RN  Outcome: Progressing  Goal: Oral intake greater 75%  7/5/2024 2140 by Yamilet Schulz RN  Outcome: Progressing  7/5/2024 2136 by Yamilet Schulz RN  Outcome: Progressing  Goal: Consume prescribed supplement  7/5/2024 2140 by Yamilet Schulz RN  Outcome: Progressing  7/5/2024 2136 by Yamilet Schulz RN  Outcome: Progressing  Goal: Adequate PO fluid intake  7/5/2024 2140 by Yamilet Schulz RN  Outcome: Progressing  7/5/2024 2136 by Yamilet Schulz RN  Outcome: Progressing  Goal: Nutrition support goals are met within 48 hrs  7/5/2024 2140 by Yamilet Schulz, RN  Outcome: Progressing  7/5/2024 2136 by Yamilet Schulz, RN  Outcome: Progressing  Goal: Nutrition support is meeting 75% of  nutrient needs  7/5/2024 2140 by Yamilet Schulz RN  Outcome: Progressing  7/5/2024 2136 by Yamilet Schulz RN  Outcome: Progressing  Goal: Tube feed tolerance  7/5/2024 2140 by Yamilet Schulz RN  Outcome: Progressing  7/5/2024 2136 by Yamilet Schulz RN  Outcome: Progressing  Goal: BG  mg/dL  7/5/2024 2140 by Yamilet Schulz RN  Outcome: Progressing  7/5/2024 2136 by Yamilet Schulz RN  Outcome: Progressing  Goal: Lab values WNL  7/5/2024 2140 by Yamilet Schulz RN  Outcome: Progressing  7/5/2024 2136 by Yamilet Schulz RN  Outcome: Progressing  Goal: Electrolytes WNL  7/5/2024 2140 by Yamilet Schulz RN  Outcome: Progressing  7/5/2024 2136 by Yamilet Schulz RN  Outcome: Progressing  Goal: Promote healing  7/5/2024 2140 by Yamilet Schulz RN  Outcome: Progressing  7/5/2024 2136 by Yamilet Schulz RN  Outcome: Progressing  Goal: Maintain stable weight  7/5/2024 2140 by Yamilet Schulz RN  Outcome: Progressing  7/5/2024 2136 by Yamilet Schulz RN  Outcome: Progressing  Goal: Reduce weight from edema/fluid  7/5/2024 2140 by Yamilet Schulz RN  Outcome: Progressing  7/5/2024 2136 by Yamilet Schulz RN  Outcome: Progressing  Goal: Gradual weight gain  7/5/2024 2140 by Yamilet Schulz RN  Outcome: Progressing  7/5/2024 2136 by Yamilet Schulz RN  Outcome: Progressing  Goal: Improve ostomy output  7/5/2024 2140 by Yamilet Schulz RN  Outcome: Progressing  7/5/2024 2136 by Yamilet Schulz RN  Outcome: Progressing     Problem: Pain - Adult  Goal: Verbalizes/displays adequate comfort level or baseline comfort level  7/5/2024 2140 by Yamilet Schulz RN  Outcome: Progressing  7/5/2024 2136 by Yamilet Schulz RN  Outcome: Progressing     Problem: Safety - Adult  Goal: Free from fall injury  7/5/2024 2140 by Yamilet Schulz RN  Outcome: Progressing  7/5/2024 2136 by Yamilet Schulz RN  Outcome: Progressing     Problem: Discharge Planning  Goal: Discharge to home or other facility with appropriate resources  7/5/2024 2140 by Yamilet Schulz RN  Outcome: Progressing  7/5/2024 2136 by Yamilet Schulz,  RN  Outcome: Progressing     Problem: Chronic Conditions and Co-morbidities  Goal: Patient's chronic conditions and co-morbidity symptoms are monitored and maintained or improved  7/5/2024 2140 by Yamilet Schulz RN  Outcome: Progressing  7/5/2024 2136 by Yamilet Schulz RN  Outcome: Progressing   The patient's goals for the shift include      The clinical goals for the shift include use call light for any needs

## 2024-07-06 NOTE — PROGRESS NOTES
Pt reviewed in care rounds this morning. Pt not medically ready for discharge. ADOD is 48 hours. SW met w/ Pt at bedside. SW explained the role of care transitions and completed initial assessment. Pt requested SW discuss possible SNF rehab placement w/ Pt's son. SW spoke to Pt's son who is going to consider SNF rehab placement at discharge prior to Pt moving in to [facility] w/ Pt's wife. SW to follow up with Pt's son tomorrow after PT visit and son will consider next steps and make a decision tomorrow 7/7. Discharge plan is TBD home w/ HHC vs. SNF. SW to follow.      07/06/24 0716   Discharge Planning   Living Arrangements Spouse/significant other   Support Systems Spouse/significant other;Children   Assistance Needed yes   Type of Residence Private residence   Number of Stairs to Enter Residence 3   Number of Stairs Within Residence 0   Do you have animals or pets at home? No   Who is requesting discharge planning? Provider   Home or Post Acute Services Other (Comment)  (correction transition in process)   Patient expects to be discharged to: TBD SNF vs Home   Does the patient need discharge transport arranged? No   Financial Resource Strain   How hard is it for you to pay for the very basics like food, housing, medical care, and heating? Not very   Housing Stability   In the last 12 months, was there a time when you were not able to pay the mortgage or rent on time? N   In the last 12 months, how many places have you lived? 1   In the last 12 months, was there a time when you did not have a steady place to sleep or slept in a shelter (including now)? N   Transportation Needs   In the past 12 months, has lack of transportation kept you from medical appointments or from getting medications? no   In the past 12 months, has lack of transportation kept you from meetings, work, or from getting things needed for daily living? No

## 2024-07-06 NOTE — CARE PLAN
Problem: Skin  Goal: Decreased wound size/increased tissue granulation at next dressing change  7/5/2024 2140 by Yamilet Schulz RN  Outcome: Progressing  7/5/2024 2136 by Yamilet Schulz RN  Outcome: Progressing  Goal: Participates in plan/prevention/treatment measures  7/5/2024 2140 by Yamilet Schulz RN  Outcome: Progressing  7/5/2024 2136 by Yamilet Schulz RN  Outcome: Progressing  Goal: Prevent/manage excess moisture  7/5/2024 2141 by Yamilet Schulz RN  Flowsheets (Taken 7/5/2024 2141)  Prevent/manage excess moisture: Cleanse incontinence/protect with barrier cream  7/5/2024 2140 by Yamilet Schulz RN  Outcome: Progressing  7/5/2024 2136 by Yamilet Scuhlz RN  Outcome: Progressing  Goal: Prevent/minimize sheer/friction injuries  7/5/2024 2140 by Yamilet Schulz RN  Outcome: Progressing  7/5/2024 2136 by Yamilet Schulz RN  Outcome: Progressing  Goal: Promote/optimize nutrition  7/5/2024 2140 by Yamilet Schulz RN  Outcome: Progressing  7/5/2024 2136 by Yamilet Schulz RN  Outcome: Progressing  Goal: Promote skin healing  7/5/2024 2140 by Yamilet Schulz RN  Outcome: Progressing  7/5/2024 2136 by Yamilet Schulz RN  Outcome: Progressing     Problem: Nutrition  Goal: Less than 5 days NPO/clear liquids  7/5/2024 2140 by Yamilet Schulz RN  Outcome: Progressing  7/5/2024 2136 by Yamilet Schulz RN  Outcome: Progressing  Goal: Oral intake greater than 50%  7/5/2024 2140 by Yamilet Schulz RN  Outcome: Progressing  7/5/2024 2136 by Yamilet Schulz RN  Outcome: Progressing  Goal: Oral intake greater 75%  7/5/2024 2140 by Yamilet Schulz RN  Outcome: Progressing  7/5/2024 2136 by Yamilet Schulz RN  Outcome: Progressing  Goal: Consume prescribed supplement  7/5/2024 2140 by Yamilet Schulz RN  Outcome: Progressing  7/5/2024 2136 by Yamilet Schulz RN  Outcome: Progressing  Goal: Adequate PO fluid intake  7/5/2024 2140 by Dumfries Zeus, RN  Outcome: Progressing  7/5/2024 2136 by Yamilet Schulz, RN  Outcome: Progressing  Goal: Nutrition support goals are met within 48 hrs  7/5/2024  2140 by Yamilet Schulz RN  Outcome: Progressing  7/5/2024 2136 by Yamilet Schulz RN  Outcome: Progressing  Goal: Nutrition support is meeting 75% of nutrient needs  7/5/2024 2140 by Yamilet Schulz RN  Outcome: Progressing  7/5/2024 2136 by Yamilet Schulz RN  Outcome: Progressing  Goal: Tube feed tolerance  7/5/2024 2140 by Yamilet Schulz RN  Outcome: Progressing  7/5/2024 2136 by Yamilet Schulz RN  Outcome: Progressing  Goal: BG  mg/dL  7/5/2024 2140 by Yamilet Schulz RN  Outcome: Progressing  7/5/2024 2136 by Yamilet Schulz RN  Outcome: Progressing  Goal: Lab values WNL  7/5/2024 2140 by Yamilet Schulz RN  Outcome: Progressing  7/5/2024 2136 by Yamilet Schulz RN  Outcome: Progressing  Goal: Electrolytes WNL  7/5/2024 2140 by Yamilet Schulz RN  Outcome: Progressing  7/5/2024 2136 by Yamilet Schulz RN  Outcome: Progressing  Goal: Promote healing  7/5/2024 2140 by Yamilet Schulz RN  Outcome: Progressing  7/5/2024 2136 by Yamilet Schulz RN  Outcome: Progressing  Goal: Maintain stable weight  7/5/2024 2140 by Yamilet Schulz RN  Outcome: Progressing  7/5/2024 2136 by Yamilet Schulz RN  Outcome: Progressing  Goal: Reduce weight from edema/fluid  7/5/2024 2140 by Yamilet Schulz RN  Outcome: Progressing  7/5/2024 2136 by Yamilet Schulz RN  Outcome: Progressing  Goal: Gradual weight gain  7/5/2024 2140 by Yamilet Schulz RN  Outcome: Progressing  7/5/2024 2136 by Yamilet Schulz RN  Outcome: Progressing  Goal: Improve ostomy output  7/5/2024 2140 by Yamilet Schulz RN  Outcome: Progressing  7/5/2024 2136 by Yamilet Schulz RN  Outcome: Progressing     Problem: Pain - Adult  Goal: Verbalizes/displays adequate comfort level or baseline comfort level  7/5/2024 2140 by Yamilet Schulz RN  Outcome: Progressing  7/5/2024 2136 by Yamilet Schulz RN  Outcome: Progressing     Problem: Safety - Adult  Goal: Free from fall injury  7/5/2024 2140 by Yamilet Schulz RN  Outcome: Progressing  7/5/2024 2136 by Yamilet Schulz RN  Outcome: Progressing     Problem: Discharge Planning  Goal:  Discharge to home or other facility with appropriate resources  7/5/2024 2140 by Yamilet Schulz RN  Outcome: Progressing  7/5/2024 2136 by Yamilet Schulz RN  Outcome: Progressing     Problem: Chronic Conditions and Co-morbidities  Goal: Patient's chronic conditions and co-morbidity symptoms are monitored and maintained or improved  7/5/2024 2140 by Yamilet Schulz RN  Outcome: Progressing  7/5/2024 2136 by Yamilet Schulz RN  Outcome: Progressing   The patient's goals for the shift include      The clinical goals for the shift include use call light for any needs

## 2024-07-06 NOTE — PROGRESS NOTES
Tommy Richmond is a 89 y.o. male on day 0 of admission presenting with Altered mental status, unspecified.      Subjective   Patient sitting up in chair and complains of tenderness around her waist.  He is alert and oriented x 3 but is vague in answering questions.       Objective     Last Recorded Vitals  /73 (BP Location: Left arm, Patient Position: Sitting)   Pulse 75   Temp 36.1 °C (97 °F) (Temporal)   Resp 18   Wt 63.6 kg (140 lb 3.4 oz)   SpO2 95%   Intake/Output last 3 Shifts:    Intake/Output Summary (Last 24 hours) at 7/6/2024 1203  Last data filed at 7/6/2024 1000  Gross per 24 hour   Intake 1289 ml   Output --   Net 1289 ml       Admission Weight  Weight: 65.8 kg (145 lb) (07/05/24 1132)    Daily Weight  07/05/24 : 63.6 kg (140 lb 3.4 oz)    Image Results  XR shoulder left 2+ views  Narrative: Interpreted By:  Tong Roth,   STUDY:  XR SHOULDER LEFT 2+ VIEWS; ;  7/5/2024 5:27 pm      INDICATION:  Signs/Symptoms:s/p falls.      COMPARISON:  None.      ACCESSION NUMBER(S):  [id number]      ORDERING CLINICIAN:  TESHA ALCALA      FINDINGS:  There are mild degenerative changes of the left acromioclavicular  joint. There is irregularity and sclerosis of the greater tuberosity  compatible with rotator cuff tendinosis. There is no acute fracture  or dislocation. There narrowing of the subacromial space due to  full-thickness rotator cuff tear. The acromioclavicular joint and  glenohumeral joint are intact.      Impression: Full-thickness left rotator cuff tear of indeterminate chronicity  though there is osseous evidence of rotator cuff tendinosis/chronic  tear at the greater tuberosity. Orthopedic follow-up may be  considered. Mild degenerative changes of the left acromioclavicular  joint.      No acute fracture.      MACRO:  None.      Signed by: Tong Roth 7/5/2024 6:13 PM  Dictation workstation:   [id number]  CT abdomen pelvis wo IV contrast  Narrative: Interpreted By:  Laura  David,   STUDY:  CT ABDOMEN PELVIS WO IV CONTRAST;  7/5/2024 12:00 pm      INDICATION:  Signs/Symptoms:abdominal pain.      COMPARISON:  09/08/2015      ACCESSION NUMBER(S):  [id number]      ORDERING CLINICIAN:  SPENCER MONSIVAIS      TECHNIQUE:  CT of the abdomen and pelvis was performed.  Contiguous axial images  were obtained at 3 mm slice thickness through the abdomen and pelvis.  Coronal and sagittal reconstructions at 3 mm slice thickness were  performed. The patient received no intravenous contrast. Please note  evaluation of the solid organs and vessels is limited in the absence  of intravenous contrast. Given this caveat, the following  observations are made:      FINDINGS:  LOWER CHEST:  Mild bibasilar atelectasis. Coronary atherosclerosis.      ABDOMEN:      LIVER:  Scattered punctate calcified granulomas.      BILE DUCTS:  Not dilated.      GALLBLADDER:  Contracted containing calcified stones.      PANCREAS:  Unremarkable      SPLEEN:  Unremarkable      ADRENAL GLANDS:  Unremarkable      KIDNEYS AND URETERS:  38 mm cyst with thin calcified septation decreased in size from 54  mm. Tiny cortical cyst in the right lower pole also noted. Otherwise  unremarkable kidneys without hydronephrosis.      PELVIS:      BLADDER:  Partially distended.      REPRODUCTIVE ORGANS:  Severe prostatomegaly.      BOWEL:  Mild distal colonic diverticulosis. Right hemicolectomy changes are  present. No findings of bowel obstruction. No definite inflammatory  changes. Unremarkable mesentery.      VESSELS:  No abdominal aortic aneurysm.Severe atherosclerosis.      PERITONEUM AND RETROPERITONEUM:  Trace free fluid along the right paracolic gutter. No free air.  No abdominal or pelvic lymphadenopathy.      BONE AND ABDOMINAL WALL:  Ventral abdominal hernia repair with mesh again noted. There has been  interval development of a loculated complex collection overlying the  mesh measuring 5.7 x 2.7 cm. Mild subcutaneous edema along the  left  lateral wall. Osteopenia. Severe lumbar degenerative changes.  Interval development of severe anterior wedging with slight osseous  retropulsion at L1, probably chronic. Trace anterolisthesis L4-5.  Mild lumbar dextroscoliosis.          Impression: 1.  Nonspecific subcutaneous edema along the left lateral abdominal  wall.  2. Small volume ascites in the right paracolic gutter. Otherwise no  additional acute findings of the abdomen or pelvis identified on  noncontrast exam.  3. Since 2015, development of a chronic appearing fluid collection  overlying ventral abdominal hernia repair mesh.  4. Interval development of severe chronic appearing anterior wedging  at L1.      MACRO:  None.      Signed by: David Sanchez 7/5/2024 12:19 PM  Dictation workstation:   [id number]  XR chest 1 view  Narrative: Interpreted By:  David Sanchez,   STUDY:  XR CHEST 1 VIEW;  7/5/2024 11:52 am      INDICATION:  Signs/Symptoms:AMS.      COMPARISON:  06/29/2024      ACCESSION NUMBER(S):  [id number]      ORDERING CLINICIAN:  SPENCER MONSIVAIS      FINDINGS:  Small lung volumes. The lungs appear clear. No apparent pleural  effusion. Unremarkable cardiomediastinal silhouette. Aortic  atherosclerosis. No pulmonary vascular congestion.      Impression: No active disease in the chest identified.      MACRO:  None      Signed by: David Sanchez 7/5/2024 12:02 PM  Dictation workstation:   [id number]      Physical Exam   General Appearance: AAO x 3 but is vague in answers, not in acute distress.   Skin: skin color pink, warm, and dry; small blisterlike area noted to anterior portion of left lower leg  Eyes : PERRL, EOM's intact  ENT: mucous membranes pink and moist  Neck: normocephalic  Respiratory: lungs clear to auscultation anteriorly; no wheezing, rhonchi, or crackles.   Heart: regular rate and rhythm. telemetry shows sinus julián  Abdomen: Nondistended, positive bowel sounds x4, soft,  nontender with palpation  Extremities: 1+ edema noted  to left arm  Peripheral pulses: normal x4 extremities  Neuro: alert, coherent and conversant, no focal motor deficits    Relevant Results  Results for orders placed or performed during the hospital encounter of 07/05/24 (from the past 24 hour(s))   Urinalysis with Reflex Culture and Microscopic   Result Value Ref Range    Color, Urine Yellow Light-Yellow, Yellow, Dark-Yellow    Appearance, Urine Clear Clear    Specific Gravity, Urine 1.017 1.005 - 1.035    pH, Urine 6.0 5.0, 5.5, 6.0, 6.5, 7.0, 7.5, 8.0    Protein, Urine NEGATIVE NEGATIVE, 10 (TRACE), 20 (TRACE) mg/dL    Glucose, Urine Normal Normal mg/dL    Blood, Urine NEGATIVE NEGATIVE    Ketones, Urine TRACE (A) NEGATIVE mg/dL    Bilirubin, Urine NEGATIVE NEGATIVE    Urobilinogen, Urine Normal Normal mg/dL    Nitrite, Urine NEGATIVE NEGATIVE    Leukocyte Esterase, Urine 250 Darius/µL (A) NEGATIVE   Extra Urine Gray Tube   Result Value Ref Range    Extra Tube Hold for add-ons.    Microscopic Only, Urine   Result Value Ref Range    WBC, Urine 6-10 (A) 1-5, NONE /HPF    RBC, Urine 1-2 NONE, 1-2, 3-5 /HPF   Basic metabolic panel   Result Value Ref Range    Glucose 83 74 - 99 mg/dL    Sodium 136 136 - 145 mmol/L    Potassium 3.7 3.5 - 5.3 mmol/L    Chloride 106 98 - 107 mmol/L    Bicarbonate 21 21 - 32 mmol/L    Anion Gap 13 10 - 20 mmol/L    Urea Nitrogen 40 (H) 6 - 23 mg/dL    Creatinine 1.26 0.50 - 1.30 mg/dL    eGFR 55 (L) >60 mL/min/1.73m*2    Calcium 8.2 (L) 8.6 - 10.3 mg/dL   CBC   Result Value Ref Range    WBC 6.0 4.4 - 11.3 x10*3/uL    nRBC 0.0 0.0 - 0.0 /100 WBCs    RBC 4.21 (L) 4.50 - 5.90 x10*6/uL    Hemoglobin 12.2 (L) 13.5 - 17.5 g/dL    Hematocrit 37.5 (L) 41.0 - 52.0 %    MCV 89 80 - 100 fL    MCH 29.0 26.0 - 34.0 pg    MCHC 32.5 32.0 - 36.0 g/dL    RDW 14.6 (H) 11.5 - 14.5 %    Platelets 214 150 - 450 x10*3/uL     Scheduled medications  apixaban, 5 mg, oral, BID  cefTRIAXone, 1 g, intravenous, q24h  finasteride, 5 mg, oral, Daily  latanoprost, 1 drop,  Both Eyes, Nightly  losartan 100 mg, hydroCHLOROthiazide 25 mg for Hyzaar 100/25, , oral, Daily  metoprolol tartrate, 25 mg, oral, Daily  oxybutynin, 5 mg, oral, BID  pneumoc 20-mando conj-dip cr(PF), 0.5 mL, intramuscular, During hospitalization  polyethylene glycol, 17 g, oral, Daily  tamsulosin, 0.4 mg, oral, Nightly      Continuous medications     PRN medications  PRN medications: acetaminophen, ondansetron ODT **OR** ondansetron    Assessment/Plan      Principal Problem:    Altered mental status, unspecified    Tommy Richmond is a 89 y.o. 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  UA positive for leukocyte Estrace.  Chest x-ray shows no active disease.  CT abdomen pelvis without contrast performed decrease in assist to kidney, severe prostatomegaly, mild distal colonic diverticulosis, and nonspecific subcutaneous edema along the left lateral abdominal wall.   Patient admitted to telemetry with a working diagnosis of altered mental status, confusion, and UTI.     Plan:  Altered mental status/confusion  -Patient currently alert and oriented  -Patient states he has been having periods of confusion and hallucinations over the last 6 months  -would benefit from outpatient work up for evaluation of dementia     UTI  -Patient received treatment for recent UTI that was positive for Proteus Mirabella's  -Continue Rocephin day 2  -denies dysuria     Weakness/frequent falls  -PT OT consulted-[facility] score 16  -xray of left shoulder completed and shows full thickness left rotator cuff tear with evidence of tendinosis/chronic tear  -needs follow-up with ortho     Left arm edema  -Patient had a left arm duplex completed on 6/24 that was negative for DVT.  Patient had x-ray to  left elbow and humerus that were negative for fracture  -Continue to monitor  -Pharmacy spoke with patient's son regarding Eliquis.  Son was instructed by PCP to continue Eliquis for another 2 weeks even though left arm ultrasound was negative for DVT     BPH  -Continue Flomax and oxybutynin     Hypertension  -Continue Hyzaar and metoprolol     DVT prophylaxis: Continue Eliquis.      Discharge disposition: Anticipate patient to return home with [facility] vs rehab when medically stable.  He assists caring for his wife with Alzheimer's.  He states family is looking intoassisted living's.    Agata Cohen, APRN-CNP

## 2024-07-06 NOTE — CARE PLAN
Problem: Skin  Goal: Decreased wound size/increased tissue granulation at next dressing change  Outcome: Progressing  Goal: Participates in plan/prevention/treatment measures  Outcome: Progressing  Goal: Prevent/manage excess moisture  Outcome: Progressing  Goal: Prevent/minimize sheer/friction injuries  Outcome: Progressing  Goal: Promote/optimize nutrition  Outcome: Progressing  Goal: Promote skin healing  Outcome: Progressing     Problem: Nutrition  Goal: Less than 5 days NPO/clear liquids  Outcome: Progressing  Goal: Oral intake greater than 50%  Outcome: Progressing  Goal: Oral intake greater 75%  Outcome: Progressing  Goal: Consume prescribed supplement  Outcome: Progressing  Goal: Adequate PO fluid intake  Outcome: Progressing  Goal: Nutrition support goals are met within 48 hrs  Outcome: Progressing  Goal: Nutrition support is meeting 75% of nutrient needs  Outcome: Progressing  Goal: Tube feed tolerance  Outcome: Progressing  Goal: BG  mg/dL  Outcome: Progressing  Goal: Lab values WNL  Outcome: Progressing  Goal: Electrolytes WNL  Outcome: Progressing  Goal: Promote healing  Outcome: Progressing  Goal: Maintain stable weight  Outcome: Progressing  Goal: Reduce weight from edema/fluid  Outcome: Progressing  Goal: Gradual weight gain  Outcome: Progressing  Goal: Improve ostomy output  Outcome: Progressing     Problem: Pain - Adult  Goal: Verbalizes/displays adequate comfort level or baseline comfort level  Outcome: Progressing     Problem: Safety - Adult  Goal: Free from fall injury  Outcome: Progressing     Problem: Discharge Planning  Goal: Discharge to home or other facility with appropriate resources  Outcome: Progressing     Problem: Chronic Conditions and Co-morbidities  Goal: Patient's chronic conditions and co-morbidity symptoms are monitored and maintained or improved  Outcome: Progressing   The patient's goals for the shift include      The clinical goals for the shift include use call light  for any needs

## 2024-07-07 VITALS
BODY MASS INDEX: 22.53 KG/M2 | OXYGEN SATURATION: 96 % | RESPIRATION RATE: 18 BRPM | DIASTOLIC BLOOD PRESSURE: 71 MMHG | HEART RATE: 78 BPM | SYSTOLIC BLOOD PRESSURE: 128 MMHG | HEIGHT: 66 IN | TEMPERATURE: 98.4 F | WEIGHT: 140.21 LBS

## 2024-07-07 LAB
ANION GAP SERPL CALC-SCNC: 9 MMOL/L (ref 10–20)
BACTERIA UR CULT: NO GROWTH
BUN SERPL-MCNC: 29 MG/DL (ref 6–23)
CALCIUM SERPL-MCNC: 8 MG/DL (ref 8.6–10.3)
CHLORIDE SERPL-SCNC: 108 MMOL/L (ref 98–107)
CO2 SERPL-SCNC: 25 MMOL/L (ref 21–32)
CREAT SERPL-MCNC: 1.13 MG/DL (ref 0.5–1.3)
EGFRCR SERPLBLD CKD-EPI 2021: 63 ML/MIN/1.73M*2
ERYTHROCYTE [DISTWIDTH] IN BLOOD BY AUTOMATED COUNT: 14.6 % (ref 11.5–14.5)
GLUCOSE SERPL-MCNC: 89 MG/DL (ref 74–99)
HCT VFR BLD AUTO: 37.3 % (ref 41–52)
HGB BLD-MCNC: 12.1 G/DL (ref 13.5–17.5)
MAGNESIUM SERPL-MCNC: 1.84 MG/DL (ref 1.6–2.4)
MCH RBC QN AUTO: 28.7 PG (ref 26–34)
MCHC RBC AUTO-ENTMCNC: 32.4 G/DL (ref 32–36)
MCV RBC AUTO: 89 FL (ref 80–100)
NRBC BLD-RTO: 0 /100 WBCS (ref 0–0)
PLATELET # BLD AUTO: 218 X10*3/UL (ref 150–450)
POTASSIUM SERPL-SCNC: 3.6 MMOL/L (ref 3.5–5.3)
RBC # BLD AUTO: 4.21 X10*6/UL (ref 4.5–5.9)
SODIUM SERPL-SCNC: 138 MMOL/L (ref 136–145)
WBC # BLD AUTO: 5.6 X10*3/UL (ref 4.4–11.3)

## 2024-07-07 PROCEDURE — 80048 BASIC METABOLIC PNL TOTAL CA: CPT

## 2024-07-07 PROCEDURE — 2500000002 HC RX 250 W HCPCS SELF ADMINISTERED DRUGS (ALT 637 FOR MEDICARE OP, ALT 636 FOR OP/ED)

## 2024-07-07 PROCEDURE — 36415 COLL VENOUS BLD VENIPUNCTURE: CPT

## 2024-07-07 PROCEDURE — 83735 ASSAY OF MAGNESIUM: CPT

## 2024-07-07 PROCEDURE — 1100000001 HC PRIVATE ROOM DAILY

## 2024-07-07 PROCEDURE — 97110 THERAPEUTIC EXERCISES: CPT | Mod: GP | Performed by: PHYSICAL THERAPIST

## 2024-07-07 PROCEDURE — 85027 COMPLETE CBC AUTOMATED: CPT

## 2024-07-07 PROCEDURE — 2500000001 HC RX 250 WO HCPCS SELF ADMINISTERED DRUGS (ALT 637 FOR MEDICARE OP)

## 2024-07-07 PROCEDURE — 97116 GAIT TRAINING THERAPY: CPT | Mod: GP | Performed by: PHYSICAL THERAPIST

## 2024-07-07 ASSESSMENT — COGNITIVE AND FUNCTIONAL STATUS - GENERAL
STANDING UP FROM CHAIR USING ARMS: A LITTLE
MOBILITY SCORE: 14
WALKING IN HOSPITAL ROOM: A LOT
MOVING FROM LYING ON BACK TO SITTING ON SIDE OF FLAT BED WITH BEDRAILS: A LITTLE
CLIMB 3 TO 5 STEPS WITH RAILING: A LOT
MOVING FROM LYING ON BACK TO SITTING ON SIDE OF FLAT BED WITH BEDRAILS: A LITTLE
STANDING UP FROM CHAIR USING ARMS: A LOT
WALKING IN HOSPITAL ROOM: A LITTLE
TURNING FROM BACK TO SIDE WHILE IN FLAT BAD: A LOT
TURNING FROM BACK TO SIDE WHILE IN FLAT BAD: A LITTLE
CLIMB 3 TO 5 STEPS WITH RAILING: A LOT
MOVING TO AND FROM BED TO CHAIR: A LITTLE
MOBILITY SCORE: 16
MOVING TO AND FROM BED TO CHAIR: A LOT

## 2024-07-07 ASSESSMENT — PAIN - FUNCTIONAL ASSESSMENT: PAIN_FUNCTIONAL_ASSESSMENT: 0-10

## 2024-07-07 ASSESSMENT — PAIN DESCRIPTION - ORIENTATION: ORIENTATION: LEFT

## 2024-07-07 ASSESSMENT — PAIN SCALES - GENERAL
PAINLEVEL_OUTOF10: 6
PAINLEVEL_OUTOF10: 3

## 2024-07-07 ASSESSMENT — PAIN DESCRIPTION - LOCATION: LOCATION: ARM

## 2024-07-07 NOTE — CARE PLAN
Problem: Skin  Goal: Decreased wound size/increased tissue granulation at next dressing change  Outcome: Progressing  Flowsheets (Taken 7/7/2024 0744)  Decreased wound size/increased tissue granulation at next dressing change: Promote sleep for wound healing  Goal: Participates in plan/prevention/treatment measures  Outcome: Progressing  Flowsheets (Taken 7/7/2024 0744)  Participates in plan/prevention/treatment measures: Increase activity/out of bed for meals  Goal: Prevent/manage excess moisture  Outcome: Progressing  Flowsheets (Taken 7/7/2024 0744)  Prevent/manage excess moisture: Moisturize dry skin  Goal: Prevent/minimize sheer/friction injuries  Outcome: Progressing  Flowsheets (Taken 7/7/2024 0744)  Prevent/minimize sheer/friction injuries: Use pull sheet  Goal: Promote/optimize nutrition  Outcome: Progressing  Flowsheets (Taken 7/7/2024 0744)  Promote/optimize nutrition: Consume > 50% meals/supplements  Goal: Promote skin healing  Outcome: Progressing  Flowsheets (Taken 7/7/2024 0744)  Promote skin healing: Turn/reposition every 2 hours/use positioning/transfer devices     Problem: Nutrition  Goal: Less than 5 days NPO/clear liquids  Outcome: Progressing  Goal: Oral intake greater than 50%  Outcome: Progressing  Goal: Oral intake greater 75%  Outcome: Progressing  Goal: Consume prescribed supplement  Outcome: Progressing  Goal: Adequate PO fluid intake  Outcome: Progressing  Goal: Nutrition support goals are met within 48 hrs  Outcome: Progressing  Goal: Nutrition support is meeting 75% of nutrient needs  Outcome: Progressing  Goal: Tube feed tolerance  Outcome: Progressing  Goal: BG  mg/dL  Outcome: Progressing  Goal: Lab values WNL  Outcome: Progressing  Goal: Electrolytes WNL  Outcome: Progressing  Goal: Promote healing  Outcome: Progressing  Goal: Maintain stable weight  Outcome: Progressing  Goal: Reduce weight from edema/fluid  Outcome: Progressing  Goal: Gradual weight gain  Outcome:  Progressing  Goal: Improve ostomy output  Outcome: Progressing     Problem: Pain - Adult  Goal: Verbalizes/displays adequate comfort level or baseline comfort level  Outcome: Progressing     Problem: Safety - Adult  Goal: Free from fall injury  Outcome: Progressing     Problem: Discharge Planning  Goal: Discharge to home or other facility with appropriate resources  Outcome: Progressing     Problem: Chronic Conditions and Co-morbidities  Goal: Patient's chronic conditions and co-morbidity symptoms are monitored and maintained or improved  Outcome: Progressing   The patient's goals for the shift include use the call light    The clinical goals for the shift include no falls    Over the shift, the patient did not make progress toward the following goals. Barriers to progression include . Recommendations to address these barriers include .

## 2024-07-07 NOTE — PROGRESS NOTES
Physical Therapy    Physical Therapy Treatment    Patient Name: Tommy Richmond  MRN: [id number]  Today's Date: 7/7/2024  Time Calculation  Start Time: 0851  Stop Time: 0916  Time Calculation (min): 25 min    Assessment/Plan   PT Assessment  PT Assessment Results: Decreased strength, Impaired balance, Decreased endurance, Decreased mobility, Pain  Rehab Prognosis: Fair  Barriers to Discharge: None  Barriers to Participation:  (None)  End of Session Communication: PCT/NA/CTA  Assessment Comment: Good form with seated ex's performed today with minimal cueing needed. Pt was unable to walk as far today due to pt feeling unsteady and weak/fatigued walking after completing seated and standing ex's today.  Pt needs close assist/supervision with all gait and standing activity for best safety.  End of Session Patient Position: Up in chair, Alarm on  PT Plan  Inpatient/Swing Bed or Outpatient: Inpatient  PT Plan  Treatment/Interventions: Bed mobility, Transfer training, Gait training, Balance training, Stair training, Therapeutic exercise, Therapeutic activity, Home exercise program  PT Plan: Ongoing PT  PT Frequency: 5 times per week  PT Discharge Recommendations: Moderate intensity level of continued care      General Visit Information:   PT  Visit  PT Received On: 07/07/24  General  Reason for Referral: Dx:  AMS, UTI  Referred By: Agata Cohen CNP  Past Medical History Relevant to Rehab: PMH:  CKD-3, Macular Degeneration to R, Colon CA, BPH, HTN  Prior to Session Communication: PCT/NA/CTA  Patient Position Received: Up in chair, Alarm on    Subjective   Pt reports that he is agreeable to PT treatment today.  Pt reports that his L shoulder is stiff and sore.     Precautions:  Precautions  Precautions Comment: Fall precautions      Objective     Pain:  Pain Assessment  Pain Assessment: 0-10  0-10 (Numeric) Pain Score: 6  Pain Type: Chronic pain  Pain Location: Shoulder  Pain Orientation:  Left    Cognition:  Cognition  Overall Cognitive Status: Impaired  Orientation Level: Disoriented to time, Disoriented to situation (Pt is somewhat confused, but can also answer most questions appropriately and follow commands)    Postural Control:  Static Sitting Balance  Static Sitting-Level of Assistance: Distant supervision  Static Standing Balance  Static Standing-Level of Assistance: Contact guard (with FWW)    Activity Tolerance:  Activity Tolerance  Endurance:  (Fair endurance with standing activity.  Pt was very fatigued with gait today and could only walk approx 10 ft out and back to chair today.)    Treatments:  Therapeutic Exercise  Therapeutic Exercise Performed: Yes (Pt performed 10 reps of various ther ex including:  Seated marches, seated resistive hip abd and hip add squeezing pillows, seated heel to toe raises, LAQ, Standing Heel raises and Standing mini squats with use of FWW for support with standing ex's.)  Therapeutic Exercise Activity 1: Pt also was given 3-5 min of PROM  to L shoulder in seated position due to reports of L shoulder pain and stiffness/tightness.    Outcome Measures:  [facility] Basic Mobility  Turning from your back to your side while in a flat bed without using bedrails: A little  Moving from lying on your back to sitting on the side of a flat bed without using bedrails: A lot  Moving to and from bed to chair (including a wheelchair): A little  Standing up from a chair using your arms (e.g. wheelchair or bedside chair): A little  To walk in hospital room: A little  Climbing 3-5 steps with railing: A lot  Basic Mobility - Total Score: 16    Education Documentation  Precautions, taught by Earl Grey, PT at 7/7/2024 10:00 AM.  Learner: Patient  Readiness: Acceptance  Method: Explanation, Demonstration  Response: Verbalizes Understanding  Comment: PT edu pt on safety cues with tranfsers.  Pt verbalizes understanding but  needs further education and cueing for safety with  tranfsers.    Home Exercise Program, taught by Earl Grey, PT at 2024 10:00 AM.  Learner: Patient  Readiness: Acceptance  Method: Explanation, Demonstration  Response: Verbalizes Understanding  Comment: PT edu pt on safety cues with tranfsers.  Pt verbalizes understanding but  needs further education and cueing for safety with tranfsers.    Mobility Training, taught by Earl Grey, PT at 2024 10:00 AM.  Learner: Patient  Readiness: Acceptance  Method: Explanation, Demonstration  Response: Verbalizes Understanding  Comment: PT edu pt on safety cues with tranfsers.  Pt verbalizes understanding but  needs further education and cueing for safety with tranfsers.    Education Comments  No comments found.        Encounter Problems       Encounter Problems (Active)       PT Problem       PT Goal 1       Start:  24    Expected End:  24       ST)  Pt will be  Mod I with use of FWW  with Sit to stand and bed to chair tranfers with good safety in order to facilitate safe functional mobility.    2)  Pt will be  Mod I  and safe with bed mobility, supine to sit and sit to supine transfers safely inorder to facilitate safe functional mobility.    3)  Pt will be able to amb >= 100  ft  SBA-Mod I with FWW   safely in order to facilitate safe mobility.      4)   Pt will improve b/l LE strength to >= 4+/5 throughout in order to facilitate safe gait and mobility.      5)  Pt will be able to negotiate 3-4 steps CGA-SBA with use of HR safely in order to enter/exit the house if returning home at [place].    6)  Pt will be I with HEP with use of handouts as needed in order to maximize strength and safety with mobility.                    Pain - Adult

## 2024-07-07 NOTE — PROGRESS NOTES
"Tommy Richmond is a 89 y.o. male on day 1 of admission presenting with Altered mental status, unspecified.      Subjective   Patient resting quietly in bed.  States he feels groggy today and complaining of stiff neck.  \"I know where I am at but cannot think of the name of the building.\"  Patient is more confused today and and states that he is  but that his mother suffers from dementia.  Time spent in conversation with patient knows the age of his mother but is not able to provide information.  When questioned if his mother was still alive he believes so but is unsure and verbalizes that he is confusing his mother with his wife       Objective     Last Recorded Vitals  /82 (BP Location: Right arm, Patient Position: Lying)   Pulse 77   Temp 36.3 °C (97.3 °F) (Temporal)   Resp 18   Wt 63.6 kg (140 lb 3.4 oz)   SpO2 98%   Intake/Output last 3 Shifts:    Intake/Output Summary (Last 24 hours) at 7/7/2024 1006  Last data filed at 7/7/2024 0500  Gross per 24 hour   Intake 770 ml   Output 1700 ml   Net -930 ml       Admission Weight  Weight: 65.8 kg (145 lb) (07/05/24 1132)    Daily Weight  07/05/24 : 63.6 kg (140 lb 3.4 oz)    Image Results  ECG 12 lead  Sinus rhythm with occasional Premature ventricular complexes  Low voltage QRS  Incomplete right bundle branch block  Cannot rule out Anterior infarct , age undetermined  ST & T wave abnormality, consider inferior ischemia  Abnormal ECG  No previous ECGs available  See ED provider note for full interpretation and clinical correlation  Confirmed by Lucía Paz ([id number]) on 7/6/2024 12:43:42 PM      Physical Exam  General Appearance: Alert and unsure of location. Increase in confusion. vague in answers, not in acute distress.   Skin: skin color pink, warm, and dry; small blisterlike area noted to anterior portion of left lower leg has ruptured  Eyes : PERRL, EOM's intact  ENT: mucous membranes pink and moist  Neck: normocephalic  Respiratory: lungs " clear to auscultation anteriorly; no wheezing, rhonchi, or crackles.   Heart: regular rate and rhythm. telemetry shows sinus rhythm with PAC  Abdomen: Nondistended, positive bowel sounds x4, soft,  nontender with palpation  Extremities: 1+ edema noted to left arm  Peripheral pulses: normal x4 extremities  Neuro: alert, coherent and conversant, no focal motor deficits    Relevant Results  Results for orders placed or performed during the hospital encounter of 07/05/24 (from the past 24 hour(s))   CBC   Result Value Ref Range    WBC 5.6 4.4 - 11.3 x10*3/uL    nRBC 0.0 0.0 - 0.0 /100 WBCs    RBC 4.21 (L) 4.50 - 5.90 x10*6/uL    Hemoglobin 12.1 (L) 13.5 - 17.5 g/dL    Hematocrit 37.3 (L) 41.0 - 52.0 %    MCV 89 80 - 100 fL    MCH 28.7 26.0 - 34.0 pg    MCHC 32.4 32.0 - 36.0 g/dL    RDW 14.6 (H) 11.5 - 14.5 %    Platelets 218 150 - 450 x10*3/uL   Basic Metabolic Panel   Result Value Ref Range    Glucose 89 74 - 99 mg/dL    Sodium 138 136 - 145 mmol/L    Potassium 3.6 3.5 - 5.3 mmol/L    Chloride 108 (H) 98 - 107 mmol/L    Bicarbonate 25 21 - 32 mmol/L    Anion Gap 9 (L) 10 - 20 mmol/L    Urea Nitrogen 29 (H) 6 - 23 mg/dL    Creatinine 1.13 0.50 - 1.30 mg/dL    eGFR 63 >60 mL/min/1.73m*2    Calcium 8.0 (L) 8.6 - 10.3 mg/dL     Scheduled medications  apixaban, 5 mg, oral, BID  finasteride, 5 mg, oral, Daily  latanoprost, 1 drop, Both Eyes, Nightly  losartan 100 mg, hydroCHLOROthiazide 25 mg for Hyzaar 100/25, , oral, Daily  metoprolol tartrate, 25 mg, oral, Daily  oxybutynin, 5 mg, oral, BID  pneumoc 20-mando conj-dip cr(PF), 0.5 mL, intramuscular, During hospitalization  polyethylene glycol, 17 g, oral, Daily  tamsulosin, 0.4 mg, oral, Nightly      Continuous medications     PRN medications  PRN medications: acetaminophen, ondansetron ODT **OR** ondansetron       Assessment/Plan      Principal Problem:    Altered mental status, unspecified  Active Problems:    Altered mental status, unspecified altered mental status  type    Tommy Richmond is a 89 y.o. 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 ED from home By family for altered mentation and confusion.  UA positive for leukocyte Estrace.  Chest x-ray shows no active disease.  CT abdomen pelvis without contrast performed decrease in assist to kidney, severe prostatomegaly, mild distal colonic diverticulosis, and nonspecific subcutaneous edema along the left lateral abdominal wall.   Patient admitted to telemetry with a working diagnosis of altered mental status, confusion, and UTI.     Plan:  Altered mental status/confusion  -Increasing confusion this a.m. unable to verbalize his location and is mixing up conversation regarding his wife and mother who suffers from dementia.  Appears to become more confused when attempted clarification if mother is still living  -Patient states he has been having periods of confusion and hallucinations over the last 6 months  -would benefit from outpatient work up for evaluation of dementia     UTI  -Patient received treatment for recent UTI that was positive for Proteus Mirabella's  -Urine culture returned showing no growth.  Rocephin DC'd  -denies dysuria     Weakness/frequent falls  -PT OT consulted-[facility] score 16  -xray of left shoulder completed and shows full thickness left rotator cuff tear with evidence of tendinosis/chronic tear  -needs follow-up with ortho  -Patient agreeable for rehab     Left arm edema  -Patient had a left arm duplex completed on 6/24 that was negative for DVT.  Patient had x-ray to left elbow and humerus that were negative for fracture  -Continue to monitor  -Pharmacy spoke with patient's son regarding Eliquis.  Son was instructed by PCP to continue Eliquis for another 2 weeks even though left arm ultrasound was negative for  DVT     BPH  -Continue Flomax and oxybutynin     Hypertension  -Continue Hyzaar and metoprolol     DVT prophylaxis: Continue Eliquis.        Discharge disposition: Anticipate patient to return home with [facility] vs rehab when medically stable.  He assists caring for his wife with Alzheimer's.  Plan is to discharge to rehab.  Awaiting  pre-CERT.  Patient is medically stable for discharge to rehab when pre-CERT obtained    Agata Cohen, GUILLERMO-CNP

## 2024-07-07 NOTE — PROGRESS NOTES
SW made phone call to Pt's son/Yury to check in on disposition and discharge plan. Per Pt's son/Yury Pt and Pt's wife are scheduled to move in to [facility] on Wednesday 7/10. Pt's son/Yury stated they would like orders for  Home Care and based on Pt's ampac and other social factors Pt's son/Pt/family think it's best if Pt goes home for a few days before moving to [facility] and Pt's son will be at Pt's home most of the time during the few days between discharge from hospital and transition to [facility]. Plan is for Pt to discharge home w/ [facility] when medically ready. SW to follow.

## 2024-07-08 ENCOUNTER — DOCUMENTATION (OUTPATIENT)
Dept: HOME HEALTH SERVICES | Facility: HOME HEALTH | Age: 89
End: 2024-07-08
Payer: MEDICARE

## 2024-07-08 ENCOUNTER — HOME HEALTH ADMISSION (OUTPATIENT)
Dept: HOME HEALTH SERVICES | Facility: HOME HEALTH | Age: 89
End: 2024-07-08
Payer: MEDICARE

## 2024-07-08 VITALS
WEIGHT: 140.21 LBS | TEMPERATURE: 98.1 F | HEART RATE: 77 BPM | BODY MASS INDEX: 22.53 KG/M2 | SYSTOLIC BLOOD PRESSURE: 167 MMHG | DIASTOLIC BLOOD PRESSURE: 85 MMHG | RESPIRATION RATE: 18 BRPM | HEIGHT: 66 IN | OXYGEN SATURATION: 95 %

## 2024-07-08 PROBLEM — R41.82 ALTERED MENTAL STATUS, UNSPECIFIED ALTERED MENTAL STATUS TYPE: Status: ACTIVE | Noted: 2024-07-08

## 2024-07-08 PROBLEM — R41.82 ALTERED MENTAL STATUS, UNSPECIFIED ALTERED MENTAL STATUS TYPE: Status: RESOLVED | Noted: 2024-07-06 | Resolved: 2024-07-08

## 2024-07-08 LAB
ANION GAP SERPL CALC-SCNC: 9 MMOL/L (ref 10–20)
BUN SERPL-MCNC: 30 MG/DL (ref 6–23)
CALCIUM SERPL-MCNC: 8.2 MG/DL (ref 8.6–10.3)
CHLORIDE SERPL-SCNC: 107 MMOL/L (ref 98–107)
CO2 SERPL-SCNC: 26 MMOL/L (ref 21–32)
CREAT SERPL-MCNC: 1.08 MG/DL (ref 0.5–1.3)
EGFRCR SERPLBLD CKD-EPI 2021: 66 ML/MIN/1.73M*2
ERYTHROCYTE [DISTWIDTH] IN BLOOD BY AUTOMATED COUNT: 14.8 % (ref 11.5–14.5)
GLUCOSE SERPL-MCNC: 84 MG/DL (ref 74–99)
HCT VFR BLD AUTO: 36.4 % (ref 41–52)
HGB BLD-MCNC: 11.8 G/DL (ref 13.5–17.5)
MCH RBC QN AUTO: 29 PG (ref 26–34)
MCHC RBC AUTO-ENTMCNC: 32.4 G/DL (ref 32–36)
MCV RBC AUTO: 89 FL (ref 80–100)
NRBC BLD-RTO: 0 /100 WBCS (ref 0–0)
PLATELET # BLD AUTO: 208 X10*3/UL (ref 150–450)
POTASSIUM SERPL-SCNC: 3.8 MMOL/L (ref 3.5–5.3)
RBC # BLD AUTO: 4.07 X10*6/UL (ref 4.5–5.9)
SODIUM SERPL-SCNC: 138 MMOL/L (ref 136–145)
WBC # BLD AUTO: 5.9 X10*3/UL (ref 4.4–11.3)

## 2024-07-08 PROCEDURE — 85027 COMPLETE CBC AUTOMATED: CPT

## 2024-07-08 PROCEDURE — 82607 VITAMIN B-12: CPT | Performed by: STUDENT IN AN ORGANIZED HEALTH CARE EDUCATION/TRAINING PROGRAM

## 2024-07-08 PROCEDURE — 2500000002 HC RX 250 W HCPCS SELF ADMINISTERED DRUGS (ALT 637 FOR MEDICARE OP, ALT 636 FOR OP/ED)

## 2024-07-08 PROCEDURE — 80048 BASIC METABOLIC PNL TOTAL CA: CPT

## 2024-07-08 PROCEDURE — G0378 HOSPITAL OBSERVATION PER HR: HCPCS

## 2024-07-08 PROCEDURE — 36415 COLL VENOUS BLD VENIPUNCTURE: CPT

## 2024-07-08 PROCEDURE — 2500000001 HC RX 250 WO HCPCS SELF ADMINISTERED DRUGS (ALT 637 FOR MEDICARE OP)

## 2024-07-08 ASSESSMENT — COGNITIVE AND FUNCTIONAL STATUS - GENERAL
PERSONAL GROOMING: A LITTLE
MOVING TO AND FROM BED TO CHAIR: A LOT
STANDING UP FROM CHAIR USING ARMS: A LOT
MOVING FROM LYING ON BACK TO SITTING ON SIDE OF FLAT BED WITH BEDRAILS: A LITTLE
DRESSING REGULAR UPPER BODY CLOTHING: A LITTLE
DAILY ACTIVITIY SCORE: 17
MOBILITY SCORE: 14
TURNING FROM BACK TO SIDE WHILE IN FLAT BAD: A LITTLE
HELP NEEDED FOR BATHING: A LOT
CLIMB 3 TO 5 STEPS WITH RAILING: A LOT
DRESSING REGULAR LOWER BODY CLOTHING: A LOT
TOILETING: A LITTLE
WALKING IN HOSPITAL ROOM: A LOT

## 2024-07-08 ASSESSMENT — PAIN - FUNCTIONAL ASSESSMENT: PAIN_FUNCTIONAL_ASSESSMENT: 0-10

## 2024-07-08 ASSESSMENT — PAIN SCALES - GENERAL: PAINLEVEL_OUTOF10: 0 - NO PAIN

## 2024-07-08 NOTE — DISCHARGE SUMMARY
Discharge Diagnosis  Altered mental status, unspecified    Issues Requiring Follow-Up  Left arm swelling no DVT still anticoagulated  UTI  Generalized debility  BPH  CKD 3    Discharge Meds     Your medication list        CONTINUE taking these medications        Instructions Last Dose Given Next Dose Due   alendronate 70 mg tablet  Commonly known as: Fosamax      Take 1 tablet (70 mg) by mouth every 7 days. Take in the morning with a full glass of water, on an empty stomach, and do not take anything else by mouth or lie down for the next 30 min.       ammonium lactate 12 % lotion  Commonly known as: Lac-Hydrin           cefuroxime 250 mg tablet  Commonly known as: Ceftin      Take 1 tablet (250 mg) by mouth 2 times a day for 10 days.       Eliquis 5 mg tablet  Generic drug: apixaban      Take 1 tablet (5 mg) by mouth 2 times a day.       finasteride 5 mg tablet  Commonly known as: Proscar      TAKE 1 TABLET EVERY DAY       HEALTHY EYES ORAL           hydrOXYzine HCL 25 mg tablet  Commonly known as: Atarax      Take 1 tablet (25 mg) by mouth every 8 hours if needed for anxiety.       latanoprost 0.005 % ophthalmic solution  Commonly known as: Xalatan           losartan-hydrochlorothiazide 100-25 mg tablet  Commonly known as: Hyzaar      Take 1 tablet by mouth once daily.       metoprolol tartrate 25 mg tablet  Commonly known as: Lopressor      TAKE 1 TABLET EVERY DAY       oxybutynin XL 10 mg 24 hr tablet  Commonly known as: Ditropan-XL      Take 1 tablet (10 mg) by mouth once daily.       potassium chloride CR 20 mEq ER tablet  Commonly known as: Klor-Con M20      TAKE 1 TABLET EVERY DAY       PRESERVISION AREDS ORAL           SENTRY SENIOR ORAL           tamsulosin 0.4 mg 24 hr capsule  Commonly known as: Flomax      TAKE 1 CAPSULE AT BEDTIME              STOP taking these medications      cephalexin 500 mg capsule  Commonly known as: Keflex        UNABLE TO FIND               ASK your doctor about these  medications        Instructions Last Dose Given Next Dose Due   busPIRone 5 mg tablet  Commonly known as: Buspar      Take 1 tablet (5 mg) by mouth 2 times a day.                Test Results Pending At Discharge  Pending Labs       No current pending labs.            Hospital Course   Tommy Richmond is a 89 y.o. 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 UA positive for leukocyte Estrace. Chest x-ray shows no active disease. CT abdomen pelvis without contrast performed decrease in assist to kidney, severe prostatomegaly, mild distal colonic diverticulosis, and nonspecific subcutaneous edema along the left lateral abdominal wall. Patient admitted to telemetry with a working diagnosis of altered mental status, confusion, and UTI.   Patient received treatment for recent UTI that was positive for Proteus Mirabella.  Urine culture showed no growth.  Rocephin started in the ED was discontinued.  Patient's white blood cell count is 5.9.  He has no fever or chills.  Patient has left arm edema.  Duplex on 5/6/2024 was negative for DVT.  X-ray of the arm was negative for fracture.  According to the patient's son the PCP advised this patient to continue the Eliquis for another 2 weeks then DC.  The patient was admitted for altered mental status and confusion.  According to the patient's son he has had confusion and some hallucinations for over 6 months.  Patient currently was awake oriented to self, unsure of year, knew he was in the hospital but not name of it.   Patient scored a 16 with therapy.  His left shoulder showed a full-thickness rotator cuff tear.  Will need to follow-up with orthopedics for this.    According to social work the patient will be moving to [place] on 7/10 with his wife.  The patient's son Yury requested the patient come home for few days with home health care before moving to [facility].  The patient will be discharged home today with home health care in stable condition.      Pertinent Physical Exam At Time of Discharge  Physical Exam  General Appearance: AAO x 2   Skin: skin color pink, warm, and dry; small blisterlike area noted to anterior portion of left lower leg  Eyes : PERRL, EOM's intact  ENT: mucous membranes pink and moist  Neck: normocephalic  Respiratory: lungs clear to auscultation anteriorly; no wheezing, rhonchi, or crackles.   Heart: regular rate and rhythm. telemetry shows sinus julián  Abdomen: Nondistended, positive bowel sounds x4, soft,  nontender with palpation  Extremities: 1+ edema noted to left arm  Peripheral pulses: normal x4 extremities  Neuro: alert, coherent and conversant, no focal motor deficit  Outpatient Follow-Up  Future Appointments   Date Time Provider Department Center   7/9/2024 10:00 AM Brandan Mcclendon MD MPH [id number] [facility]   7/24/2024  2:45 PM UROLOGY [id number] PROCEDURE ROOM [id number] [facility]         GUILLERMO Vail-CNP

## 2024-07-08 NOTE — HH CARE COORDINATION
Home Care received a Referral for Nursing, Physical Therapy, Occupational Therapy, and Home Health Aide. We have processed the referral for a Start of Care on 07/09/2024.     If you have any questions or concerns, please feel free to contact us at [phone]. Follow the prompts, enter your five digit zip code, and you will be directed to your care team on WEST 1.

## 2024-07-08 NOTE — DISCHARGE INSTRUCTIONS
"Urinary tract infection - Discharge instructions    The Basics  Written by the doctors and editors at [facility]  What are discharge instructions? -- Discharge instructions are information about how to take care of yourself after getting medical care for a health problem.  What is a urinary tract infection? -- A urinary tract infection (\"UTI\") is an infection that affects either the bladder or the kidneys (figure 1). A kidney infection is more serious, and can lead to other serious problems if it is not treated properly.  You need to take antibiotics to treat a UTI. It is important to take all of your antibiotics, even if you start to feel better.  How do I care for myself at home? -- Ask the doctor or nurse what you should do when you go home. Make sure that you understand exactly what you need to do to care for yourself. Ask questions if there is anything you do not understand.  You should also:  ? Take all of your medicines as instructed.  ? Take phenazopyridine (sample brand name: AZO Urinary Pain Relief) for the first day or so, if you choose. This is an over-the-counter medicine. It will help numb your bladder and decrease the urge to urinate. This medicine causes your urine and tears to look orange.  ? Take acetaminophen (sample brand name: Tylenol) if needed for pain.  ? Drink extra fluids. This can help prevent more bladder infections. If you have sex, these things might also help:  ? Urinate right afterward.  ? If you use birth control, use a form that does not contain spermicide.  When should I call the doctor? -- Call for advice if:  ? You have pain in your back, shoulder, or belly.  ? You have a fever, shaking chills, or sweats even though you are taking antibiotics.  ? You notice more blood in your urine.  ? Your symptoms get worse or do not get better within 24 hours of starting antibiotics.  ? Your symptoms come back after finishing treatment.  ? You have any new or worrying symptoms.  All topics are " updated as new evidence becomes available and our peer review process is complete.  This topic retrieved from [employer] on: May 30, 2024.  Topic 854375 Version 1.0  Release: 32.5.3 - C32.150  © 2024 UpToDate, Inc. and/or its affiliates. All rights reserved.  figure 1: Anatomy of the urinary tract

## 2024-07-08 NOTE — CARE PLAN
Problem: Skin  Goal: Decreased wound size/increased tissue granulation at next dressing change  Outcome: Progressing  Goal: Participates in plan/prevention/treatment measures  Outcome: Progressing  Goal: Prevent/manage excess moisture  Outcome: Progressing  Goal: Prevent/minimize sheer/friction injuries  Outcome: Progressing  Goal: Promote/optimize nutrition  Outcome: Progressing  Goal: Promote skin healing  Outcome: Progressing     Problem: Nutrition  Goal: Less than 5 days NPO/clear liquids  Outcome: Progressing  Goal: Oral intake greater than 50%  Outcome: Progressing  Goal: Oral intake greater 75%  Outcome: Progressing  Goal: Consume prescribed supplement  Outcome: Progressing  Goal: Adequate PO fluid intake  Outcome: Progressing  Goal: Nutrition support goals are met within 48 hrs  Outcome: Progressing  Goal: Nutrition support is meeting 75% of nutrient needs  Outcome: Progressing  Goal: Tube feed tolerance  Outcome: Progressing  Goal: BG  mg/dL  Outcome: Progressing  Goal: Lab values WNL  Outcome: Progressing  Goal: Electrolytes WNL  Outcome: Progressing  Goal: Promote healing  Outcome: Progressing  Goal: Maintain stable weight  Outcome: Progressing  Goal: Reduce weight from edema/fluid  Outcome: Progressing  Goal: Gradual weight gain  Outcome: Progressing  Goal: Improve ostomy output  Outcome: Progressing     Problem: Pain - Adult  Goal: Verbalizes/displays adequate comfort level or baseline comfort level  Outcome: Progressing     Problem: Safety - Adult  Goal: Free from fall injury  Outcome: Progressing   The patient's goals for the shift include use the call light    The clinical goals for the shift include no falls

## 2024-07-09 ENCOUNTER — APPOINTMENT (OUTPATIENT)
Dept: PRIMARY CARE | Facility: CLINIC | Age: 89
End: 2024-07-09
Payer: MEDICARE

## 2024-07-09 ENCOUNTER — PATIENT OUTREACH (OUTPATIENT)
Dept: PRIMARY CARE | Facility: CLINIC | Age: 89
End: 2024-07-09

## 2024-07-09 NOTE — PROGRESS NOTES
Discharge Facility: [facility]  Discharge Diagnosis: AMS  Admission Date: 7/5/2024  Discharge Date: 7/8/2024    PCP Appointment Date: 7/11/224 @1000    Specialist Appointment Date:   -urology 7/24/2024  -Follow up with [facility] Encounter and Summary Linked: Yes    See discharge assessment below for further details    Issues Requiring Follow-Up  Left arm swelling no DVT still anticoagulated  UTI  Generalized debility  BPH  CKD 3    Engagement  Call Start Time: 0940 (7/9/2024  9:40 AM)    Medications  Medications reviewed with patient/caregiver?: Yes (7/9/2024  9:40 AM)  Is the patient having any side effects they believe may be caused by any medication additions or changes?: No (7/9/2024  9:40 AM)  Does the patient have all medications ordered at discharge?: Yes (7/9/2024  9:40 AM)  Care Management Interventions: No intervention needed (7/9/2024  9:40 AM)  Prescription Comments: stop: geftin and keflex (7/9/2024  9:40 AM)  Is the patient taking all medications as directed (includes completed medication regime)?: Yes (7/9/2024  9:40 AM)  Medication Comments: no new meds- no issues with obtaining meds (7/9/2024  9:40 AM)    Appointments  Does the patient have a primary care provider?: Yes (7/9/2024  9:40 AM)  Care Management Interventions: Verified appointment date/time/provider (7/9/2024  9:40 AM)  Has the patient kept scheduled appointments due by today?: Yes (7/9/2024  9:40 AM)  Care Management Interventions: Advised patient to keep appointment (7/9/2024  9:40 AM)    Self Management  What is the home health agency?:  home care (7/9/2024  9:40 AM)  Has home health visited the patient within 72 hours of discharge?: Call prior to 72 hours (7/9/2024  9:40 AM)    Patient Teaching  Does the patient have access to their discharge instructions?: Yes (7/9/2024  9:40 AM)  Care Management Interventions: Reviewed instructions with patient (7/9/2024  9:40 AM)  What is the patient's perception of their  health status since discharge?: Improving (7/9/2024  9:40 AM)  Is the patient/caregiver able to teach back the hierarchy of who to call/visit for symptoms/problems? PCP, Specialist, Home Health nurse, Urgent Care, ED, 911: Yes (7/9/2024  9:40 AM)    Wrap Up  Wrap Up Additional Comments: CTS spoke with patient son. Patient was admitted to [facility] on 7/5/2024 with AMS. Discharged on 7/8/2024 with no new meds. Home care with [facility]. Son stated that the patient was doing better since being home. Denies any confusion or mental changes. The family is working on getting him and his wife into an jail. Reviewed medications. Has understanding of discharge instructions. Home health start of care is either today 7/9/2024 or tomorrow 7/10/2024. Patient is scheduled for a follow up with PCP and they are aware of the date and time of appt. No questions or concerns at this time. Patients son has my contact info if any needs arise. (7/9/2024  9:40 AM)  Call End Time: 0946 (7/9/2024  9:40 AM)

## 2024-07-10 ENCOUNTER — HOME CARE VISIT (OUTPATIENT)
Dept: HOME HEALTH SERVICES | Facility: HOME HEALTH | Age: 89
End: 2024-07-10
Payer: MEDICARE

## 2024-07-10 VITALS
DIASTOLIC BLOOD PRESSURE: 52 MMHG | OXYGEN SATURATION: 94 % | TEMPERATURE: 97.9 F | SYSTOLIC BLOOD PRESSURE: 118 MMHG | HEART RATE: 75 BPM | RESPIRATION RATE: 16 BRPM

## 2024-07-10 PROCEDURE — G0299 HHS/HOSPICE OF RN EA 15 MIN: HCPCS | Mod: HHH

## 2024-07-10 PROCEDURE — 169592 NO-PAY CLAIM PROCEDURE

## 2024-07-10 SDOH — ECONOMIC STABILITY: FOOD INSECURITY: MEALS PER DAY: 3

## 2024-07-10 ASSESSMENT — ACTIVITIES OF DAILY LIVING (ADL)
DRESSING_LB_CURRENT_FUNCTION: MINIMUM ASSIST
GROOMING ASSESSED: 1
TRANSPORTATION ASSESSED: 1
BATHING ASSESSED: 1
SHOPPING: DEPENDENT
LAUNDRY: DEPENDENT
TOILETING: 1
FEEDING: INDEPENDENT
CURRENT_FUNCTION: SUPERVISION
ORAL_CARE_CURRENT_FUNCTION: INDEPENDENT
PHYSICAL TRANSFERS ASSESSED: 1
ORAL_CARE_ASSESSED: 1
AMBULATION ASSISTANCE: STAND BY ASSIST
LAUNDRY ASSESSED: 1
OASIS_M1830: 03
TRANSPORTATION: DEPENDENT
LIGHT HOUSEKEEPING: DEPENDENT
BATHING_CURRENT_FUNCTION: ONE PERSON
ENTERING_EXITING_HOME: CONTACT GUARD ASSIST
HOUSEKEEPING ASSESSED: 1
TOILETING: SUPERVISION
FEEDING ASSESSED: 1
GROOMING_CURRENT_FUNCTION: INDEPENDENT
PREPARING MEALS: DEPENDENT
USING THE TELPHONE: INDEPENDENT
AMBULATION ASSISTANCE: 1
TELEPHONE USE ASSESSED: 1
DRESSING_UB_CURRENT_FUNCTION: MINIMUM ASSIST
SHOPPING ASSESSED: 1

## 2024-07-10 ASSESSMENT — ENCOUNTER SYMPTOMS
PAIN LOCATION: LEFT SHOULDER
HYPERTENSION: 1
LOSS OF SENSATION IN FEET: 0
PAIN: 1
LOWEST PAIN SEVERITY IN PAST 24 HOURS: 1/10
PAIN SEVERITY GOAL: 0/10
DEPRESSION: 0
PAIN LOCATION - PAIN QUALITY: ACHES
MUSCLE WEAKNESS: 1
APPETITE LEVEL: GOOD
PERSON REPORTING PAIN: PATIENT
PAIN LOCATION - PAIN SEVERITY: 4/10
SUBJECTIVE PAIN PROGRESSION: WAXING AND WANING
PAIN LOCATION - PAIN FREQUENCY: INTERMITTENT
HIGHEST PAIN SEVERITY IN PAST 24 HOURS: 4/10
OCCASIONAL FEELINGS OF UNSTEADINESS: 1
CHANGE IN APPETITE: UNCHANGED

## 2024-07-10 ASSESSMENT — PAIN SCALES - PAIN ASSESSMENT IN ADVANCED DEMENTIA (PAINAD)
BODYLANGUAGE: 0
FACIALEXPRESSION: 0 - SMILING OR INEXPRESSIVE.
BODYLANGUAGE: 0 - RELAXED.
NEGVOCALIZATION: 0 - NONE.
BREATHING: 0
TOTALSCORE: 0
NEGVOCALIZATION: 0
CONSOLABILITY: 0 - NO NEED TO CONSOLE.
CONSOLABILITY: 0
FACIALEXPRESSION: 0

## 2024-07-10 ASSESSMENT — LIFESTYLE VARIABLES: SMOKING_STATUS: 0

## 2024-07-11 ENCOUNTER — HOME CARE VISIT (OUTPATIENT)
Dept: HOME HEALTH SERVICES | Facility: HOME HEALTH | Age: 89
End: 2024-07-11
Payer: MEDICARE

## 2024-07-11 ENCOUNTER — OFFICE VISIT (OUTPATIENT)
Dept: PRIMARY CARE | Facility: CLINIC | Age: 89
End: 2024-07-11
Payer: MEDICARE

## 2024-07-11 VITALS
WEIGHT: 143.2 LBS | HEIGHT: 66 IN | OXYGEN SATURATION: 98 % | HEART RATE: 68 BPM | BODY MASS INDEX: 23.01 KG/M2 | SYSTOLIC BLOOD PRESSURE: 98 MMHG | DIASTOLIC BLOOD PRESSURE: 60 MMHG

## 2024-07-11 DIAGNOSIS — M79.89 LEFT UPPER EXTREMITY SWELLING: Primary | ICD-10-CM

## 2024-07-11 LAB — VIT B12 SERPL-MCNC: 236 PG/ML (ref 211–911)

## 2024-07-11 PROCEDURE — 1111F DSCHRG MED/CURRENT MED MERGE: CPT | Performed by: STUDENT IN AN ORGANIZED HEALTH CARE EDUCATION/TRAINING PROGRAM

## 2024-07-11 PROCEDURE — 99213 OFFICE O/P EST LOW 20 MIN: CPT | Performed by: STUDENT IN AN ORGANIZED HEALTH CARE EDUCATION/TRAINING PROGRAM

## 2024-07-11 PROCEDURE — 1036F TOBACCO NON-USER: CPT | Performed by: STUDENT IN AN ORGANIZED HEALTH CARE EDUCATION/TRAINING PROGRAM

## 2024-07-11 PROCEDURE — 3078F DIAST BP <80 MM HG: CPT | Performed by: STUDENT IN AN ORGANIZED HEALTH CARE EDUCATION/TRAINING PROGRAM

## 2024-07-11 PROCEDURE — 1159F MED LIST DOCD IN RCRD: CPT | Performed by: STUDENT IN AN ORGANIZED HEALTH CARE EDUCATION/TRAINING PROGRAM

## 2024-07-11 PROCEDURE — 1160F RVW MEDS BY RX/DR IN RCRD: CPT | Performed by: STUDENT IN AN ORGANIZED HEALTH CARE EDUCATION/TRAINING PROGRAM

## 2024-07-11 PROCEDURE — 3074F SYST BP LT 130 MM HG: CPT | Performed by: STUDENT IN AN ORGANIZED HEALTH CARE EDUCATION/TRAINING PROGRAM

## 2024-07-11 ASSESSMENT — PATIENT HEALTH QUESTIONNAIRE - PHQ9
1. LITTLE INTEREST OR PLEASURE IN DOING THINGS: NOT AT ALL
2. FEELING DOWN, DEPRESSED OR HOPELESS: NOT AT ALL
SUM OF ALL RESPONSES TO PHQ9 QUESTIONS 1 AND 2: 0

## 2024-07-11 NOTE — PROGRESS NOTES
Subjective   Patient ID: Tommy Richmond is a 89 y.o. male who presents for Follow-up.    HPI    Pt is here today for 2 week FUV. PT is no longer [employer], he and his wife are going to be going to [facility], son reports he is going to get form needed from them and will fill out their portion filled out and will drop them off to us.    Tingling  and numbness in the tips of the fingers bilateral. Has been a long term concern.     Review of Systems  ROS negative except discussed above in HPI.    Vitals:    07/11/24 1011   BP: 98/60   Pulse: 68   SpO2: 98%     Objective   Physical Exam  Constitutional:       Appearance: Normal appearance.   Musculoskeletal:         General: Swelling (left foreram swelling noticed. no erythema noticed. non-tender.) present.   Neurological:      Mental Status: He is alert.           Assessment/Plan   Tommy was seen today for follow-up.  Diagnoses and all orders for this visit:  Left upper extremity swelling (Primary)  -     Vascular US upper extremity venous duplex left; Future      Follow up in 4 weeks.         Brandan Mcclendon MD MPH

## 2024-07-11 NOTE — HOME HEALTH
SOC complete. VSS. Family (son and DIL) very supportive. Pt A/O x3, no evidence of confusion or dementia. Pts wife has dementia. Pt and wife receive meals on wheels. Ambulating with walker.

## 2024-07-12 ENCOUNTER — HOME CARE VISIT (OUTPATIENT)
Dept: HOME HEALTH SERVICES | Facility: HOME HEALTH | Age: 89
End: 2024-07-12
Payer: MEDICARE

## 2024-07-12 PROCEDURE — G0152 HHCP-SERV OF OT,EA 15 MIN: HCPCS | Mod: HHH

## 2024-07-12 ASSESSMENT — ENCOUNTER SYMPTOMS
PAIN: 1
PAIN SEVERITY GOAL: 0/10
SUBJECTIVE PAIN PROGRESSION: GRADUALLY IMPROVING
PAIN LOCATION - PAIN QUALITY: ACHE
PAIN LOCATION: LEFT SHOULDER
LOWEST PAIN SEVERITY IN PAST 24 HOURS: 2/10
HIGHEST PAIN SEVERITY IN PAST 24 HOURS: 6/10
PAIN LOCATION - RELIEVING FACTORS: MEDS REST
PAIN LOCATION - PAIN SEVERITY: 2/10
PERSON REPORTING PAIN: PATIENT

## 2024-07-12 ASSESSMENT — ACTIVITIES OF DAILY LIVING (ADL)
BATHING ASSESSED: 1
BATHING_CURRENT_FUNCTION: MODERATE ASSIST
DRESSING_LB_CURRENT_FUNCTION: MODERATE ASSIST

## 2024-07-13 LAB
ATRIAL RATE: 65 BPM
P AXIS: 29 DEGREES
P OFFSET: 187 MS
P ONSET: 133 MS
PR INTERVAL: 180 MS
Q ONSET: 223 MS
QRS COUNT: 11 BEATS
QRS DURATION: 132 MS
QT INTERVAL: 414 MS
QTC CALCULATION(BAZETT): 430 MS
QTC FREDERICIA: 425 MS
R AXIS: -38 DEGREES
T AXIS: -13 DEGREES
T OFFSET: 430 MS
VENTRICULAR RATE: 65 BPM

## 2024-07-14 ENCOUNTER — HOME CARE VISIT (OUTPATIENT)
Dept: HOME HEALTH SERVICES | Facility: HOME HEALTH | Age: 89
End: 2024-07-14
Payer: MEDICARE

## 2024-07-15 ENCOUNTER — HOSPITAL ENCOUNTER (OUTPATIENT)
Dept: VASCULAR MEDICINE | Facility: HOSPITAL | Age: 89
Discharge: HOME | End: 2024-07-15
Payer: MEDICARE

## 2024-07-15 ENCOUNTER — TELEPHONE (OUTPATIENT)
Dept: PRIMARY CARE | Facility: CLINIC | Age: 89
End: 2024-07-15
Payer: MEDICARE

## 2024-07-15 DIAGNOSIS — M79.89 LEFT UPPER EXTREMITY SWELLING: ICD-10-CM

## 2024-07-15 PROCEDURE — 93971 EXTREMITY STUDY: CPT

## 2024-07-15 PROCEDURE — 93971 EXTREMITY STUDY: CPT | Performed by: STUDENT IN AN ORGANIZED HEALTH CARE EDUCATION/TRAINING PROGRAM

## 2024-07-16 ENCOUNTER — PATIENT OUTREACH (OUTPATIENT)
Dept: PRIMARY CARE | Facility: CLINIC | Age: 89
End: 2024-07-16
Payer: MEDICARE

## 2024-07-16 ENCOUNTER — HOME CARE VISIT (OUTPATIENT)
Dept: HOME HEALTH SERVICES | Facility: HOME HEALTH | Age: 89
End: 2024-07-16
Payer: MEDICARE

## 2024-07-16 VITALS — DIASTOLIC BLOOD PRESSURE: 75 MMHG | HEART RATE: 60 BPM | SYSTOLIC BLOOD PRESSURE: 115 MMHG

## 2024-07-16 PROCEDURE — G0151 HHCP-SERV OF PT,EA 15 MIN: HCPCS | Mod: HHH

## 2024-07-16 SDOH — HEALTH STABILITY: PHYSICAL HEALTH: EXERCISE TYPE: SEATED

## 2024-07-16 ASSESSMENT — ENCOUNTER SYMPTOMS
PAIN LOCATION - RELIEVING FACTORS: REST
HIGHEST PAIN SEVERITY IN PAST 24 HOURS: 6/10
DEPRESSION: 0
PAIN LOCATION - PAIN SEVERITY: 6/10
PERSON REPORTING PAIN: PATIENT
PAIN LOCATION - PAIN DURATION: DAILY
PAIN: 1
OCCASIONAL FEELINGS OF UNSTEADINESS: 1
PAIN SEVERITY GOAL: 0/10
LOWEST PAIN SEVERITY IN PAST 24 HOURS: 0/10
PAIN LOCATION - PAIN FREQUENCY: CONSTANT
LOSS OF SENSATION IN FEET: 0
SUBJECTIVE PAIN PROGRESSION: UNCHANGED
PAIN LOCATION: LEFT SHOULDER
PAIN LOCATION - EXACERBATING FACTORS: MOVEMENT
MUSCLE WEAKNESS: 1
PAIN LOCATION - PAIN QUALITY: ACHY

## 2024-07-16 ASSESSMENT — PAIN SCALES - PAIN ASSESSMENT IN ADVANCED DEMENTIA (PAINAD)
FACIALEXPRESSION: 0 - SMILING OR INEXPRESSIVE.
BODYLANGUAGE: 0
BREATHING: 0
FACIALEXPRESSION: 0
BODYLANGUAGE: 0 - RELAXED.
CONSOLABILITY: 0 - NO NEED TO CONSOLE.
NEGVOCALIZATION: 0
CONSOLABILITY: 0
NEGVOCALIZATION: 0 - NONE.
TOTALSCORE: 0

## 2024-07-16 ASSESSMENT — ACTIVITIES OF DAILY LIVING (ADL)
BATHING_CURRENT_FUNCTION: MINIMUM ASSIST
AMBULATION ASSISTANCE ON FLAT SURFACES: 1
SHOPPING: DEPENDENT
CURRENT_FUNCTION: MINIMUM ASSIST
AMBULATION ASSISTANCE: ONE PERSON
BATHING ASSESSED: 1
AMBULATION_DISTANCE/DURATION_TOLERATED: 3 MINUTES
LAUNDRY: DEPENDENT
LAUNDRY ASSESSED: 1
AMBULATION ASSISTANCE: 1
HOUSEKEEPING ASSESSED: 1
CURRENT_FUNCTION: ONE PERSON
SHOPPING ASSESSED: 1
TRANSPORTATION ASSESSED: 1
TRANSPORTATION: DEPENDENT
PHYSICAL TRANSFERS ASSESSED: 1
PREPARING MEALS: DEPENDENT
AMBULATION ASSISTANCE: MINIMUM ASSIST
BATHING_CURRENT_FUNCTION: ONE PERSON
LIGHT HOUSEKEEPING: DEPENDENT

## 2024-07-16 NOTE — PROGRESS NOTES
Call regarding appt. with PCP on 7/11/2024 after hospitalization.  At time of outreach call the patient feels as if their condition has improved since last visit.  Reviewed the PCP appointment with the pt and addressed any questions or concerns.

## 2024-07-17 ENCOUNTER — HOME CARE VISIT (OUTPATIENT)
Dept: HOME HEALTH SERVICES | Facility: HOME HEALTH | Age: 89
End: 2024-07-17
Payer: MEDICARE

## 2024-07-17 PROCEDURE — G0152 HHCP-SERV OF OT,EA 15 MIN: HCPCS | Mod: HHH

## 2024-07-17 ASSESSMENT — ACTIVITIES OF DAILY LIVING (ADL)
BATHING ASSESSED: 1
DRESSING_LB_CURRENT_FUNCTION: MODERATE ASSIST
BATHING_CURRENT_FUNCTION: MODERATE ASSIST

## 2024-07-17 ASSESSMENT — ENCOUNTER SYMPTOMS
PERSON REPORTING PAIN: PATIENT
DENIES PAIN: 1

## 2024-07-18 ENCOUNTER — HOME CARE VISIT (OUTPATIENT)
Dept: HOME HEALTH SERVICES | Facility: HOME HEALTH | Age: 89
End: 2024-07-18
Payer: MEDICARE

## 2024-07-18 ENCOUNTER — TELEPHONE (OUTPATIENT)
Dept: PRIMARY CARE | Facility: CLINIC | Age: 89
End: 2024-07-18
Payer: MEDICARE

## 2024-07-18 VITALS — HEART RATE: 60 BPM | DIASTOLIC BLOOD PRESSURE: 75 MMHG | SYSTOLIC BLOOD PRESSURE: 110 MMHG

## 2024-07-18 VITALS
DIASTOLIC BLOOD PRESSURE: 70 MMHG | RESPIRATION RATE: 18 BRPM | SYSTOLIC BLOOD PRESSURE: 120 MMHG | HEART RATE: 80 BPM | OXYGEN SATURATION: 94 % | TEMPERATURE: 97.1 F

## 2024-07-18 DIAGNOSIS — M79.89 LEFT UPPER EXTREMITY SWELLING: Primary | ICD-10-CM

## 2024-07-18 PROCEDURE — G0151 HHCP-SERV OF PT,EA 15 MIN: HCPCS | Mod: HHH

## 2024-07-18 PROCEDURE — G0300 HHS/HOSPICE OF LPN EA 15 MIN: HCPCS | Mod: HHH

## 2024-07-18 RX ORDER — FUROSEMIDE 20 MG/1
20 TABLET ORAL DAILY
Qty: 15 TABLET | Refills: 0 | Status: SHIPPED | OUTPATIENT
Start: 2024-07-18 | End: 2025-07-18

## 2024-07-18 SDOH — HEALTH STABILITY: PHYSICAL HEALTH: EXERCISE TYPE: SEATED

## 2024-07-18 ASSESSMENT — ENCOUNTER SYMPTOMS
PAIN: 1
PAIN LOCATION - EXACERBATING FACTORS: CONSTANT
HIGHEST PAIN SEVERITY IN PAST 24 HOURS: 4/10
PAIN LOCATION: LEFT LEG
PERSON REPORTING PAIN: PATIENT
PAIN LOCATION - PAIN DURATION: DAILY
LOWEST PAIN SEVERITY IN PAST 24 HOURS: 0/10
PAIN LOCATION - PAIN QUALITY: ACHY
PAIN LOCATION - EXACERBATING FACTORS: CONSTANT
PAIN LOCATION - RELIEVING FACTORS: CONSTANT
PAIN LOCATION - PAIN QUALITY: ACHY
MUSCLE WEAKNESS: 1
PAIN LOCATION - PAIN SEVERITY: 4/10
PAIN SEVERITY GOAL: 0/10
PAIN LOCATION: RIGHT LEG
PAIN LOCATION - RELIEVING FACTORS: CONSTANT
PAIN LOCATION - PAIN FREQUENCY: CONSTANT
PAIN LOCATION - PAIN DURATION: DAILY
PAIN LOCATION - PAIN SEVERITY: 4/10
PAIN LOCATION - PAIN FREQUENCY: CONSTANT

## 2024-07-18 ASSESSMENT — ACTIVITIES OF DAILY LIVING (ADL)
CURRENT_FUNCTION: ONE PERSON
AMBULATION ASSISTANCE: ONE PERSON

## 2024-07-18 ASSESSMENT — PAIN SCALES - PAIN ASSESSMENT IN ADVANCED DEMENTIA (PAINAD)
BREATHING: 0
NEGVOCALIZATION: 0
NEGVOCALIZATION: 0 - NONE.
BODYLANGUAGE: 0
CONSOLABILITY: 0 - NO NEED TO CONSOLE.
FACIALEXPRESSION: 0
FACIALEXPRESSION: 0 - SMILING OR INEXPRESSIVE.
CONSOLABILITY: 0
BODYLANGUAGE: 0 - RELAXED.
TOTALSCORE: 0

## 2024-07-18 NOTE — TELEPHONE ENCOUNTER
Is the swelling persistent or come and go  Has he gained weight in the last week   Is he having trouble breathing at night

## 2024-07-18 NOTE — TELEPHONE ENCOUNTER
They had a home health nurse come out and he is having his legs and hands swelling, the nurse told them to call in. Please call son at [phone]. Thank you.

## 2024-07-18 NOTE — TELEPHONE ENCOUNTER
I spoke to pt son-swelling is  persistent in left arm and left leg, and right hand and lower rt leg   unsure if he has had weight gain , does not c/o breathing issues at night   PT checked vitals today and they are normal she did state that he is more fatigued today fell asleep during PT, has 4 plus edema on legs

## 2024-07-19 ENCOUNTER — APPOINTMENT (OUTPATIENT)
Dept: LAB | Facility: LAB | Age: 89
End: 2024-07-19
Payer: MEDICARE

## 2024-07-19 ENCOUNTER — TELEPHONE (OUTPATIENT)
Dept: PRIMARY CARE | Facility: CLINIC | Age: 89
End: 2024-07-19
Payer: MEDICARE

## 2024-07-19 ENCOUNTER — LAB (OUTPATIENT)
Dept: LAB | Facility: LAB | Age: 89
End: 2024-07-19
Payer: MEDICARE

## 2024-07-19 ENCOUNTER — HOME CARE VISIT (OUTPATIENT)
Dept: HOME HEALTH SERVICES | Facility: HOME HEALTH | Age: 89
End: 2024-07-19
Payer: MEDICARE

## 2024-07-19 ENCOUNTER — TELEPHONE (OUTPATIENT)
Dept: PRIMARY CARE | Facility: CLINIC | Age: 89
End: 2024-07-19

## 2024-07-19 DIAGNOSIS — R60.1 GENERALIZED EDEMA: Primary | ICD-10-CM

## 2024-07-19 DIAGNOSIS — R60.1 GENERALIZED EDEMA: ICD-10-CM

## 2024-07-19 LAB
ALBUMIN SERPL BCP-MCNC: 3.5 G/DL (ref 3.4–5)
ALP SERPL-CCNC: 88 U/L (ref 33–136)
ALT SERPL W P-5'-P-CCNC: 37 U/L (ref 10–52)
ANION GAP SERPL CALC-SCNC: 12 MMOL/L (ref 10–20)
APPEARANCE UR: CLEAR
AST SERPL W P-5'-P-CCNC: 27 U/L (ref 9–39)
BASOPHILS # BLD AUTO: 0.02 X10*3/UL (ref 0–0.1)
BASOPHILS NFR BLD AUTO: 0.3 %
BILIRUB SERPL-MCNC: 1.1 MG/DL (ref 0–1.2)
BILIRUB UR STRIP.AUTO-MCNC: NEGATIVE MG/DL
BUN SERPL-MCNC: 45 MG/DL (ref 6–23)
CALCIUM SERPL-MCNC: 8.5 MG/DL (ref 8.6–10.3)
CHLORIDE SERPL-SCNC: 109 MMOL/L (ref 98–107)
CO2 SERPL-SCNC: 23 MMOL/L (ref 21–32)
COLOR UR: COLORLESS
CREAT SERPL-MCNC: 1.3 MG/DL (ref 0.5–1.3)
CREAT UR-MCNC: 65 MG/DL (ref 20–370)
EGFRCR SERPLBLD CKD-EPI 2021: 53 ML/MIN/1.73M*2
EOSINOPHIL # BLD AUTO: 0.1 X10*3/UL (ref 0–0.4)
EOSINOPHIL NFR BLD AUTO: 1.5 %
ERYTHROCYTE [DISTWIDTH] IN BLOOD BY AUTOMATED COUNT: 14.8 % (ref 11.5–14.5)
GLUCOSE SERPL-MCNC: 82 MG/DL (ref 74–99)
GLUCOSE UR STRIP.AUTO-MCNC: NORMAL MG/DL
HCT VFR BLD AUTO: 37.1 % (ref 41–52)
HGB BLD-MCNC: 11.7 G/DL (ref 13.5–17.5)
HOLD SPECIMEN: NORMAL
HYALINE CASTS #/AREA URNS AUTO: ABNORMAL /LPF
IMM GRANULOCYTES # BLD AUTO: 0.01 X10*3/UL (ref 0–0.5)
IMM GRANULOCYTES NFR BLD AUTO: 0.1 % (ref 0–0.9)
KETONES UR STRIP.AUTO-MCNC: NEGATIVE MG/DL
LEUKOCYTE ESTERASE UR QL STRIP.AUTO: ABNORMAL
LYMPHOCYTES # BLD AUTO: 1.14 X10*3/UL (ref 0.8–3)
LYMPHOCYTES NFR BLD AUTO: 16.8 %
MCH RBC QN AUTO: 28.5 PG (ref 26–34)
MCHC RBC AUTO-ENTMCNC: 31.5 G/DL (ref 32–36)
MCV RBC AUTO: 91 FL (ref 80–100)
MONOCYTES # BLD AUTO: 0.5 X10*3/UL (ref 0.05–0.8)
MONOCYTES NFR BLD AUTO: 7.4 %
MUCOUS THREADS #/AREA URNS AUTO: ABNORMAL /LPF
NEUTROPHILS # BLD AUTO: 5.03 X10*3/UL (ref 1.6–5.5)
NEUTROPHILS NFR BLD AUTO: 73.9 %
NITRITE UR QL STRIP.AUTO: NEGATIVE
NRBC BLD-RTO: 0 /100 WBCS (ref 0–0)
PH UR STRIP.AUTO: 5 [PH]
PLATELET # BLD AUTO: 251 X10*3/UL (ref 150–450)
POTASSIUM SERPL-SCNC: 4.1 MMOL/L (ref 3.5–5.3)
PROT SERPL-MCNC: 6 G/DL (ref 6.4–8.2)
PROT UR STRIP.AUTO-MCNC: NEGATIVE MG/DL
PROT UR-ACNC: 9 MG/DL (ref 5–25)
PROT/CREAT UR: 0.14 MG/MG CREAT (ref 0–0.17)
RBC # BLD AUTO: 4.1 X10*6/UL (ref 4.5–5.9)
RBC # UR STRIP.AUTO: NEGATIVE /UL
RBC #/AREA URNS AUTO: ABNORMAL /HPF
SODIUM SERPL-SCNC: 140 MMOL/L (ref 136–145)
SP GR UR STRIP.AUTO: 1.02
UROBILINOGEN UR STRIP.AUTO-MCNC: NORMAL MG/DL
WBC # BLD AUTO: 6.8 X10*3/UL (ref 4.4–11.3)
WBC #/AREA URNS AUTO: ABNORMAL /HPF

## 2024-07-19 PROCEDURE — 82570 ASSAY OF URINE CREATININE: CPT

## 2024-07-19 PROCEDURE — 81001 URINALYSIS AUTO W/SCOPE: CPT

## 2024-07-19 PROCEDURE — 36415 COLL VENOUS BLD VENIPUNCTURE: CPT

## 2024-07-19 PROCEDURE — 84156 ASSAY OF PROTEIN URINE: CPT

## 2024-07-19 PROCEDURE — 80053 COMPREHEN METABOLIC PANEL: CPT

## 2024-07-19 PROCEDURE — 85025 COMPLETE CBC W/AUTO DIFF WBC: CPT

## 2024-07-19 NOTE — TELEPHONE ENCOUNTER
Patient son called in and stated that patient is showing symptoms for a UTI he is having confusion and he fell the other day and feeling weak, pain on left side of abdomen.. Patient son wondering if we could test him for a UTI.

## 2024-07-19 NOTE — TELEPHONE ENCOUNTER
Son calling regarding results of labs, due to it being the weekend 7 maybe can get some meds on board before then. Please call to discuss

## 2024-07-19 NOTE — TELEPHONE ENCOUNTER
Spoke to son and let him know about orders that were placed. He stated he would take him to the [facility] to get these done. Nothing further needed at this time.

## 2024-07-20 LAB — BACTERIA UR CULT: NO GROWTH

## 2024-07-22 ENCOUNTER — HOME CARE VISIT (OUTPATIENT)
Dept: HOME HEALTH SERVICES | Facility: HOME HEALTH | Age: 89
End: 2024-07-22
Payer: MEDICARE

## 2024-07-22 ENCOUNTER — LAB (OUTPATIENT)
Dept: LAB | Facility: LAB | Age: 89
End: 2024-07-22
Payer: MEDICARE

## 2024-07-22 VITALS
RESPIRATION RATE: 18 BRPM | SYSTOLIC BLOOD PRESSURE: 100 MMHG | DIASTOLIC BLOOD PRESSURE: 60 MMHG | TEMPERATURE: 97.6 F | OXYGEN SATURATION: 96 % | HEART RATE: 77 BPM

## 2024-07-22 DIAGNOSIS — R60.1 GENERALIZED EDEMA: ICD-10-CM

## 2024-07-22 LAB
COLLECT DURATION TIME SPEC: 24 HRS
CREAT 24H UR-MCNC: 57 MG/DL (ref 20–370)
CREAT 24H UR-MRATE: 0.51 G/24 H (ref 0.87–2.41)
PROT 24H UR-MCNC: 8 MG/DL (ref 5–25)
PROT 24H UR-MRATE: 72 MG/24H (ref 0–149)
SPECIMEN VOL 24H UR: 900 ML

## 2024-07-22 PROCEDURE — G0152 HHCP-SERV OF OT,EA 15 MIN: HCPCS | Mod: HHH

## 2024-07-22 PROCEDURE — 82570 ASSAY OF URINE CREATININE: CPT

## 2024-07-22 PROCEDURE — 81050 URINALYSIS VOLUME MEASURE: CPT

## 2024-07-22 PROCEDURE — 84156 ASSAY OF PROTEIN URINE: CPT

## 2024-07-22 PROCEDURE — G0299 HHS/HOSPICE OF RN EA 15 MIN: HCPCS | Mod: HHH

## 2024-07-22 ASSESSMENT — ENCOUNTER SYMPTOMS
DENIES PAIN: 1
STOOL FREQUENCY: DAILY
PAIN LOCATION - PAIN QUALITY: ACHY AND TENDER
HIGHEST PAIN SEVERITY IN PAST 24 HOURS: 2/10
LOWER EXTREMITY EDEMA: 1
LAST BOWEL MOVEMENT: 67043
APPETITE LEVEL: GOOD
BOWEL PATTERN NORMAL: 1
MUSCLE WEAKNESS: 1
PERSON REPORTING PAIN: PATIENT
PAIN: 1
OCCASIONAL FEELINGS OF UNSTEADINESS: 0
CHANGE IN APPETITE: UNCHANGED
APPETITE LEVEL: GOOD
LOWEST PAIN SEVERITY IN PAST 24 HOURS: 0/10
PAIN LOCATION - EXACERBATING FACTORS: PRESSURE
PAIN LOCATION: LEFT HAND
PERSON REPORTING PAIN: PATIENT
PERSON REPORTING PAIN: PATIENT
DENIES PAIN: 1
PAIN LOCATION - PAIN FREQUENCY: INTERMITTENT
PAIN LOCATION - PAIN SEVERITY: 1/10
SUBJECTIVE PAIN PROGRESSION: UNCHANGED
PAIN LOCATION - RELIEVING FACTORS: POSITIONAL
PAIN SEVERITY GOAL: 0/10
CHANGE IN APPETITE: UNCHANGED

## 2024-07-22 ASSESSMENT — PAIN SCALES - PAIN ASSESSMENT IN ADVANCED DEMENTIA (PAINAD)
BODYLANGUAGE: 0 - RELAXED.
FACIALEXPRESSION: 0
NEGVOCALIZATION: 0
TOTALSCORE: 0
FACIALEXPRESSION: 0 - SMILING OR INEXPRESSIVE.
NEGVOCALIZATION: 0 - NONE.
BREATHING: 0
BODYLANGUAGE: 0
CONSOLABILITY: 0 - NO NEED TO CONSOLE.
CONSOLABILITY: 0

## 2024-07-22 ASSESSMENT — ACTIVITIES OF DAILY LIVING (ADL)
HOUSEKEEPING ASSESSED: 1
BATHING_CURRENT_FUNCTION: MODERATE ASSIST
TRANSPORTATION: DEPENDENT
TRANSPORTATION ASSESSED: 1
BATHING ASSESSED: 1
CURRENT_FUNCTION: CONTACT GUARD ASSIST
LIGHT HOUSEKEEPING: DEPENDENT
PREPARING MEALS: DEPENDENT
AMBULATION ASSISTANCE: STAND BY ASSIST
AMBULATION ASSISTANCE: 1
PHYSICAL_TRANSFERS_DEVICES: ROLLATOR
DRESSING_LB_CURRENT_FUNCTION: MODERATE ASSIST
PHYSICAL TRANSFERS ASSESSED: 1

## 2024-07-23 ENCOUNTER — HOME CARE VISIT (OUTPATIENT)
Dept: HOME HEALTH SERVICES | Facility: HOME HEALTH | Age: 89
End: 2024-07-23
Payer: MEDICARE

## 2024-07-23 VITALS
SYSTOLIC BLOOD PRESSURE: 125 MMHG | RESPIRATION RATE: 18 BRPM | DIASTOLIC BLOOD PRESSURE: 65 MMHG | HEART RATE: 56 BPM | OXYGEN SATURATION: 97 %

## 2024-07-23 PROCEDURE — G0151 HHCP-SERV OF PT,EA 15 MIN: HCPCS | Mod: HHH

## 2024-07-23 SDOH — HEALTH STABILITY: PHYSICAL HEALTH: EXERCISE TYPE: SEATED

## 2024-07-23 ASSESSMENT — ENCOUNTER SYMPTOMS
PERSON REPORTING PAIN: PATIENT
DENIES PAIN: 1
MUSCLE WEAKNESS: 1

## 2024-07-23 ASSESSMENT — ACTIVITIES OF DAILY LIVING (ADL)
PHYSICAL TRANSFERS ASSESSED: 1
CURRENT_FUNCTION: ONE PERSON
BATHING ASSESSED: 1
AMBULATION ASSISTANCE: 1
CURRENT_FUNCTION: MINIMUM ASSIST
HOUSEKEEPING ASSESSED: 1
TRANSPORTATION ASSESSED: 1
LAUNDRY: DEPENDENT
SHOPPING ASSESSED: 1
BATHING_CURRENT_FUNCTION: MINIMUM ASSIST
LIGHT HOUSEKEEPING: DEPENDENT
PREPARING MEALS: DEPENDENT
BATHING_CURRENT_FUNCTION: ONE PERSON
SHOPPING: DEPENDENT
TRANSPORTATION: DEPENDENT
AMBULATION_DISTANCE/DURATION_TOLERATED: 60
LAUNDRY ASSESSED: 1
AMBULATION ASSISTANCE: ONE PERSON
AMBULATION ASSISTANCE: MINIMUM ASSIST
AMBULATION ASSISTANCE ON FLAT SURFACES: 1

## 2024-07-23 ASSESSMENT — PAIN SCALES - PAIN ASSESSMENT IN ADVANCED DEMENTIA (PAINAD)
BREATHING: 0
FACIALEXPRESSION: 0
FACIALEXPRESSION: 0 - SMILING OR INEXPRESSIVE.
CONSOLABILITY: 0
BODYLANGUAGE: 0 - RELAXED.
NEGVOCALIZATION: 0 - NONE.
NEGVOCALIZATION: 0
BODYLANGUAGE: 0
CONSOLABILITY: 0 - NO NEED TO CONSOLE.
TOTALSCORE: 0

## 2024-07-23 NOTE — PROGRESS NOTES
Patient ID: Tommy Richmond is a 89 y.o. male.    Procedures  The patient was prepped and draped in the standard fashion.  Lidociane jelly was used on the urethral   Meatus.  A 16 Belarusian red rubber catheter was placed into the bladder.  The bladder was drained.  The bladder was  Then irrigated several times until clear without mucous.  Aprroximately 60ml of a saline and __BETADINE_______________  Solutions was left in the bladder.  The catheter was removed and patient  was asked to hold the solution in the  Bladder as long as possible      FOLLOW UP BETADINE IRRIGATION IN 1 MONTH.

## 2024-07-24 ENCOUNTER — HOME CARE VISIT (OUTPATIENT)
Dept: HOME HEALTH SERVICES | Facility: HOME HEALTH | Age: 89
End: 2024-07-24
Payer: MEDICARE

## 2024-07-24 ENCOUNTER — APPOINTMENT (OUTPATIENT)
Dept: UROLOGY | Facility: CLINIC | Age: 89
End: 2024-07-24
Payer: MEDICARE

## 2024-07-24 VITALS — RESPIRATION RATE: 14 BRPM | SYSTOLIC BLOOD PRESSURE: 110 MMHG | DIASTOLIC BLOOD PRESSURE: 70 MMHG

## 2024-07-24 DIAGNOSIS — N39.0 RECURRENT UTI: ICD-10-CM

## 2024-07-24 PROCEDURE — 3074F SYST BP LT 130 MM HG: CPT | Performed by: UROLOGY

## 2024-07-24 PROCEDURE — 1111F DSCHRG MED/CURRENT MED MERGE: CPT | Performed by: UROLOGY

## 2024-07-24 PROCEDURE — 1159F MED LIST DOCD IN RCRD: CPT | Performed by: UROLOGY

## 2024-07-24 PROCEDURE — 51700 IRRIGATION OF BLADDER: CPT | Performed by: UROLOGY

## 2024-07-24 PROCEDURE — 1036F TOBACCO NON-USER: CPT | Performed by: UROLOGY

## 2024-07-24 PROCEDURE — G0152 HHCP-SERV OF OT,EA 15 MIN: HCPCS | Mod: HHH

## 2024-07-24 PROCEDURE — 3078F DIAST BP <80 MM HG: CPT | Performed by: UROLOGY

## 2024-07-24 ASSESSMENT — ACTIVITIES OF DAILY LIVING (ADL)
DRESSING_LB_CURRENT_FUNCTION: MODERATE ASSIST
BATHING_CURRENT_FUNCTION: MODERATE ASSIST
BATHING ASSESSED: 1

## 2024-07-24 ASSESSMENT — ENCOUNTER SYMPTOMS
DENIES PAIN: 1
PERSON REPORTING PAIN: PATIENT

## 2024-07-25 ENCOUNTER — TELEPHONE (OUTPATIENT)
Dept: PRIMARY CARE | Facility: CLINIC | Age: 89
End: 2024-07-25
Payer: MEDICARE

## 2024-07-25 NOTE — TELEPHONE ENCOUNTER
CALLED TO SEE IF THE PAPER WORK FOR THE [facility] WAS DONE THAT THEY BROUGHT IN A COUPLE WEEKS AGO. THANK YOU. WOULD LIKE A CALL BACK.

## 2024-07-26 ENCOUNTER — HOME CARE VISIT (OUTPATIENT)
Dept: HOME HEALTH SERVICES | Facility: HOME HEALTH | Age: 89
End: 2024-07-26
Payer: MEDICARE

## 2024-07-26 PROCEDURE — G0157 HHC PT ASSISTANT EA 15: HCPCS | Mod: CQ,HHH

## 2024-07-26 SDOH — HEALTH STABILITY: PHYSICAL HEALTH: EXERCISE TYPE: SEATED

## 2024-07-26 SDOH — HEALTH STABILITY: PHYSICAL HEALTH
EXERCISE COMMENTS: PT COMPLETED SEATED THER EX X 20 EA:  ANKLE PUMPS     MARCHING           LAQS                HIP ABD/ADD     HS CURLS            HIP ROT

## 2024-07-26 ASSESSMENT — ACTIVITIES OF DAILY LIVING (ADL)
AMBULATION ASSISTANCE: 1
CURRENT_FUNCTION: STAND BY ASSIST
PHYSICAL TRANSFERS ASSESSED: 1
AMBULATION ASSISTANCE: CONTACT GUARD ASSIST
AMBULATION ASSISTANCE ON FLAT SURFACES: 1

## 2024-07-26 ASSESSMENT — ENCOUNTER SYMPTOMS
HIGHEST PAIN SEVERITY IN PAST 24 HOURS: 1/10
DENIES PAIN: 1
MUSCLE WEAKNESS: 1
PERSON REPORTING PAIN: PATIENT

## 2024-07-26 NOTE — TELEPHONE ENCOUNTER
I refaxed forms to the [facility] for patient and did receive a fax confirmation that the fax went through.

## 2024-07-26 NOTE — TELEPHONE ENCOUNTER
I let Yury know that it looks like the paperwork for the [facility] was completed and faxed to them on 7/16/24.  He stated they didn't get it.  I let him know we got a confirmation that it did go through.  I will print out the forms from patients chart and refax them today.

## 2024-07-29 ENCOUNTER — HOME CARE VISIT (OUTPATIENT)
Dept: HOME HEALTH SERVICES | Facility: HOME HEALTH | Age: 89
End: 2024-07-29
Payer: MEDICARE

## 2024-07-29 ENCOUNTER — TELEPHONE (OUTPATIENT)
Dept: PRIMARY CARE | Facility: CLINIC | Age: 89
End: 2024-07-29
Payer: MEDICARE

## 2024-07-30 ENCOUNTER — HOME CARE VISIT (OUTPATIENT)
Dept: HOME HEALTH SERVICES | Facility: HOME HEALTH | Age: 89
End: 2024-07-30
Payer: MEDICARE

## 2024-07-30 VITALS
RESPIRATION RATE: 18 BRPM | DIASTOLIC BLOOD PRESSURE: 70 MMHG | TEMPERATURE: 97.4 F | OXYGEN SATURATION: 95 % | HEART RATE: 88 BPM | SYSTOLIC BLOOD PRESSURE: 138 MMHG

## 2024-07-30 PROCEDURE — G0299 HHS/HOSPICE OF RN EA 15 MIN: HCPCS | Mod: HHH

## 2024-07-30 PROCEDURE — G0157 HHC PT ASSISTANT EA 15: HCPCS | Mod: CQ,HHH

## 2024-07-30 SDOH — HEALTH STABILITY: PHYSICAL HEALTH
EXERCISE COMMENTS: PT COMPLETED SEATED THER EX X20 EA:  ANKLE PUMPS     MARCHING           LAQS                HIP ABD/ADD     HS CURLS            HIP ROT

## 2024-07-30 SDOH — HEALTH STABILITY: PHYSICAL HEALTH: EXERCISE TYPE: SEATED

## 2024-07-30 ASSESSMENT — ENCOUNTER SYMPTOMS
APPETITE LEVEL: GOOD
PERSON REPORTING PAIN: PATIENT
PAIN LOCATION - PAIN SEVERITY: 0/10
PAIN LOCATION: GENERALIZED
BOWEL PATTERN NORMAL: 1
PAIN SEVERITY GOAL: 0/10
PERSON REPORTING PAIN: PATIENT
STOOL FREQUENCY: DAILY
LOWEST PAIN SEVERITY IN PAST 24 HOURS: 0/10
FORGETFULNESS: 1
DENIES PAIN: 1
PAIN: 1
HIGHEST PAIN SEVERITY IN PAST 24 HOURS: 3/10
LOWER EXTREMITY EDEMA: 1
LAST BOWEL MOVEMENT: 67050
MUSCLE WEAKNESS: 1
MUSCLE WEAKNESS: 1
CHANGE IN APPETITE: UNCHANGED
PAIN LOCATION - PAIN FREQUENCY: INTERMITTENT
PAIN LOCATION - PAIN QUALITY: ACHY

## 2024-07-30 ASSESSMENT — ACTIVITIES OF DAILY LIVING (ADL)
AMBULATION ASSISTANCE: 1
AMBULATION ASSISTANCE: STAND BY ASSIST
CURRENT_FUNCTION: CONTACT GUARD ASSIST
PHYSICAL TRANSFERS ASSESSED: 1
LIGHT HOUSEKEEPING: DEPENDENT
TRANSPORTATION: DEPENDENT
AMBULATION ASSISTANCE: SUPERVISION
CURRENT_FUNCTION: SUPERVISION
AMBULATION ASSISTANCE ON FLAT SURFACES: 1
PHYSICAL TRANSFERS ASSESSED: 1
TRANSPORTATION ASSESSED: 1
AMBULATION_DISTANCE/DURATION_TOLERATED: X~100'
PREPARING MEALS: DEPENDENT
PHYSICAL_TRANSFERS_DEVICES: WALKER
AMBULATION ASSISTANCE: 1
HOUSEKEEPING ASSESSED: 1

## 2024-07-31 ENCOUNTER — HOME CARE VISIT (OUTPATIENT)
Dept: HOME HEALTH SERVICES | Facility: HOME HEALTH | Age: 89
End: 2024-07-31
Payer: MEDICARE

## 2024-07-31 PROCEDURE — G0152 HHCP-SERV OF OT,EA 15 MIN: HCPCS | Mod: HHH

## 2024-07-31 ASSESSMENT — ACTIVITIES OF DAILY LIVING (ADL)
BATHING_CURRENT_FUNCTION: MODERATE ASSIST
DRESSING_LB_CURRENT_FUNCTION: MINIMUM ASSIST
BATHING ASSESSED: 1
BATHING_CURRENT_FUNCTION: MINIMUM ASSIST
DRESSING_LB_CURRENT_FUNCTION: MODERATE ASSIST

## 2024-07-31 ASSESSMENT — ENCOUNTER SYMPTOMS
DENIES PAIN: 1
PERSON REPORTING PAIN: PATIENT

## 2024-08-01 ENCOUNTER — HOME CARE VISIT (OUTPATIENT)
Dept: HOME HEALTH SERVICES | Facility: HOME HEALTH | Age: 89
End: 2024-08-01
Payer: MEDICARE

## 2024-08-01 PROCEDURE — G0157 HHC PT ASSISTANT EA 15: HCPCS | Mod: CQ,HHH

## 2024-08-01 SDOH — HEALTH STABILITY: PHYSICAL HEALTH: EXERCISE TYPE: SEATED

## 2024-08-01 SDOH — HEALTH STABILITY: PHYSICAL HEALTH
EXERCISE COMMENTS: PT COMPLETED SEATED THER EX X15 EA:  ANKLE PUMPS     MARCHING           LAQS                HIP ABD/ADD     HS CURLS            HIP ROT

## 2024-08-01 ASSESSMENT — ACTIVITIES OF DAILY LIVING (ADL)
PHYSICAL TRANSFERS ASSESSED: 1
AMBULATION ASSISTANCE ON FLAT SURFACES: 1
AMBULATION_DISTANCE/DURATION_TOLERATED: X~100'
AMBULATION ASSISTANCE: SUPERVISION
AMBULATION ASSISTANCE: 1
CURRENT_FUNCTION: SUPERVISION

## 2024-08-01 ASSESSMENT — ENCOUNTER SYMPTOMS
PERSON REPORTING PAIN: PATIENT
DENIES PAIN: 1
LIMITED RANGE OF MOTION: 1
MUSCLE WEAKNESS: 1

## 2024-08-02 ENCOUNTER — LAB (OUTPATIENT)
Dept: LAB | Facility: LAB | Age: 89
End: 2024-08-02
Payer: MEDICARE

## 2024-08-02 ENCOUNTER — APPOINTMENT (OUTPATIENT)
Dept: PRIMARY CARE | Facility: CLINIC | Age: 89
End: 2024-08-02
Payer: MEDICARE

## 2024-08-02 VITALS
OXYGEN SATURATION: 98 % | HEART RATE: 63 BPM | SYSTOLIC BLOOD PRESSURE: 136 MMHG | HEIGHT: 66 IN | WEIGHT: 137 LBS | DIASTOLIC BLOOD PRESSURE: 72 MMHG | BODY MASS INDEX: 22.02 KG/M2

## 2024-08-02 DIAGNOSIS — M79.89 LEFT UPPER EXTREMITY SWELLING: ICD-10-CM

## 2024-08-02 DIAGNOSIS — R06.02 SHORTNESS OF BREATH: Primary | ICD-10-CM

## 2024-08-02 LAB
ANION GAP SERPL CALC-SCNC: 10 MMOL/L (ref 10–20)
BUN SERPL-MCNC: 34 MG/DL (ref 6–23)
CALCIUM SERPL-MCNC: 9.3 MG/DL (ref 8.6–10.3)
CHLORIDE SERPL-SCNC: 105 MMOL/L (ref 98–107)
CO2 SERPL-SCNC: 32 MMOL/L (ref 21–32)
CREAT SERPL-MCNC: 1.44 MG/DL (ref 0.5–1.3)
EGFRCR SERPLBLD CKD-EPI 2021: 47 ML/MIN/1.73M*2
GLUCOSE SERPL-MCNC: 85 MG/DL (ref 74–99)
POTASSIUM SERPL-SCNC: 4.5 MMOL/L (ref 3.5–5.3)
SODIUM SERPL-SCNC: 142 MMOL/L (ref 136–145)

## 2024-08-02 PROCEDURE — 99213 OFFICE O/P EST LOW 20 MIN: CPT | Performed by: STUDENT IN AN ORGANIZED HEALTH CARE EDUCATION/TRAINING PROGRAM

## 2024-08-02 PROCEDURE — 1159F MED LIST DOCD IN RCRD: CPT | Performed by: STUDENT IN AN ORGANIZED HEALTH CARE EDUCATION/TRAINING PROGRAM

## 2024-08-02 PROCEDURE — 1036F TOBACCO NON-USER: CPT | Performed by: STUDENT IN AN ORGANIZED HEALTH CARE EDUCATION/TRAINING PROGRAM

## 2024-08-02 PROCEDURE — 3078F DIAST BP <80 MM HG: CPT | Performed by: STUDENT IN AN ORGANIZED HEALTH CARE EDUCATION/TRAINING PROGRAM

## 2024-08-02 PROCEDURE — 1160F RVW MEDS BY RX/DR IN RCRD: CPT | Performed by: STUDENT IN AN ORGANIZED HEALTH CARE EDUCATION/TRAINING PROGRAM

## 2024-08-02 PROCEDURE — 80048 BASIC METABOLIC PNL TOTAL CA: CPT

## 2024-08-02 PROCEDURE — 1111F DSCHRG MED/CURRENT MED MERGE: CPT | Performed by: STUDENT IN AN ORGANIZED HEALTH CARE EDUCATION/TRAINING PROGRAM

## 2024-08-02 PROCEDURE — 36415 COLL VENOUS BLD VENIPUNCTURE: CPT

## 2024-08-02 PROCEDURE — 3075F SYST BP GE 130 - 139MM HG: CPT | Performed by: STUDENT IN AN ORGANIZED HEALTH CARE EDUCATION/TRAINING PROGRAM

## 2024-08-02 RX ORDER — FUROSEMIDE 20 MG/1
20 TABLET ORAL DAILY
Qty: 15 TABLET | Refills: 0 | Status: SHIPPED | OUTPATIENT
Start: 2024-08-02 | End: 2024-08-02 | Stop reason: SDUPTHER

## 2024-08-02 RX ORDER — FUROSEMIDE 20 MG/1
20 TABLET ORAL DAILY
Qty: 30 TABLET | Refills: 11 | Status: SHIPPED | OUTPATIENT
Start: 2024-08-02 | End: 2025-08-02

## 2024-08-02 NOTE — PROGRESS NOTES
Subjective   Patient ID: Tommy Richmond is a 89 y.o. male who presents for Follow-up .    HPI    PT is here today for 3 week FUV. PT has not moved into the nursing facility as of right now he is still at home. Reports the left arm is still a little swollen but better than it was. Was having some swelling in lower legs but that has also improved.   Will continue lasix for now. BMP today.   But given the unilateral swelling of the left upper extremity and someitmes left side of te face swelling, will order CT scan of the chest to rule out any pulmonary mass/tumor that may be compressing a vessel. Also left lower lung fields has diminished lung sounds. Crackles appreciated in the right lower lung fields. So for this reason CT scan is appropriate for further evaluation.     Review of Systems  ROS negative except discussed above in HPI.    Vitals:    08/02/24 0942   BP: 136/72   Pulse: 63   SpO2: 98%     Objective   Physical Exam  Constitutional:       Appearance: Normal appearance.   Pulmonary:      Breath sounds: Rales (right lower lung fields.) present.      Comments: Diminished left lower lung field air entry.   Neurological:      Mental Status: He is alert.           Assessment/Plan   Tommy was seen today for follow-up.  Diagnoses and all orders for this visit:  Shortness of breath (Primary)  -     CT chest wo IV contrast; Future  Left upper extremity swelling  -     Discontinue: furosemide (Lasix) 20 mg tablet; Take 1 tablet (20 mg) by mouth once daily.  -     CT chest wo IV contrast; Future  -     furosemide (Lasix) 20 mg tablet; Take 1 tablet (20 mg) by mouth once daily.  -     Basic Metabolic Panel; Future      Follow up in 4 weeks.         Brandan Mcclendon MD MPH

## 2024-08-05 ENCOUNTER — HOME CARE VISIT (OUTPATIENT)
Dept: HOME HEALTH SERVICES | Facility: HOME HEALTH | Age: 89
End: 2024-08-05
Payer: MEDICARE

## 2024-08-05 DIAGNOSIS — M79.89 LEFT UPPER EXTREMITY SWELLING: Primary | ICD-10-CM

## 2024-08-05 PROCEDURE — G0152 HHCP-SERV OF OT,EA 15 MIN: HCPCS | Mod: HHH

## 2024-08-05 ASSESSMENT — ENCOUNTER SYMPTOMS
PAIN LOCATION: LEFT SHOULDER
PAIN LOCATION: RIGHT SHOULDER
PAIN LOCATION - PAIN SEVERITY: 5/10
PAIN SEVERITY GOAL: 0/10
PAIN LOCATION - PAIN QUALITY: ACHE
PAIN LOCATION - RELIEVING FACTORS: MEDS REST
SUBJECTIVE PAIN PROGRESSION: GRADUALLY IMPROVING
PAIN LOCATION - RELIEVING FACTORS: MEDS REST
PAIN LOCATION - PAIN QUALITY: ACHE
PAIN LOCATION - PAIN SEVERITY: 5/10
HIGHEST PAIN SEVERITY IN PAST 24 HOURS: 6/10
LOWEST PAIN SEVERITY IN PAST 24 HOURS: 3/10

## 2024-08-05 ASSESSMENT — ACTIVITIES OF DAILY LIVING (ADL)
BATHING_CURRENT_FUNCTION: MODERATE ASSIST
DRESSING_LB_CURRENT_FUNCTION: MODERATE ASSIST
BATHING ASSESSED: 1
DRESSING_LB_CURRENT_FUNCTION: MINIMUM ASSIST
BATHING_CURRENT_FUNCTION: MINIMUM ASSIST

## 2024-08-06 ENCOUNTER — HOME CARE VISIT (OUTPATIENT)
Dept: HOME HEALTH SERVICES | Facility: HOME HEALTH | Age: 89
End: 2024-08-06
Payer: MEDICARE

## 2024-08-06 PROCEDURE — G0157 HHC PT ASSISTANT EA 15: HCPCS | Mod: CQ,HHH

## 2024-08-06 SDOH — HEALTH STABILITY: PHYSICAL HEALTH: EXERCISE TYPE: SEATED

## 2024-08-06 ASSESSMENT — ACTIVITIES OF DAILY LIVING (ADL)
AMBULATION_DISTANCE/DURATION_TOLERATED: X~
AMBULATION ASSISTANCE ON FLAT SURFACES: 1
AMBULATION ASSISTANCE: 1
CURRENT_FUNCTION: SUPERVISION
PHYSICAL TRANSFERS ASSESSED: 1
AMBULATION ASSISTANCE: SUPERVISION

## 2024-08-06 ASSESSMENT — ENCOUNTER SYMPTOMS
MUSCLE WEAKNESS: 1
DENIES PAIN: 1
PERSON REPORTING PAIN: PATIENT

## 2024-08-07 ENCOUNTER — HOME CARE VISIT (OUTPATIENT)
Dept: HOME HEALTH SERVICES | Facility: HOME HEALTH | Age: 89
End: 2024-08-07
Payer: MEDICARE

## 2024-08-07 PROCEDURE — G0152 HHCP-SERV OF OT,EA 15 MIN: HCPCS | Mod: HHH

## 2024-08-07 ASSESSMENT — ENCOUNTER SYMPTOMS
PERSON REPORTING PAIN: PATIENT
DENIES PAIN: 1

## 2024-08-07 ASSESSMENT — ACTIVITIES OF DAILY LIVING (ADL)
BATHING ASSESSED: 1
BATHING_CURRENT_FUNCTION: MINIMUM ASSIST
BATHING_CURRENT_FUNCTION: MODERATE ASSIST
DRESSING_LB_CURRENT_FUNCTION: MODERATE ASSIST
DRESSING_LB_CURRENT_FUNCTION: MINIMUM ASSIST

## 2024-08-08 ENCOUNTER — LAB (OUTPATIENT)
Dept: LAB | Facility: LAB | Age: 89
End: 2024-08-08
Payer: MEDICARE

## 2024-08-08 ENCOUNTER — HOSPITAL ENCOUNTER (OUTPATIENT)
Dept: RADIOLOGY | Facility: HOSPITAL | Age: 89
Discharge: HOME | End: 2024-08-08
Payer: MEDICARE

## 2024-08-08 ENCOUNTER — HOME CARE VISIT (OUTPATIENT)
Dept: HOME HEALTH SERVICES | Facility: HOME HEALTH | Age: 89
End: 2024-08-08
Payer: MEDICARE

## 2024-08-08 VITALS
HEART RATE: 62 BPM | TEMPERATURE: 98.2 F | OXYGEN SATURATION: 97 % | RESPIRATION RATE: 18 BRPM | DIASTOLIC BLOOD PRESSURE: 81 MMHG | SYSTOLIC BLOOD PRESSURE: 122 MMHG

## 2024-08-08 DIAGNOSIS — R06.02 SHORTNESS OF BREATH: ICD-10-CM

## 2024-08-08 DIAGNOSIS — M79.89 LEFT UPPER EXTREMITY SWELLING: ICD-10-CM

## 2024-08-08 LAB
ANION GAP SERPL CALC-SCNC: 8 MMOL/L (ref 10–20)
BUN SERPL-MCNC: 50 MG/DL (ref 6–23)
CALCIUM SERPL-MCNC: 8.9 MG/DL (ref 8.6–10.3)
CHLORIDE SERPL-SCNC: 107 MMOL/L (ref 98–107)
CO2 SERPL-SCNC: 27 MMOL/L (ref 21–32)
CREAT SERPL-MCNC: 1.54 MG/DL (ref 0.5–1.3)
EGFRCR SERPLBLD CKD-EPI 2021: 43 ML/MIN/1.73M*2
GLUCOSE SERPL-MCNC: 83 MG/DL (ref 74–99)
POTASSIUM SERPL-SCNC: 5.1 MMOL/L (ref 3.5–5.3)
SODIUM SERPL-SCNC: 137 MMOL/L (ref 136–145)

## 2024-08-08 PROCEDURE — 80048 BASIC METABOLIC PNL TOTAL CA: CPT

## 2024-08-08 PROCEDURE — 71250 CT THORAX DX C-: CPT

## 2024-08-08 PROCEDURE — G0151 HHCP-SERV OF PT,EA 15 MIN: HCPCS | Mod: HHH

## 2024-08-08 PROCEDURE — 36415 COLL VENOUS BLD VENIPUNCTURE: CPT

## 2024-08-08 PROCEDURE — 71250 CT THORAX DX C-: CPT | Performed by: RADIOLOGY

## 2024-08-08 SDOH — HEALTH STABILITY: PHYSICAL HEALTH: EXERCISE TYPE: HEP

## 2024-08-08 SDOH — ECONOMIC STABILITY: HOUSING INSECURITY: HOME SAFETY: HEAVILY EDUCATED ON RUG REMOVAL IN MAIN LIVING AREA.

## 2024-08-08 ASSESSMENT — ENCOUNTER SYMPTOMS
OCCASIONAL FEELINGS OF UNSTEADINESS: 1
LOSS OF SENSATION IN FEET: 0
PAIN LOCATION: BACK
PAIN: 1
PAIN LOCATION - PAIN FREQUENCY: INTERMITTENT
PAIN LOCATION - EXACERBATING FACTORS: MOVEMENT
HIGHEST PAIN SEVERITY IN PAST 24 HOURS: 3/10
LOWEST PAIN SEVERITY IN PAST 24 HOURS: 0/10
PAIN LOCATION - RELIEVING FACTORS: REST
PERSON REPORTING PAIN: PATIENT
DEPRESSION: 0
PAIN SEVERITY GOAL: 0/10
PAIN LOCATION - PAIN DURATION: DAILY
PAIN LOCATION - PAIN QUALITY: DULL
PAIN LOCATION - PAIN SEVERITY: 3/10

## 2024-08-08 ASSESSMENT — ACTIVITIES OF DAILY LIVING (ADL)
LIGHT HOUSEKEEPING: DEPENDENT
SHOPPING ASSESSED: 1
AMBULATION ASSISTANCE ON FLAT SURFACES: 1
HOUSEKEEPING ASSESSED: 1
AMBULATION_DISTANCE/DURATION_TOLERATED: 100 FEET
SHOPPING: DEPENDENT
LAUNDRY ASSESSED: 1
TRANSPORTATION: DEPENDENT
LAUNDRY: DEPENDENT
TRANSPORTATION ASSESSED: 1

## 2024-08-08 ASSESSMENT — PAIN SCALES - PAIN ASSESSMENT IN ADVANCED DEMENTIA (PAINAD)
CONSOLABILITY: 0 - NO NEED TO CONSOLE.
FACIALEXPRESSION: 0 - SMILING OR INEXPRESSIVE.
NEGVOCALIZATION: 0
TOTALSCORE: 0
BREATHING: 0
BODYLANGUAGE: 0
NEGVOCALIZATION: 0 - NONE.
FACIALEXPRESSION: 0
CONSOLABILITY: 0
BODYLANGUAGE: 0 - RELAXED.

## 2024-08-10 ENCOUNTER — HOME CARE VISIT (OUTPATIENT)
Dept: HOME HEALTH SERVICES | Facility: HOME HEALTH | Age: 89
End: 2024-08-10
Payer: MEDICARE

## 2024-08-12 ENCOUNTER — HOME CARE VISIT (OUTPATIENT)
Dept: HOME HEALTH SERVICES | Facility: HOME HEALTH | Age: 89
End: 2024-08-12
Payer: MEDICARE

## 2024-08-12 VITALS
SYSTOLIC BLOOD PRESSURE: 138 MMHG | TEMPERATURE: 97.2 F | HEART RATE: 77 BPM | DIASTOLIC BLOOD PRESSURE: 58 MMHG | RESPIRATION RATE: 18 BRPM | OXYGEN SATURATION: 97 %

## 2024-08-12 PROCEDURE — G0152 HHCP-SERV OF OT,EA 15 MIN: HCPCS | Mod: HHH

## 2024-08-12 PROCEDURE — G0299 HHS/HOSPICE OF RN EA 15 MIN: HCPCS | Mod: HHH

## 2024-08-12 ASSESSMENT — ENCOUNTER SYMPTOMS
BOWEL PATTERN NORMAL: 1
STOOL FREQUENCY: DAILY
PAIN LOCATION - RELIEVING FACTORS: REST AND MEDICATIONS
MUSCLE WEAKNESS: 1
CHANGE IN APPETITE: UNCHANGED
LOWER EXTREMITY EDEMA: 1
PAIN LOCATION - PAIN SEVERITY: 3/10
PAIN LOCATION - PAIN FREQUENCY: INTERMITTENT
PERSON REPORTING PAIN: PATIENT
LOWEST PAIN SEVERITY IN PAST 24 HOURS: 3/10
SUBJECTIVE PAIN PROGRESSION: UNCHANGED
PAIN LOCATION: BACK
PAIN LOCATION - PAIN QUALITY: ACHY
PAIN LOCATION - EXACERBATING FACTORS: ACTIVITY AND POSITIONAL
PAIN: 1
LAST BOWEL MOVEMENT: 67063
PERSON REPORTING PAIN: PATIENT
DENIES PAIN: 1
HIGHEST PAIN SEVERITY IN PAST 24 HOURS: 4/10
PAIN SEVERITY GOAL: 2/10
APPETITE LEVEL: GOOD

## 2024-08-12 ASSESSMENT — ACTIVITIES OF DAILY LIVING (ADL)
PREPARING MEALS: DEPENDENT
TRANSPORTATION: DEPENDENT
LIGHT HOUSEKEEPING: DEPENDENT
BATHING_CURRENT_FUNCTION: MINIMUM ASSIST
AMBULATION ASSISTANCE: STAND BY ASSIST
PHYSICAL TRANSFERS ASSESSED: 1
DRESSING_LB_CURRENT_FUNCTION: MINIMUM ASSIST
BATHING_CURRENT_FUNCTION: MODERATE ASSIST
HOUSEKEEPING ASSESSED: 1
TRANSPORTATION ASSESSED: 1
DRESSING_LB_CURRENT_FUNCTION: MODERATE ASSIST
PHYSICAL_TRANSFERS_DEVICES: ROLLATOR
CURRENT_FUNCTION: CONTACT GUARD ASSIST
BATHING ASSESSED: 1
AMBULATION ASSISTANCE: 1

## 2024-08-14 ENCOUNTER — HOME CARE VISIT (OUTPATIENT)
Dept: HOME HEALTH SERVICES | Facility: HOME HEALTH | Age: 89
End: 2024-08-14
Payer: MEDICARE

## 2024-08-14 PROCEDURE — G0152 HHCP-SERV OF OT,EA 15 MIN: HCPCS | Mod: HHH

## 2024-08-14 ASSESSMENT — ACTIVITIES OF DAILY LIVING (ADL)
BATHING_CURRENT_FUNCTION: MINIMUM ASSIST
DRESSING_LB_CURRENT_FUNCTION: MINIMUM ASSIST
DRESSING_LB_CURRENT_FUNCTION: MODERATE ASSIST
BATHING ASSESSED: 1
BATHING_CURRENT_FUNCTION: MODERATE ASSIST

## 2024-08-15 ENCOUNTER — PATIENT OUTREACH (OUTPATIENT)
Dept: PRIMARY CARE | Facility: CLINIC | Age: 89
End: 2024-08-15
Payer: MEDICARE

## 2024-08-15 NOTE — PROGRESS NOTES
Patient ID: Tommy Richmond is a 89 y.o. male.    Procedures  The patient was prepped and draped in the standard fashion.  Lidociane jelly was used on the urethral   Meatus.  A 16 Yi red rubber catheter was placed into the bladder.  The bladder was drained.  The bladder was  Then irrigated several times until clear without mucous.  Aprroximately 60ml of a saline and _____BETADINE____________  Solutions was left in the bladder.  The catheter was removed and patient  was asked to hold the solution in the  Bladder as long as possible      FOLLOW UP BETADINE IRRIGATION IN 1 MONTH.

## 2024-08-19 ENCOUNTER — HOME CARE VISIT (OUTPATIENT)
Dept: HOME HEALTH SERVICES | Facility: HOME HEALTH | Age: 89
End: 2024-08-19
Payer: MEDICARE

## 2024-08-19 PROCEDURE — G0152 HHCP-SERV OF OT,EA 15 MIN: HCPCS | Mod: HHH

## 2024-08-19 ASSESSMENT — ACTIVITIES OF DAILY LIVING (ADL)
DRESSING_LB_CURRENT_FUNCTION: MINIMUM ASSIST
BATHING_CURRENT_FUNCTION: MINIMUM ASSIST
DRESSING_LB_CURRENT_FUNCTION: MODERATE ASSIST
BATHING_CURRENT_FUNCTION: MODERATE ASSIST
BATHING ASSESSED: 1

## 2024-08-19 ASSESSMENT — ENCOUNTER SYMPTOMS
DENIES PAIN: 1
PERSON REPORTING PAIN: PATIENT

## 2024-08-20 ENCOUNTER — TELEPHONE (OUTPATIENT)
Dept: PRIMARY CARE | Facility: CLINIC | Age: 89
End: 2024-08-20
Payer: MEDICARE

## 2024-08-20 DIAGNOSIS — F32.A DEPRESSION, UNSPECIFIED DEPRESSION TYPE: Primary | ICD-10-CM

## 2024-08-20 RX ORDER — VILAZODONE HYDROCHLORIDE 10 MG/1
10 TABLET ORAL
Qty: 30 TABLET | Refills: 3 | Status: SHIPPED | OUTPATIENT
Start: 2024-08-20

## 2024-08-20 NOTE — TELEPHONE ENCOUNTER
Was prescribed 2 different medications for depression, at the time , reports the first med had side effects that could affect the eyes and he did not want to do that so he thinks you prescribed buspar and now his dad wants to try this and they want to make sure no side effects that could affect his eyes and 2nd what you think about trying this.

## 2024-08-20 NOTE — TELEPHONE ENCOUNTER
Calling to get results from recent scans on chest & the anti depressant meds, has not taken yet. There were a couple prescribed, please call to clarify further questions

## 2024-08-21 ENCOUNTER — HOME CARE VISIT (OUTPATIENT)
Dept: HOME HEALTH SERVICES | Facility: HOME HEALTH | Age: 89
End: 2024-08-21
Payer: MEDICARE

## 2024-08-21 ENCOUNTER — APPOINTMENT (OUTPATIENT)
Dept: UROLOGY | Facility: CLINIC | Age: 89
End: 2024-08-21
Payer: MEDICARE

## 2024-08-21 DIAGNOSIS — N39.0 RECURRENT UTI: ICD-10-CM

## 2024-08-21 PROCEDURE — 1036F TOBACCO NON-USER: CPT | Performed by: UROLOGY

## 2024-08-21 PROCEDURE — 51700 IRRIGATION OF BLADDER: CPT | Performed by: UROLOGY

## 2024-08-21 PROCEDURE — 1159F MED LIST DOCD IN RCRD: CPT | Performed by: UROLOGY

## 2024-08-22 ENCOUNTER — TELEPHONE (OUTPATIENT)
Dept: PRIMARY CARE | Facility: CLINIC | Age: 89
End: 2024-08-22
Payer: MEDICARE

## 2024-08-22 NOTE — TELEPHONE ENCOUNTER
Called and spoke to pt son Yury (on his HIPAA) and let him know that the medication Vilazodone was approved through his insurance. Called [facility] and spoke to Letty and let her know the insurance approved to cover the medication as well, she ran it through and got an approval.

## 2024-08-26 ENCOUNTER — HOME CARE VISIT (OUTPATIENT)
Dept: HOME HEALTH SERVICES | Facility: HOME HEALTH | Age: 89
End: 2024-08-26
Payer: MEDICARE

## 2024-08-26 PROCEDURE — G0152 HHCP-SERV OF OT,EA 15 MIN: HCPCS | Mod: HHH

## 2024-08-26 ASSESSMENT — ACTIVITIES OF DAILY LIVING (ADL)
DRESSING_LB_CURRENT_FUNCTION: MINIMUM ASSIST
BATHING_CURRENT_FUNCTION: MODERATE ASSIST
DRESSING_LB_CURRENT_FUNCTION: MODERATE ASSIST
BATHING_CURRENT_FUNCTION: MINIMUM ASSIST
BATHING ASSESSED: 1

## 2024-08-26 ASSESSMENT — ENCOUNTER SYMPTOMS
PERSON REPORTING PAIN: PATIENT
DENIES PAIN: 1

## 2024-08-28 ENCOUNTER — HOME CARE VISIT (OUTPATIENT)
Dept: HOME HEALTH SERVICES | Facility: HOME HEALTH | Age: 89
End: 2024-08-28
Payer: MEDICARE

## 2024-08-28 PROCEDURE — G0152 HHCP-SERV OF OT,EA 15 MIN: HCPCS | Mod: HHH

## 2024-08-28 ASSESSMENT — ENCOUNTER SYMPTOMS
PERSON REPORTING PAIN: PATIENT
DENIES PAIN: 1

## 2024-08-28 ASSESSMENT — ACTIVITIES OF DAILY LIVING (ADL)
HOME_HEALTH_OASIS: 00
OASIS_M1830: 02

## 2024-09-06 ENCOUNTER — APPOINTMENT (OUTPATIENT)
Dept: ORTHOPEDIC SURGERY | Facility: CLINIC | Age: 89
End: 2024-09-06
Payer: MEDICARE

## 2024-09-12 NOTE — PROGRESS NOTES
Patient ID: Tommy Richmond is a 89 y.o. male.    Procedures  The patient was prepped and draped in the standard fashion.  Lidociane jelly was used on the urethral   Meatus.  A 16 Kiswahili red rubber catheter was placed into the bladder.  The bladder was drained.  The bladder was  Then irrigated several times until clear without mucous.  Aprroximately 60ml of a saline and ________BETADINE________  Solutions was left in the bladder.  The catheter was removed and patient  was asked to hold the solution in the  Bladder as long as possible    FOLLOW UP BETADINE IRRIGATION IN 1 MONTH

## 2024-09-14 ENCOUNTER — APPOINTMENT (OUTPATIENT)
Dept: RADIOLOGY | Facility: HOSPITAL | Age: 89
End: 2024-09-14
Payer: MEDICARE

## 2024-09-14 ENCOUNTER — HOSPITAL ENCOUNTER (EMERGENCY)
Facility: HOSPITAL | Age: 89
Discharge: HOME | End: 2024-09-14
Payer: MEDICARE

## 2024-09-14 ENCOUNTER — HOSPITAL ENCOUNTER (OUTPATIENT)
Dept: CARDIOLOGY | Facility: HOSPITAL | Age: 89
Discharge: HOME | End: 2024-09-14
Payer: MEDICARE

## 2024-09-14 VITALS
DIASTOLIC BLOOD PRESSURE: 85 MMHG | WEIGHT: 140 LBS | HEIGHT: 69 IN | TEMPERATURE: 97 F | BODY MASS INDEX: 20.73 KG/M2 | SYSTOLIC BLOOD PRESSURE: 165 MMHG | HEART RATE: 84 BPM | RESPIRATION RATE: 17 BRPM | OXYGEN SATURATION: 93 %
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
## 2024-09-14 DIAGNOSIS — N39.0 URINARY TRACT INFECTION WITHOUT HEMATURIA, SITE UNSPECIFIED: ICD-10-CM

## 2024-09-14 LAB
ALBUMIN SERPL BCP-MCNC: 3.5 G/DL (ref 3.4–5)
ALP SERPL-CCNC: 92 U/L (ref 33–136)
ALT SERPL W P-5'-P-CCNC: 38 U/L (ref 10–52)
AMORPH CRY #/AREA UR COMP ASSIST: ABNORMAL /HPF
ANION GAP SERPL CALC-SCNC: 10 MMOL/L (ref 10–20)
APPEARANCE UR: ABNORMAL
AST SERPL W P-5'-P-CCNC: 31 U/L (ref 9–39)
BACTERIA #/AREA URNS AUTO: ABNORMAL /HPF
BILIRUB SERPL-MCNC: 0.8 MG/DL (ref 0–1.2)
BILIRUB UR STRIP.AUTO-MCNC: NEGATIVE MG/DL
BUN SERPL-MCNC: 36 MG/DL (ref 6–23)
CALCIUM SERPL-MCNC: 9 MG/DL (ref 8.6–10.3)
CARDIAC TROPONIN I PNL SERPL HS: 12 NG/L (ref 0–20)
CHLORIDE SERPL-SCNC: 106 MMOL/L (ref 98–107)
CO2 SERPL-SCNC: 26 MMOL/L (ref 21–32)
COLOR UR: ABNORMAL
CREAT SERPL-MCNC: 1.27 MG/DL (ref 0.5–1.3)
EGFRCR SERPLBLD CKD-EPI 2021: 55 ML/MIN/1.73M*2
ERYTHROCYTE [DISTWIDTH] IN BLOOD BY AUTOMATED COUNT: 14.5 % (ref 11.5–14.5)
GLUCOSE SERPL-MCNC: 95 MG/DL (ref 74–99)
GLUCOSE UR STRIP.AUTO-MCNC: NORMAL MG/DL
HCT VFR BLD AUTO: 37.2 % (ref 41–52)
HGB BLD-MCNC: 11.9 G/DL (ref 13.5–17.5)
HOLD SPECIMEN: NORMAL
KETONES UR STRIP.AUTO-MCNC: NEGATIVE MG/DL
LEUKOCYTE ESTERASE UR QL STRIP.AUTO: ABNORMAL
MCH RBC QN AUTO: 29 PG (ref 26–34)
MCHC RBC AUTO-ENTMCNC: 32 G/DL (ref 32–36)
MCV RBC AUTO: 91 FL (ref 80–100)
MUCOUS THREADS #/AREA URNS AUTO: ABNORMAL /LPF
NITRITE UR QL STRIP.AUTO: NEGATIVE
NRBC BLD-RTO: 0 /100 WBCS (ref 0–0)
PH UR STRIP.AUTO: 7.5 [PH]
PLATELET # BLD AUTO: 201 X10*3/UL (ref 150–450)
POTASSIUM SERPL-SCNC: 4.1 MMOL/L (ref 3.5–5.3)
PROT SERPL-MCNC: 6.1 G/DL (ref 6.4–8.2)
PROT UR STRIP.AUTO-MCNC: ABNORMAL MG/DL
RBC # BLD AUTO: 4.1 X10*6/UL (ref 4.5–5.9)
RBC # UR STRIP.AUTO: ABNORMAL /UL
RBC #/AREA URNS AUTO: ABNORMAL /HPF
SODIUM SERPL-SCNC: 138 MMOL/L (ref 136–145)
SP GR UR STRIP.AUTO: 1.02
SQUAMOUS #/AREA URNS AUTO: ABNORMAL /HPF
TRI-PHOS CRY #/AREA UR COMP ASSIST: ABNORMAL /HPF
UROBILINOGEN UR STRIP.AUTO-MCNC: NORMAL MG/DL
WBC # BLD AUTO: 5.5 X10*3/UL (ref 4.4–11.3)
WBC #/AREA URNS AUTO: >50 /HPF
WBC CLUMPS #/AREA URNS AUTO: ABNORMAL /HPF

## 2024-09-14 PROCEDURE — 71045 X-RAY EXAM CHEST 1 VIEW: CPT | Performed by: STUDENT IN AN ORGANIZED HEALTH CARE EDUCATION/TRAINING PROGRAM

## 2024-09-14 PROCEDURE — 72125 CT NECK SPINE W/O DYE: CPT

## 2024-09-14 PROCEDURE — 96365 THER/PROPH/DIAG IV INF INIT: CPT

## 2024-09-14 PROCEDURE — 70450 CT HEAD/BRAIN W/O DYE: CPT | Performed by: RADIOLOGY

## 2024-09-14 PROCEDURE — 72125 CT NECK SPINE W/O DYE: CPT | Performed by: RADIOLOGY

## 2024-09-14 PROCEDURE — 81001 URINALYSIS AUTO W/SCOPE: CPT | Performed by: PHYSICIAN ASSISTANT

## 2024-09-14 PROCEDURE — 2500000004 HC RX 250 GENERAL PHARMACY W/ HCPCS (ALT 636 FOR OP/ED): Performed by: PHYSICIAN ASSISTANT

## 2024-09-14 PROCEDURE — 72170 X-RAY EXAM OF PELVIS: CPT | Performed by: RADIOLOGY

## 2024-09-14 PROCEDURE — 84484 ASSAY OF TROPONIN QUANT: CPT | Performed by: PHYSICIAN ASSISTANT

## 2024-09-14 PROCEDURE — 36415 COLL VENOUS BLD VENIPUNCTURE: CPT | Performed by: PHYSICIAN ASSISTANT

## 2024-09-14 PROCEDURE — 84075 ASSAY ALKALINE PHOSPHATASE: CPT | Performed by: PHYSICIAN ASSISTANT

## 2024-09-14 PROCEDURE — 87086 URINE CULTURE/COLONY COUNT: CPT | Mod: SAMLAB | Performed by: PHYSICIAN ASSISTANT

## 2024-09-14 PROCEDURE — 93005 ELECTROCARDIOGRAM TRACING: CPT

## 2024-09-14 PROCEDURE — 85027 COMPLETE CBC AUTOMATED: CPT | Performed by: PHYSICIAN ASSISTANT

## 2024-09-14 PROCEDURE — 71045 X-RAY EXAM CHEST 1 VIEW: CPT

## 2024-09-14 PROCEDURE — 99285 EMERGENCY DEPT VISIT HI MDM: CPT | Mod: 25

## 2024-09-14 PROCEDURE — 96375 TX/PRO/DX INJ NEW DRUG ADDON: CPT

## 2024-09-14 PROCEDURE — 70450 CT HEAD/BRAIN W/O DYE: CPT

## 2024-09-14 PROCEDURE — 72170 X-RAY EXAM OF PELVIS: CPT

## 2024-09-14 RX ORDER — CEFTRIAXONE 1 G/50ML
1 INJECTION, SOLUTION INTRAVENOUS ONCE
Status: COMPLETED | OUTPATIENT
Start: 2024-09-14 | End: 2024-09-14

## 2024-09-14 RX ORDER — ONDANSETRON HYDROCHLORIDE 2 MG/ML
4 INJECTION, SOLUTION INTRAVENOUS ONCE
Status: COMPLETED | OUTPATIENT
Start: 2024-09-14 | End: 2024-09-14

## 2024-09-14 RX ORDER — CEPHALEXIN 500 MG/1
500 CAPSULE ORAL 4 TIMES DAILY
Qty: 28 CAPSULE | Refills: 0 | Status: SHIPPED | OUTPATIENT
Start: 2024-09-14 | End: 2024-09-21

## 2024-09-14 RX ORDER — FENTANYL CITRATE 50 UG/ML
25 INJECTION, SOLUTION INTRAMUSCULAR; INTRAVENOUS ONCE
Status: COMPLETED | OUTPATIENT
Start: 2024-09-14 | End: 2024-09-14

## 2024-09-14 RX ADMIN — CEFTRIAXONE SODIUM 1 G: 1 INJECTION, SOLUTION INTRAVENOUS at 19:33

## 2024-09-14 RX ADMIN — FENTANYL CITRATE 25 MCG: 50 INJECTION, SOLUTION INTRAMUSCULAR; INTRAVENOUS at 20:02

## 2024-09-14 RX ADMIN — ONDANSETRON 4 MG: 2 INJECTION INTRAMUSCULAR; INTRAVENOUS at 20:02

## 2024-09-14 ASSESSMENT — PAIN SCALES - GENERAL: PAINLEVEL_OUTOF10: 5 - MODERATE PAIN

## 2024-09-14 ASSESSMENT — PAIN DESCRIPTION - LOCATION: LOCATION: HAND

## 2024-09-14 ASSESSMENT — COLUMBIA-SUICIDE SEVERITY RATING SCALE - C-SSRS
6. HAVE YOU EVER DONE ANYTHING, STARTED TO DO ANYTHING, OR PREPARED TO DO ANYTHING TO END YOUR LIFE?: NO
2. HAVE YOU ACTUALLY HAD ANY THOUGHTS OF KILLING YOURSELF?: NO
1. IN THE PAST MONTH, HAVE YOU WISHED YOU WERE DEAD OR WISHED YOU COULD GO TO SLEEP AND NOT WAKE UP?: NO

## 2024-09-14 ASSESSMENT — PAIN - FUNCTIONAL ASSESSMENT: PAIN_FUNCTIONAL_ASSESSMENT: 0-10

## 2024-09-14 NOTE — ED PROVIDER NOTES
HPI   Chief Complaint   Patient presents with    Fall     Patient states he missteped and fell, did hit his head. Denies LOC. No blood thinners       Patient presents with complaint of fall prior to arrival.  States it was a mechanical fall that he has trouble with his gait.  He denies lightheadedness or dizziness.  No chest pain or shortness of breath.  No syncope.  States he fell and hit his face to a door.  Patient denies loss of consciousness.  Denies any facial pain.  Denies any trouble with his vision.  States his teeth lined up perfectly.      History provided by:  Patient          Patient History   Past Medical History:   Diagnosis Date    Personal history of other diseases of urinary system     History of bladder stone     Past Surgical History:   Procedure Laterality Date    OTHER SURGICAL HISTORY  10/25/2019    Inguinal hernia repair    OTHER SURGICAL HISTORY  10/25/2019    Small bowel resection    OTHER SURGICAL HISTORY  10/25/2019    Colonoscopy    OTHER SURGICAL HISTORY  10/25/2019    Cystoscopy    OTHER SURGICAL HISTORY  10/25/2019    Tonsillectomy     Family History   Problem Relation Name Age of Onset    Colon cancer Father      Prostate cancer Father      Colon cancer Sister      Prostate cancer Brother       Social History     Tobacco Use    Smoking status: Former     Types: Cigarettes    Smokeless tobacco: Never   Vaping Use    Vaping status: Never Used   Substance Use Topics    Alcohol use: Never    Drug use: Never       Physical Exam   ED Triage Vitals [09/14/24 1744]   Temperature Heart Rate Respirations BP   36.1 °C (97 °F) 82 18 167/82      Pulse Ox Temp Source Heart Rate Source Patient Position   98 % Tympanic Monitor --      BP Location FiO2 (%)     -- --       Physical Exam  Vitals and nursing note reviewed.   Constitutional:       General: He is not in acute distress.     Appearance: Normal appearance. He is well-developed, well-groomed and normal weight. He is not ill-appearing,  toxic-appearing or diaphoretic.   HENT:      Head: Normocephalic. Abrasion present.      Jaw: No tenderness, pain on movement or malocclusion.        Comments: Crepitus to palpation to the bony landmarks of the face.     Right Ear: Ear canal and external ear normal.      Left Ear: Ear canal and external ear normal.      Nose: Nose normal.      Mouth/Throat:      Lips: Pink. No lesions.      Mouth: Mucous membranes are moist. No oral lesions.      Dentition: No gum lesions.      Tongue: No lesions.      Pharynx: Oropharynx is clear. No oropharyngeal exudate.   Eyes:      General: No scleral icterus.     Conjunctiva/sclera: Conjunctivae normal.   Cardiovascular:      Rate and Rhythm: Normal rate and regular rhythm.      Pulses:           Dorsalis pedis pulses are 2+ on the right side and 2+ on the left side.        Posterior tibial pulses are 2+ on the right side and 2+ on the left side.      Heart sounds: Normal heart sounds.   Pulmonary:      Effort: Pulmonary effort is normal.      Breath sounds: Normal breath sounds and air entry. No stridor.   Abdominal:      General: Bowel sounds are normal. There is no distension.      Palpations: Abdomen is soft.      Tenderness: There is no abdominal tenderness. There is no right CVA tenderness, left CVA tenderness or guarding.   Musculoskeletal:      Cervical back: No tenderness. No spinous process tenderness or muscular tenderness.      Comments: Palpating upper and lower extremities does not elicit any areas of tenderness.  Range of motion intact.  No midline axial spine tenderness on exam.  No step-off deformity   Skin:     General: Skin is warm.      Capillary Refill: Capillary refill takes less than 2 seconds.   Neurological:      General: No focal deficit present.      Mental Status: He is alert and oriented to person, place, and time.      Cranial Nerves: No cranial nerve deficit or facial asymmetry.      Sensory: No sensory deficit.      Motor: No weakness.       Gait: Gait normal.   Psychiatric:         Attention and Perception: Attention and perception normal.         Mood and Affect: Mood and affect normal.         Speech: Speech normal.         Behavior: Behavior normal. Behavior is cooperative.         Thought Content: Thought content normal.         Cognition and Memory: Cognition and memory normal.         Judgment: Judgment normal.           ED Course & MDM   ED Course as of 09/14/24 2014   Sat Sep 14, 2024   1846 2/25/22 - Tdap given.   [LH]      ED Course User Index  [LH] Elizabeth Ansari PA-C         Diagnoses as of 09/14/24 2014   Fall, initial encounter   Urinary tract infection without hematuria, site unspecified                 No data recorded     Bowlegs Coma Scale Score: 15 (09/14/24 1746 : Ximena Meredith RN)                           Medical Decision Making  Patient presents with complaint of fall prior to arrival.  States it was a mechanical fall that he has trouble with his gait.  He denies lightheadedness or dizziness.  No chest pain or shortness of breath.  No syncope.  States he fell and hit his face to a door.  Patient denies loss of consciousness.  Denies any facial pain.  Denies any trouble with his vision.  States his teeth lined up perfectly.    Ddx: Fracture, contusion, strain, sprain, other    Will obtain labs and x-ray.  As well as CT head and neck.  No acute findings were noted on x-rays.  Patient's urine is concerning for infection.  Patient has no leukocytosis.  Therefore Rocephin was ordered.  Did review his prior culture which showed some resistant patterns.  Appears that patient should have decent coverage with Keflex for home.  And Rocephin here in the ED.  Will obtain culture to note any change in this.  Wound on patient's face did not need any closure as it is a skin avulsion without need for closure.  Discussed findings with patient and family.  At this point patient is successfully discharged home in improved stable condition to  follow-up with family care provider in the next few days.    Problems Addressed:  Fall, initial encounter: acute illness or injury  Urinary tract infection without hematuria, site unspecified: undiagnosed new problem with uncertain prognosis     Details: Treatment with Rocephin in the ED and Keflex for home.  Pending any culture change.    Amount and/or Complexity of Data Reviewed  Labs: ordered. Decision-making details documented in ED Course.  Radiology: ordered and independent interpretation performed. Decision-making details documented in ED Course.  ECG/medicine tests: ordered and independent interpretation performed. Decision-making details documented in ED Course.     Details: Read by myself showing normal sinus rhythm at a ventricular rate of 80 bpm.  Normal axis.  No ST segment elevation.  AL interval is 192 and a QT of 414.  There is an incomplete right bundle branch.        Procedure  Procedures     Elizabeth Ansari PA-C  09/14/24 2014

## 2024-09-15 LAB — HOLD SPECIMEN: NORMAL

## 2024-09-17 ENCOUNTER — PATIENT OUTREACH (OUTPATIENT)
Dept: PRIMARY CARE | Facility: CLINIC | Age: 89
End: 2024-09-17
Payer: MEDICARE

## 2024-09-17 LAB — BACTERIA UR CULT: ABNORMAL

## 2024-09-18 ENCOUNTER — APPOINTMENT (OUTPATIENT)
Dept: UROLOGY | Facility: CLINIC | Age: 89
End: 2024-09-18
Payer: MEDICARE

## 2024-09-18 DIAGNOSIS — N39.0 RECURRENT UTI: ICD-10-CM

## 2024-09-18 PROCEDURE — 51700 IRRIGATION OF BLADDER: CPT | Performed by: UROLOGY

## 2024-09-18 PROCEDURE — 1159F MED LIST DOCD IN RCRD: CPT | Performed by: UROLOGY

## 2024-09-18 PROCEDURE — 1036F TOBACCO NON-USER: CPT | Performed by: UROLOGY

## 2024-09-19 LAB
ATRIAL RATE: 80 BPM
P AXIS: 62 DEGREES
P OFFSET: 171 MS
P ONSET: 126 MS
PR INTERVAL: 192 MS
Q ONSET: 222 MS
QRS COUNT: 13 BEATS
QRS DURATION: 104 MS
QT INTERVAL: 414 MS
QTC CALCULATION(BAZETT): 477 MS
QTC FREDERICIA: 456 MS
R AXIS: -52 DEGREES
T AXIS: 18 DEGREES
T OFFSET: 429 MS
VENTRICULAR RATE: 80 BPM

## 2024-09-24 DIAGNOSIS — T14.8XXA WOUND OF SKIN: Primary | ICD-10-CM

## 2024-09-24 RX ORDER — MUPIROCIN 20 MG/G
OINTMENT TOPICAL 3 TIMES DAILY
Qty: 22 G | Refills: 0 | Status: SHIPPED | OUTPATIENT
Start: 2024-09-24 | End: 2024-10-04

## 2024-09-26 ENCOUNTER — APPOINTMENT (OUTPATIENT)
Dept: PRIMARY CARE | Facility: CLINIC | Age: 89
End: 2024-09-26
Payer: MEDICARE

## 2024-09-26 ENCOUNTER — LAB (OUTPATIENT)
Dept: LAB | Facility: LAB | Age: 89
End: 2024-09-26
Payer: MEDICARE

## 2024-09-26 VITALS
OXYGEN SATURATION: 95 % | WEIGHT: 145 LBS | HEIGHT: 69 IN | BODY MASS INDEX: 21.48 KG/M2 | HEART RATE: 71 BPM | SYSTOLIC BLOOD PRESSURE: 130 MMHG | DIASTOLIC BLOOD PRESSURE: 72 MMHG

## 2024-09-26 DIAGNOSIS — Z13.29 SCREENING FOR THYROID DISORDER: ICD-10-CM

## 2024-09-26 DIAGNOSIS — R26.2 AMBULATORY DYSFUNCTION: ICD-10-CM

## 2024-09-26 DIAGNOSIS — Z12.5 PROSTATE CANCER SCREENING: ICD-10-CM

## 2024-09-26 DIAGNOSIS — R53.1 GENERALIZED WEAKNESS: ICD-10-CM

## 2024-09-26 DIAGNOSIS — N40.1 BENIGN PROSTATIC HYPERPLASIA WITH URINARY OBSTRUCTION AND OTHER LOWER URINARY TRACT SYMPTOMS: ICD-10-CM

## 2024-09-26 DIAGNOSIS — E87.6 HYPOKALEMIA: ICD-10-CM

## 2024-09-26 DIAGNOSIS — R20.0 FINGER NUMBNESS: ICD-10-CM

## 2024-09-26 DIAGNOSIS — H35.30 MACULAR DEGENERATION, UNSPECIFIED LATERALITY, UNSPECIFIED TYPE: ICD-10-CM

## 2024-09-26 DIAGNOSIS — L08.9 SKIN INFECTION: ICD-10-CM

## 2024-09-26 DIAGNOSIS — M43.02 CERVICAL SPONDYLOLYSIS: ICD-10-CM

## 2024-09-26 DIAGNOSIS — R30.0 DYSURIA: ICD-10-CM

## 2024-09-26 DIAGNOSIS — I10 HYPERTENSION, UNSPECIFIED TYPE: ICD-10-CM

## 2024-09-26 DIAGNOSIS — N13.8 BENIGN PROSTATIC HYPERPLASIA WITH URINARY OBSTRUCTION AND OTHER LOWER URINARY TRACT SYMPTOMS: ICD-10-CM

## 2024-09-26 DIAGNOSIS — Z00.00 ROUTINE GENERAL MEDICAL EXAMINATION AT HEALTH CARE FACILITY: Primary | ICD-10-CM

## 2024-09-26 DIAGNOSIS — Z13.220 SCREENING CHOLESTEROL LEVEL: ICD-10-CM

## 2024-09-26 LAB
ALBUMIN SERPL BCP-MCNC: 3.5 G/DL (ref 3.4–5)
ALP SERPL-CCNC: 97 U/L (ref 33–136)
ALT SERPL W P-5'-P-CCNC: 43 U/L (ref 10–52)
ANION GAP SERPL CALC-SCNC: 10 MMOL/L (ref 10–20)
AST SERPL W P-5'-P-CCNC: 35 U/L (ref 9–39)
BILIRUB SERPL-MCNC: 0.6 MG/DL (ref 0–1.2)
BUN SERPL-MCNC: 35 MG/DL (ref 6–23)
CALCIUM SERPL-MCNC: 8.7 MG/DL (ref 8.6–10.3)
CHLORIDE SERPL-SCNC: 109 MMOL/L (ref 98–107)
CHOLEST SERPL-MCNC: 136 MG/DL (ref 0–199)
CHOLESTEROL/HDL RATIO: 2.3
CO2 SERPL-SCNC: 25 MMOL/L (ref 21–32)
CREAT SERPL-MCNC: 1.18 MG/DL (ref 0.5–1.3)
EGFRCR SERPLBLD CKD-EPI 2021: 60 ML/MIN/1.73M*2
ERYTHROCYTE [DISTWIDTH] IN BLOOD BY AUTOMATED COUNT: 14.4 % (ref 11.5–14.5)
GLUCOSE SERPL-MCNC: 84 MG/DL (ref 74–99)
HCT VFR BLD AUTO: 36.6 % (ref 41–52)
HDLC SERPL-MCNC: 58 MG/DL
HGB BLD-MCNC: 11.5 G/DL (ref 13.5–17.5)
LDLC SERPL CALC-MCNC: 64 MG/DL
MCH RBC QN AUTO: 28.8 PG (ref 26–34)
MCHC RBC AUTO-ENTMCNC: 31.4 G/DL (ref 32–36)
MCV RBC AUTO: 92 FL (ref 80–100)
NON HDL CHOLESTEROL: 78 MG/DL (ref 0–149)
NRBC BLD-RTO: 0 /100 WBCS (ref 0–0)
PLATELET # BLD AUTO: 237 X10*3/UL (ref 150–450)
POTASSIUM SERPL-SCNC: 3.9 MMOL/L (ref 3.5–5.3)
PROT SERPL-MCNC: 6 G/DL (ref 6.4–8.2)
PSA SERPL-MCNC: 4.87 NG/ML
RBC # BLD AUTO: 4 X10*6/UL (ref 4.5–5.9)
SODIUM SERPL-SCNC: 140 MMOL/L (ref 136–145)
TRIGL SERPL-MCNC: 68 MG/DL (ref 0–149)
TSH SERPL-ACNC: 1.47 MIU/L (ref 0.44–3.98)
VLDL: 14 MG/DL (ref 0–40)
WBC # BLD AUTO: 5.6 X10*3/UL (ref 4.4–11.3)

## 2024-09-26 PROCEDURE — 3075F SYST BP GE 130 - 139MM HG: CPT

## 2024-09-26 PROCEDURE — 1036F TOBACCO NON-USER: CPT

## 2024-09-26 PROCEDURE — 36415 COLL VENOUS BLD VENIPUNCTURE: CPT

## 2024-09-26 PROCEDURE — 1159F MED LIST DOCD IN RCRD: CPT

## 2024-09-26 PROCEDURE — 99215 OFFICE O/P EST HI 40 MIN: CPT

## 2024-09-26 PROCEDURE — 1124F ACP DISCUSS-NO DSCNMKR DOCD: CPT

## 2024-09-26 PROCEDURE — G0439 PPPS, SUBSEQ VISIT: HCPCS

## 2024-09-26 PROCEDURE — 1170F FXNL STATUS ASSESSED: CPT

## 2024-09-26 PROCEDURE — 3078F DIAST BP <80 MM HG: CPT

## 2024-09-26 RX ORDER — SULFAMETHOXAZOLE AND TRIMETHOPRIM 800; 160 MG/1; MG/1
1 TABLET ORAL 2 TIMES DAILY
Qty: 10 TABLET | Refills: 0 | Status: SHIPPED | OUTPATIENT
Start: 2024-09-26 | End: 2024-10-01

## 2024-09-26 RX ORDER — LOSARTAN POTASSIUM AND HYDROCHLOROTHIAZIDE 25; 100 MG/1; MG/1
1 TABLET ORAL DAILY
Qty: 90 TABLET | Refills: 3 | Status: SHIPPED | OUTPATIENT
Start: 2024-09-26

## 2024-09-26 RX ORDER — OXYBUTYNIN CHLORIDE 10 MG/1
10 TABLET, EXTENDED RELEASE ORAL DAILY
Qty: 90 TABLET | Refills: 3 | Status: SHIPPED | OUTPATIENT
Start: 2024-09-26 | End: 2025-09-26

## 2024-09-26 ASSESSMENT — ACTIVITIES OF DAILY LIVING (ADL)
DRESSING: INDEPENDENT
GROCERY_SHOPPING: TOTAL CARE
MANAGING_FINANCES: NEEDS ASSISTANCE
DOING_HOUSEWORK: TOTAL CARE
TAKING_MEDICATION: INDEPENDENT
BATHING: NEEDS ASSISTANCE

## 2024-09-26 ASSESSMENT — ENCOUNTER SYMPTOMS
CARDIOVASCULAR NEGATIVE: 1
RESPIRATORY NEGATIVE: 1
GASTROINTESTINAL NEGATIVE: 1
CONSTITUTIONAL NEGATIVE: 1

## 2024-09-26 ASSESSMENT — PATIENT HEALTH QUESTIONNAIRE - PHQ9
2. FEELING DOWN, DEPRESSED OR HOPELESS: NOT AT ALL
1. LITTLE INTEREST OR PLEASURE IN DOING THINGS: NOT AT ALL
SUM OF ALL RESPONSES TO PHQ9 QUESTIONS 1 AND 2: 0

## 2024-09-26 NOTE — ASSESSMENT & PLAN NOTE
Orders:    losartan-hydrochlorothiazide (Hyzaar) 100-25 mg tablet; Take 1 tablet by mouth once daily.

## 2024-09-26 NOTE — ASSESSMENT & PLAN NOTE
Orders:    oxybutynin XL (Ditropan-XL) 10 mg 24 hr tablet; Take 1 tablet (10 mg) by mouth once daily.

## 2024-09-26 NOTE — PROGRESS NOTES
"Subjective   Reason for Visit: Tommy Richmond is an 89 y.o. male here for a Medicare Wellness visit.     Past Medical, Surgical, and Family History reviewed and updated in chart.    Reviewed all medications by prescribing practitioner or clinical pharmacist (such as prescriptions, OTCs, herbal therapies and supplements) and documented in the medical record.    HPI  HTN: /72 in office. BP good at home. Compliant with current regimen, no SE. Denies symptoms currently.   BPH: Follows with Dr. Cavanaugh.  OVERACTIVE BLADDER: Stable on oxybutynin, no SE.  HYPOKALEMIA: Chronic, compliant with 20mEq daily.  OSTEOPOROSIS: Compliant with Fosamax.  MACULAR DEGENERATION: Follows with eye doctor.    Earlier this year had episode of L arm/L side of face swelling, vascular neg DVT, Lasix was helpful, not taking currently.    About 2 weeks ago noticed boil to L shin, boil has since \"popped\" and now with wound and some surrounding erythema.     R hand thumb and index numbness for about 2 months, strength is good, no color change, severe spinal and cervical spinal degenerative changes.    He is nonambulatory for years now, weakness prevents him from ambulating as well as significant spinal degenerative changes. Also vision is almost nonexistent.    Patient Care Team:  No Assigned Pcp Generic Provider, MD as PCP - General (General Practice)  Brandan Mcclendon MD MPH as PCP - [employer] PCP  Chuck Cavanaugh MD as Surgeon (Urology)  Janice Paris CMA as Care Manager (Case Management)     Review of Systems   Constitutional: Negative.    Respiratory: Negative.     Cardiovascular: Negative.    Gastrointestinal: Negative.        Objective   Vitals:  /72   Pulse 71   Ht 1.753 m (5' 9\")   Wt 65.8 kg (145 lb)   SpO2 95%   BMI 21.41 kg/m²       Physical Exam  Constitutional:       General: He is not in acute distress.     Appearance: Normal appearance. He is not ill-appearing.   HENT:      Head: Normocephalic " and atraumatic.   Eyes:      Extraocular Movements: Extraocular movements intact.      Conjunctiva/sclera: Conjunctivae normal.   Cardiovascular:      Rate and Rhythm: Normal rate.   Pulmonary:      Effort: Pulmonary effort is normal.   Abdominal:      General: There is no distension.   Musculoskeletal:         General: Normal range of motion.      Cervical back: Normal range of motion.   Skin:     General: Skin is warm and dry.      Capillary Refill: Capillary refill takes 2 to 3 seconds.      Findings: Erythema and lesion present.   Neurological:      General: No focal deficit present.      Mental Status: He is alert and oriented to person, place, and time.   Psychiatric:         Mood and Affect: Mood normal.         Behavior: Behavior normal.         Thought Content: Thought content normal.         Judgment: Judgment normal.         Assessment & Plan  Hypertension, unspecified type    Orders:    losartan-hydrochlorothiazide (Hyzaar) 100-25 mg tablet; Take 1 tablet by mouth once daily.    Dysuria    Orders:    oxybutynin XL (Ditropan-XL) 10 mg 24 hr tablet; Take 1 tablet (10 mg) by mouth once daily.    Routine general medical examination at health care facility    Orders:    1 Year Follow Up In Primary Care - Wellness Exam; Future    1 Year Follow Up In Primary Care - Wellness Exam    Skin infection    Orders:    sulfamethoxazole-trimethoprim (Bactrim DS) 800-160 mg tablet; Take 1 tablet by mouth 2 times a day for 5 days.     Finger numbness    Orders:    EMG & nerve conduction; Future    Screening cholesterol level    Orders:    Lipid Panel; Future    Screening for thyroid disorder    Orders:    TSH with reflex to Free T4 if abnormal; Future    Prostate cancer screening    Orders:    Prostate Specific Antigen, Screen; Future    Body mass index (BMI) of 21.0 to 21.9 in adult    Orders:    CBC; Future    Comprehensive Metabolic Panel; Future    Ambulatory dysfunction    Orders:    General supply request  Wheelchair    Generalized weakness    Orders:    General supply request Wheelchair    Macular degeneration, unspecified laterality, unspecified type         Benign prostatic hyperplasia with urinary obstruction and other lower urinary tract symptoms         Cervical spondylolysis         Hypokalemia              Will fax order for wheelchair to [facility].  Rx Bactrim for skin wound, use mupirocin which was sent in two days ago, follow up next week with either Dr. Tejada or Ernie for wound check.  EMG ordered.  Fasting labs today.  No other chronic medication changes today.  Follow up 6 months or sooner prn.

## 2024-10-04 ENCOUNTER — APPOINTMENT (OUTPATIENT)
Dept: PRIMARY CARE | Facility: CLINIC | Age: 89
End: 2024-10-04
Payer: MEDICARE

## 2024-10-04 VITALS
DIASTOLIC BLOOD PRESSURE: 68 MMHG | HEIGHT: 69 IN | OXYGEN SATURATION: 94 % | BODY MASS INDEX: 21.12 KG/M2 | SYSTOLIC BLOOD PRESSURE: 104 MMHG | HEART RATE: 58 BPM | WEIGHT: 142.6 LBS

## 2024-10-04 DIAGNOSIS — I10 HYPERTENSION, UNSPECIFIED TYPE: ICD-10-CM

## 2024-10-04 DIAGNOSIS — L97.921 SKIN ULCER OF LEFT LOWER LEG, LIMITED TO BREAKDOWN OF SKIN: Primary | ICD-10-CM

## 2024-10-04 PROCEDURE — 1036F TOBACCO NON-USER: CPT | Performed by: FAMILY MEDICINE

## 2024-10-04 PROCEDURE — 1159F MED LIST DOCD IN RCRD: CPT | Performed by: FAMILY MEDICINE

## 2024-10-04 PROCEDURE — 3078F DIAST BP <80 MM HG: CPT | Performed by: FAMILY MEDICINE

## 2024-10-04 PROCEDURE — 99213 OFFICE O/P EST LOW 20 MIN: CPT | Performed by: FAMILY MEDICINE

## 2024-10-04 PROCEDURE — 3074F SYST BP LT 130 MM HG: CPT | Performed by: FAMILY MEDICINE

## 2024-10-04 PROCEDURE — 1160F RVW MEDS BY RX/DR IN RCRD: CPT | Performed by: FAMILY MEDICINE

## 2024-10-04 RX ORDER — LOSARTAN POTASSIUM AND HYDROCHLOROTHIAZIDE 25; 100 MG/1; MG/1
0.5 TABLET ORAL DAILY
Qty: 90 TABLET | Refills: 3 | Status: SHIPPED | OUTPATIENT
Start: 2024-10-04

## 2024-10-04 NOTE — PATIENT INSTRUCTIONS
Can clean the wound with soap and water  Leave open to air  Cover only if concerned about weepiness  on clothing     For the dizziness try taking 1/2 of the Losartan hydrochlorothiazide a day for awhile to see if Blood pressure is better. Right now it is low

## 2024-10-04 NOTE — PROGRESS NOTES
"Subjective   Patient ID: Tommy Richmond is a 89 y.o. male who presents for Wound Check (Pt is here today  for a wound check on his left lower leg. Reports he has woken up for the last 4-5 days very dizzy. Feels like he is falling. ).    Wound Check     Had boil on the leg that popped after 2 weeks and seen by Olegario last week. This is on the shin. Put on Bactrim and now he does not a lot of pain at any time. Some burning. Some weepiness.  No fever or chills. No side effects with medication     Has been noting when he wakes in the morning he is dizzy and feels like he is falling down.  Will feel good the rest of the day.      Review of Systems    Objective   /68   Pulse 58   Ht 1.753 m (5' 9\")   Wt 64.7 kg (142 lb 9.6 oz)   SpO2 94%   BMI 21.06 kg/m²     Physical Exam  Cardiovascular:      Rate and Rhythm: Normal rate and regular rhythm.      Heart sounds: No murmur heard.  Pulmonary:      Effort: Pulmonary effort is normal. No respiratory distress.      Breath sounds: Normal breath sounds.   Abdominal:      General: Abdomen is flat. There is no distension.      Palpations: Abdomen is soft.      Tenderness: There is no abdominal tenderness.   Musculoskeletal:      Right lower leg: No edema.      Left lower leg: No edema.     Left shin with 2 very shallow ulcers. The one was 1 cm and had devitalized epidermis on top of vital epidermis. So I took that off. The other is about 2 mm. No weeping or redness.     Assessment/Plan   Diagnoses and all orders for this visit:  Skin ulcer of left lower leg, limited to breakdown of skin  Hypertension, unspecified type  -     losartan-hydrochlorothiazide (Hyzaar) 100-25 mg tablet; Take 0.5 tablets by mouth once daily.         "

## 2024-10-08 ENCOUNTER — APPOINTMENT (OUTPATIENT)
Dept: PRIMARY CARE | Facility: CLINIC | Age: 89
End: 2024-10-08
Payer: MEDICARE

## 2024-10-09 NOTE — PROGRESS NOTES
Patient ID: Tommy Richmond is a 89 y.o. male.    Procedures  The patient was prepped and draped in the standard fashion.  Lidociane jelly was used on the urethral   Meatus.  A 16 Polish red rubber catheter was placed into the bladder.  The bladder was drained.  The bladder was  Then irrigated several times until clear without mucous.  Aprroximately 60ml of a saline and ________BETADINE_________  Solutions was left in the bladder.  The catheter was removed and patient  was asked to hold the solution in the  Bladder as long as possible      FOLLOW UP BETADINE IRRIGATION IN 1 MONTH

## 2024-10-11 ENCOUNTER — TELEPHONE (OUTPATIENT)
Dept: PRIMARY CARE | Facility: CLINIC | Age: 89
End: 2024-10-11

## 2024-10-11 DIAGNOSIS — R42 DIZZINESS: Primary | ICD-10-CM

## 2024-10-11 RX ORDER — MECLIZINE HCL 12.5 MG 12.5 MG/1
12.5 TABLET ORAL 3 TIMES DAILY PRN
Qty: 30 TABLET | Refills: 0 | Status: SHIPPED | OUTPATIENT
Start: 2024-10-11 | End: 2024-10-21

## 2024-10-11 NOTE — TELEPHONE ENCOUNTER
DWAINE CALLED [facility] TO CHECK ON HIS PILL AND THEY SAID IT NEEDS A PRIOR AUTH. THANK YOU. DWAINE WOULD LIKE A CALL BACK.

## 2024-10-11 NOTE — TELEPHONE ENCOUNTER
Patient fell out of chair, called 911 and when they got there they helped him back into his chair. They said he was experiencing vertigo. They offered to take him to the hospital, and he refused. They told him he needed to call his provider. Please call patient to discuss his options. There are no openings until at least the 18th. Please advise and call patient to discuss.

## 2024-10-16 ENCOUNTER — APPOINTMENT (OUTPATIENT)
Dept: UROLOGY | Facility: CLINIC | Age: 89
End: 2024-10-16
Payer: MEDICARE

## 2024-10-23 ENCOUNTER — APPOINTMENT (OUTPATIENT)
Dept: UROLOGY | Facility: CLINIC | Age: 89
End: 2024-10-23
Payer: MEDICARE

## 2024-10-23 DIAGNOSIS — N39.0 RECURRENT UTI: ICD-10-CM

## 2024-10-23 PROCEDURE — 51700 IRRIGATION OF BLADDER: CPT | Performed by: UROLOGY

## 2024-10-23 PROCEDURE — 1036F TOBACCO NON-USER: CPT | Performed by: UROLOGY

## 2024-10-23 PROCEDURE — 1159F MED LIST DOCD IN RCRD: CPT | Performed by: UROLOGY

## 2024-10-29 ENCOUNTER — APPOINTMENT (OUTPATIENT)
Dept: PRIMARY CARE | Facility: CLINIC | Age: 89
End: 2024-10-29
Payer: MEDICARE

## 2024-10-30 ENCOUNTER — APPOINTMENT (OUTPATIENT)
Dept: RADIOLOGY | Facility: HOSPITAL | Age: 89
End: 2024-10-30
Payer: MEDICARE

## 2024-10-30 ENCOUNTER — HOSPITAL ENCOUNTER (EMERGENCY)
Facility: HOSPITAL | Age: 89
Discharge: OTHER INSTITUTION | End: 2024-10-31
Attending: EMERGENCY MEDICINE
Payer: MEDICARE

## 2024-10-30 ENCOUNTER — CLINICAL SUPPORT (OUTPATIENT)
Dept: EMERGENCY MEDICINE | Facility: HOSPITAL | Age: 89
DRG: 183 | End: 2024-10-30
Payer: MEDICARE

## 2024-10-30 DIAGNOSIS — N39.0 URINARY TRACT INFECTION WITHOUT HEMATURIA, SITE UNSPECIFIED: Primary | ICD-10-CM

## 2024-10-30 DIAGNOSIS — D62 ANEMIA DUE TO ACUTE BLOOD LOSS: ICD-10-CM

## 2024-10-30 DIAGNOSIS — S27.321A CONTUSION OF LEFT LUNG, INITIAL ENCOUNTER: ICD-10-CM

## 2024-10-30 DIAGNOSIS — R53.1 GENERALIZED WEAKNESS: ICD-10-CM

## 2024-10-30 DIAGNOSIS — N17.9 AKI (ACUTE KIDNEY INJURY) (CMS-HCC): ICD-10-CM

## 2024-10-30 DIAGNOSIS — S27.0XXA TRAUMATIC PNEUMOTHORAX, INITIAL ENCOUNTER: ICD-10-CM

## 2024-10-30 DIAGNOSIS — J90 PLEURAL EFFUSION, LEFT: ICD-10-CM

## 2024-10-30 DIAGNOSIS — S22.42XA CLOSED FRACTURE OF MULTIPLE RIBS OF LEFT SIDE, INITIAL ENCOUNTER: ICD-10-CM

## 2024-10-30 DIAGNOSIS — T14.8XXA HEMATOMA: ICD-10-CM

## 2024-10-30 LAB
ALBUMIN SERPL BCP-MCNC: 3.1 G/DL (ref 3.4–5)
ALP SERPL-CCNC: 87 U/L (ref 33–136)
ALT SERPL W P-5'-P-CCNC: 27 U/L (ref 10–52)
ANION GAP SERPL CALC-SCNC: 12 MMOL/L (ref 10–20)
APPEARANCE UR: ABNORMAL
AST SERPL W P-5'-P-CCNC: 29 U/L (ref 9–39)
BACTERIA #/AREA URNS AUTO: ABNORMAL /HPF
BILIRUB SERPL-MCNC: 1.1 MG/DL (ref 0–1.2)
BILIRUB UR STRIP.AUTO-MCNC: NEGATIVE MG/DL
BUN SERPL-MCNC: 73 MG/DL (ref 6–23)
CALCIUM SERPL-MCNC: 8.6 MG/DL (ref 8.6–10.3)
CARDIAC TROPONIN I PNL SERPL HS: 18 NG/L (ref 0–20)
CHLORIDE SERPL-SCNC: 110 MMOL/L (ref 98–107)
CO2 SERPL-SCNC: 23 MMOL/L (ref 21–32)
COLOR UR: YELLOW
CREAT SERPL-MCNC: 1.87 MG/DL (ref 0.5–1.3)
EGFRCR SERPLBLD CKD-EPI 2021: 34 ML/MIN/1.73M*2
ERYTHROCYTE [DISTWIDTH] IN BLOOD BY AUTOMATED COUNT: 14.6 % (ref 11.5–14.5)
FLUAV RNA RESP QL NAA+PROBE: NOT DETECTED
FLUBV RNA RESP QL NAA+PROBE: NOT DETECTED
GLUCOSE SERPL-MCNC: 87 MG/DL (ref 74–99)
GLUCOSE UR STRIP.AUTO-MCNC: NORMAL MG/DL
HCT VFR BLD AUTO: 26.1 % (ref 41–52)
HGB BLD-MCNC: 8.2 G/DL (ref 13.5–17.5)
HOLD SPECIMEN: NORMAL
KETONES UR STRIP.AUTO-MCNC: NEGATIVE MG/DL
LACTATE SERPL-SCNC: 1.4 MMOL/L (ref 0.4–2)
LACTATE SERPL-SCNC: 2.4 MMOL/L (ref 0.4–2)
LEUKOCYTE ESTERASE UR QL STRIP.AUTO: ABNORMAL
MCH RBC QN AUTO: 29.2 PG (ref 26–34)
MCHC RBC AUTO-ENTMCNC: 31.4 G/DL (ref 32–36)
MCV RBC AUTO: 93 FL (ref 80–100)
MUCOUS THREADS #/AREA URNS AUTO: ABNORMAL /LPF
NITRITE UR QL STRIP.AUTO: NEGATIVE
NRBC BLD-RTO: 0 /100 WBCS (ref 0–0)
PH UR STRIP.AUTO: 8 [PH]
PLATELET # BLD AUTO: 208 X10*3/UL (ref 150–450)
POTASSIUM SERPL-SCNC: 4.5 MMOL/L (ref 3.5–5.3)
PROT SERPL-MCNC: 5.6 G/DL (ref 6.4–8.2)
PROT UR STRIP.AUTO-MCNC: ABNORMAL MG/DL
RBC # BLD AUTO: 2.81 X10*6/UL (ref 4.5–5.9)
RBC # UR STRIP.AUTO: NEGATIVE /UL
RBC #/AREA URNS AUTO: ABNORMAL /HPF
SARS-COV-2 RNA RESP QL NAA+PROBE: NOT DETECTED
SODIUM SERPL-SCNC: 140 MMOL/L (ref 136–145)
SP GR UR STRIP.AUTO: 1.02
UROBILINOGEN UR STRIP.AUTO-MCNC: NORMAL MG/DL
WBC # BLD AUTO: 7.8 X10*3/UL (ref 4.4–11.3)
WBC #/AREA URNS AUTO: >50 /HPF

## 2024-10-30 PROCEDURE — 87040 BLOOD CULTURE FOR BACTERIA: CPT | Mod: SAMLAB | Performed by: PHYSICIAN ASSISTANT

## 2024-10-30 PROCEDURE — 84075 ASSAY ALKALINE PHOSPHATASE: CPT | Performed by: PHYSICIAN ASSISTANT

## 2024-10-30 PROCEDURE — 36415 COLL VENOUS BLD VENIPUNCTURE: CPT | Performed by: PHYSICIAN ASSISTANT

## 2024-10-30 PROCEDURE — 96365 THER/PROPH/DIAG IV INF INIT: CPT | Performed by: EMERGENCY MEDICINE

## 2024-10-30 PROCEDURE — 87636 SARSCOV2 & INF A&B AMP PRB: CPT | Performed by: PHYSICIAN ASSISTANT

## 2024-10-30 PROCEDURE — 70450 CT HEAD/BRAIN W/O DYE: CPT

## 2024-10-30 PROCEDURE — 71045 X-RAY EXAM CHEST 1 VIEW: CPT | Mod: FOREIGN READ | Performed by: RADIOLOGY

## 2024-10-30 PROCEDURE — 2500000005 HC RX 250 GENERAL PHARMACY W/O HCPCS: Performed by: PHYSICIAN ASSISTANT

## 2024-10-30 PROCEDURE — 2500000004 HC RX 250 GENERAL PHARMACY W/ HCPCS (ALT 636 FOR OP/ED): Performed by: PHYSICIAN ASSISTANT

## 2024-10-30 PROCEDURE — 71250 CT THORAX DX C-: CPT | Mod: FOREIGN READ | Performed by: RADIOLOGY

## 2024-10-30 PROCEDURE — 72128 CT CHEST SPINE W/O DYE: CPT | Mod: RCN

## 2024-10-30 PROCEDURE — 87075 CULTR BACTERIA EXCEPT BLOOD: CPT | Mod: 59,SAMLAB | Performed by: PHYSICIAN ASSISTANT

## 2024-10-30 PROCEDURE — 85027 COMPLETE CBC AUTOMATED: CPT | Performed by: PHYSICIAN ASSISTANT

## 2024-10-30 PROCEDURE — 74176 CT ABD & PELVIS W/O CONTRAST: CPT | Mod: FOREIGN READ | Performed by: RADIOLOGY

## 2024-10-30 PROCEDURE — 96361 HYDRATE IV INFUSION ADD-ON: CPT | Performed by: EMERGENCY MEDICINE

## 2024-10-30 PROCEDURE — 87086 URINE CULTURE/COLONY COUNT: CPT | Mod: SAMLAB | Performed by: PHYSICIAN ASSISTANT

## 2024-10-30 PROCEDURE — 83605 ASSAY OF LACTIC ACID: CPT | Performed by: PHYSICIAN ASSISTANT

## 2024-10-30 PROCEDURE — 84484 ASSAY OF TROPONIN QUANT: CPT | Performed by: PHYSICIAN ASSISTANT

## 2024-10-30 PROCEDURE — 72125 CT NECK SPINE W/O DYE: CPT

## 2024-10-30 PROCEDURE — 71045 X-RAY EXAM CHEST 1 VIEW: CPT

## 2024-10-30 PROCEDURE — 99285 EMERGENCY DEPT VISIT HI MDM: CPT | Mod: 25 | Performed by: EMERGENCY MEDICINE

## 2024-10-30 PROCEDURE — 93005 ELECTROCARDIOGRAM TRACING: CPT

## 2024-10-30 PROCEDURE — 81003 URINALYSIS AUTO W/O SCOPE: CPT | Performed by: PHYSICIAN ASSISTANT

## 2024-10-30 PROCEDURE — 71250 CT THORAX DX C-: CPT

## 2024-10-30 RX ORDER — CEFTRIAXONE 1 G/50ML
1 INJECTION, SOLUTION INTRAVENOUS ONCE
Status: COMPLETED | OUTPATIENT
Start: 2024-10-30 | End: 2024-10-30

## 2024-10-30 ASSESSMENT — PAIN DESCRIPTION - LOCATION: LOCATION: HEAD

## 2024-10-30 ASSESSMENT — PAIN SCALES - GENERAL
PAINLEVEL_OUTOF10: 4
PAINLEVEL_OUTOF10: 8

## 2024-10-30 ASSESSMENT — PAIN - FUNCTIONAL ASSESSMENT: PAIN_FUNCTIONAL_ASSESSMENT: 0-10

## 2024-10-31 ENCOUNTER — HOSPITAL ENCOUNTER (INPATIENT)
Facility: HOSPITAL | Age: 89
End: 2024-10-31
Attending: EMERGENCY MEDICINE | Admitting: STUDENT IN AN ORGANIZED HEALTH CARE EDUCATION/TRAINING PROGRAM
Payer: MEDICARE

## 2024-10-31 ENCOUNTER — APPOINTMENT (OUTPATIENT)
Dept: RADIOLOGY | Facility: HOSPITAL | Age: 89
DRG: 183 | End: 2024-10-31
Payer: MEDICARE

## 2024-10-31 VITALS
HEIGHT: 66 IN | RESPIRATION RATE: 20 BRPM | TEMPERATURE: 97.8 F | OXYGEN SATURATION: 100 % | WEIGHT: 145 LBS | BODY MASS INDEX: 23.3 KG/M2 | HEART RATE: 97 BPM | DIASTOLIC BLOOD PRESSURE: 84 MMHG | SYSTOLIC BLOOD PRESSURE: 133 MMHG

## 2024-10-31 DIAGNOSIS — S22.42XA CLOSED FRACTURE OF MULTIPLE RIBS OF LEFT SIDE, INITIAL ENCOUNTER: ICD-10-CM

## 2024-10-31 DIAGNOSIS — N39.0 RECURRENT UTI: ICD-10-CM

## 2024-10-31 DIAGNOSIS — S32.010A CLOSED COMPRESSION FRACTURE OF BODY OF L1 VERTEBRA (MULTI): ICD-10-CM

## 2024-10-31 DIAGNOSIS — J94.2 HEMOPNEUMOTHORAX ON LEFT: Primary | ICD-10-CM

## 2024-10-31 DIAGNOSIS — R41.82 ALTERED MENTAL STATUS, UNSPECIFIED ALTERED MENTAL STATUS TYPE: ICD-10-CM

## 2024-10-31 DIAGNOSIS — R55 SYNCOPE, UNSPECIFIED SYNCOPE TYPE: ICD-10-CM

## 2024-10-31 DIAGNOSIS — R09.02 HYPOXIA: ICD-10-CM

## 2024-10-31 LAB
ABO GROUP (TYPE) IN BLOOD: NORMAL
ABO GROUP (TYPE) IN BLOOD: NORMAL
ALBUMIN SERPL BCP-MCNC: 2.3 G/DL (ref 3.4–5)
ANION GAP SERPL CALC-SCNC: 11 MMOL/L (ref 10–20)
ANTIBODY SCREEN: NORMAL
BASOPHILS # BLD AUTO: 0.01 X10*3/UL (ref 0–0.1)
BASOPHILS NFR BLD AUTO: 0.1 %
BUN SERPL-MCNC: 49 MG/DL (ref 6–23)
CA-I BLD-SCNC: 1.13 MMOL/L (ref 1.1–1.33)
CALCIUM SERPL-MCNC: 6.7 MG/DL (ref 8.6–10.6)
CHLORIDE SERPL-SCNC: 116 MMOL/L (ref 98–107)
CO2 SERPL-SCNC: 20 MMOL/L (ref 21–32)
CREAT SERPL-MCNC: 1.18 MG/DL (ref 0.5–1.3)
EGFRCR SERPLBLD CKD-EPI 2021: 60 ML/MIN/1.73M*2
EOSINOPHIL # BLD AUTO: 0.01 X10*3/UL (ref 0–0.4)
EOSINOPHIL NFR BLD AUTO: 0.1 %
ERYTHROCYTE [DISTWIDTH] IN BLOOD BY AUTOMATED COUNT: 14.4 % (ref 11.5–14.5)
ERYTHROCYTE [DISTWIDTH] IN BLOOD BY AUTOMATED COUNT: 14.9 % (ref 11.5–14.5)
GLUCOSE BLD MANUAL STRIP-MCNC: 81 MG/DL (ref 74–99)
GLUCOSE BLD MANUAL STRIP-MCNC: 87 MG/DL (ref 74–99)
GLUCOSE BLD MANUAL STRIP-MCNC: 91 MG/DL (ref 74–99)
GLUCOSE SERPL-MCNC: 94 MG/DL (ref 74–99)
HCT VFR BLD AUTO: 22.1 % (ref 41–52)
HCT VFR BLD AUTO: 26.7 % (ref 41–52)
HGB BLD-MCNC: 7.6 G/DL (ref 13.5–17.5)
HGB BLD-MCNC: 8.6 G/DL (ref 13.5–17.5)
IMM GRANULOCYTES # BLD AUTO: 0.15 X10*3/UL (ref 0–0.5)
IMM GRANULOCYTES NFR BLD AUTO: 1.5 % (ref 0–0.9)
LYMPHOCYTES # BLD AUTO: 0.22 X10*3/UL (ref 0.8–3)
LYMPHOCYTES NFR BLD AUTO: 2.2 %
MAGNESIUM SERPL-MCNC: 2.16 MG/DL (ref 1.6–2.4)
MCH RBC QN AUTO: 29.1 PG (ref 26–34)
MCH RBC QN AUTO: 29.2 PG (ref 26–34)
MCHC RBC AUTO-ENTMCNC: 32.2 G/DL (ref 32–36)
MCHC RBC AUTO-ENTMCNC: 34.4 G/DL (ref 32–36)
MCV RBC AUTO: 85 FL (ref 80–100)
MCV RBC AUTO: 91 FL (ref 80–100)
MONOCYTES # BLD AUTO: 0.29 X10*3/UL (ref 0.05–0.8)
MONOCYTES NFR BLD AUTO: 2.9 %
NEUTROPHILS # BLD AUTO: 9.47 X10*3/UL (ref 1.6–5.5)
NEUTROPHILS NFR BLD AUTO: 93.2 %
NRBC BLD-RTO: 0 /100 WBCS (ref 0–0)
NRBC BLD-RTO: 0 /100 WBCS (ref 0–0)
PHOSPHATE SERPL-MCNC: 3 MG/DL (ref 2.5–4.9)
PLATELET # BLD AUTO: 192 X10*3/UL (ref 150–450)
PLATELET # BLD AUTO: 236 X10*3/UL (ref 150–450)
POTASSIUM SERPL-SCNC: 3.5 MMOL/L (ref 3.5–5.3)
RBC # BLD AUTO: 2.61 X10*6/UL (ref 4.5–5.9)
RBC # BLD AUTO: 2.95 X10*6/UL (ref 4.5–5.9)
RH FACTOR (ANTIGEN D): NORMAL
RH FACTOR (ANTIGEN D): NORMAL
SODIUM SERPL-SCNC: 143 MMOL/L (ref 136–145)
WBC # BLD AUTO: 10.2 X10*3/UL (ref 4.4–11.3)
WBC # BLD AUTO: 11.3 X10*3/UL (ref 4.4–11.3)

## 2024-10-31 PROCEDURE — 72125 CT NECK SPINE W/O DYE: CPT | Performed by: RADIOLOGY

## 2024-10-31 PROCEDURE — 82947 ASSAY GLUCOSE BLOOD QUANT: CPT

## 2024-10-31 PROCEDURE — 2500000001 HC RX 250 WO HCPCS SELF ADMINISTERED DRUGS (ALT 637 FOR MEDICARE OP)

## 2024-10-31 PROCEDURE — 2500000004 HC RX 250 GENERAL PHARMACY W/ HCPCS (ALT 636 FOR OP/ED)

## 2024-10-31 PROCEDURE — 99285 EMERGENCY DEPT VISIT HI MDM: CPT | Performed by: EMERGENCY MEDICINE

## 2024-10-31 PROCEDURE — 71045 X-RAY EXAM CHEST 1 VIEW: CPT | Performed by: RADIOLOGY

## 2024-10-31 PROCEDURE — 93010 ELECTROCARDIOGRAM REPORT: CPT | Performed by: EMERGENCY MEDICINE

## 2024-10-31 PROCEDURE — 96375 TX/PRO/DX INJ NEW DRUG ADDON: CPT

## 2024-10-31 PROCEDURE — 73110 X-RAY EXAM OF WRIST: CPT | Mod: LT

## 2024-10-31 PROCEDURE — 2500000005 HC RX 250 GENERAL PHARMACY W/O HCPCS

## 2024-10-31 PROCEDURE — G0390 TRAUMA RESPONS W/HOSP CRITI: HCPCS

## 2024-10-31 PROCEDURE — 72128 CT CHEST SPINE W/O DYE: CPT | Mod: RCN | Performed by: RADIOLOGY

## 2024-10-31 PROCEDURE — 85025 COMPLETE CBC W/AUTO DIFF WBC: CPT

## 2024-10-31 PROCEDURE — 73130 X-RAY EXAM OF HAND: CPT | Mod: LEFT SIDE | Performed by: RADIOLOGY

## 2024-10-31 PROCEDURE — 2500000004 HC RX 250 GENERAL PHARMACY W/ HCPCS (ALT 636 FOR OP/ED): Mod: JZ

## 2024-10-31 PROCEDURE — 99285 EMERGENCY DEPT VISIT HI MDM: CPT | Mod: 25

## 2024-10-31 PROCEDURE — 85027 COMPLETE CBC AUTOMATED: CPT | Performed by: STUDENT IN AN ORGANIZED HEALTH CARE EDUCATION/TRAINING PROGRAM

## 2024-10-31 PROCEDURE — 70450 CT HEAD/BRAIN W/O DYE: CPT | Performed by: RADIOLOGY

## 2024-10-31 PROCEDURE — 96366 THER/PROPH/DIAG IV INF ADDON: CPT

## 2024-10-31 PROCEDURE — 96365 THER/PROPH/DIAG IV INF INIT: CPT

## 2024-10-31 PROCEDURE — 2500000004 HC RX 250 GENERAL PHARMACY W/ HCPCS (ALT 636 FOR OP/ED): Performed by: STUDENT IN AN ORGANIZED HEALTH CARE EDUCATION/TRAINING PROGRAM

## 2024-10-31 PROCEDURE — 32551 INSERTION OF CHEST TUBE: CPT | Performed by: STUDENT IN AN ORGANIZED HEALTH CARE EDUCATION/TRAINING PROGRAM

## 2024-10-31 PROCEDURE — 36415 COLL VENOUS BLD VENIPUNCTURE: CPT

## 2024-10-31 PROCEDURE — 80069 RENAL FUNCTION PANEL: CPT

## 2024-10-31 PROCEDURE — 99223 1ST HOSP IP/OBS HIGH 75: CPT

## 2024-10-31 PROCEDURE — 71045 X-RAY EXAM CHEST 1 VIEW: CPT

## 2024-10-31 PROCEDURE — 73130 X-RAY EXAM OF HAND: CPT | Mod: LT

## 2024-10-31 PROCEDURE — 73110 X-RAY EXAM OF WRIST: CPT | Mod: LEFT SIDE | Performed by: RADIOLOGY

## 2024-10-31 PROCEDURE — 99221 1ST HOSP IP/OBS SF/LOW 40: CPT | Performed by: ORTHOPAEDIC SURGERY

## 2024-10-31 PROCEDURE — 99291 CRITICAL CARE FIRST HOUR: CPT | Performed by: STUDENT IN AN ORGANIZED HEALTH CARE EDUCATION/TRAINING PROGRAM

## 2024-10-31 PROCEDURE — 2500000001 HC RX 250 WO HCPCS SELF ADMINISTERED DRUGS (ALT 637 FOR MEDICARE OP): Performed by: STUDENT IN AN ORGANIZED HEALTH CARE EDUCATION/TRAINING PROGRAM

## 2024-10-31 PROCEDURE — 1100000001 HC PRIVATE ROOM DAILY

## 2024-10-31 PROCEDURE — 86901 BLOOD TYPING SEROLOGIC RH(D): CPT | Performed by: STUDENT IN AN ORGANIZED HEALTH CARE EDUCATION/TRAINING PROGRAM

## 2024-10-31 PROCEDURE — 72131 CT LUMBAR SPINE W/O DYE: CPT

## 2024-10-31 PROCEDURE — 83735 ASSAY OF MAGNESIUM: CPT

## 2024-10-31 PROCEDURE — 86900 BLOOD TYPING SEROLOGIC ABO: CPT | Performed by: STUDENT IN AN ORGANIZED HEALTH CARE EDUCATION/TRAINING PROGRAM

## 2024-10-31 PROCEDURE — 99223 1ST HOSP IP/OBS HIGH 75: CPT | Performed by: STUDENT IN AN ORGANIZED HEALTH CARE EDUCATION/TRAINING PROGRAM

## 2024-10-31 PROCEDURE — 82330 ASSAY OF CALCIUM: CPT

## 2024-10-31 PROCEDURE — 0W9B30Z DRAINAGE OF LEFT PLEURAL CAVITY WITH DRAINAGE DEVICE, PERCUTANEOUS APPROACH: ICD-10-PCS | Performed by: STUDENT IN AN ORGANIZED HEALTH CARE EDUCATION/TRAINING PROGRAM

## 2024-10-31 RX ORDER — OXYCODONE HYDROCHLORIDE 10 MG/1
10 TABLET ORAL EVERY 4 HOURS PRN
Status: DISCONTINUED | OUTPATIENT
Start: 2024-10-31 | End: 2024-11-01

## 2024-10-31 RX ORDER — POLYETHYLENE GLYCOL 3350 17 G/17G
17 POWDER, FOR SOLUTION ORAL DAILY
Status: DISPENSED | OUTPATIENT
Start: 2024-10-31

## 2024-10-31 RX ORDER — METOPROLOL TARTRATE 25 MG/1
12.5 TABLET, FILM COATED ORAL 2 TIMES DAILY
Status: DISPENSED | OUTPATIENT
Start: 2024-10-31

## 2024-10-31 RX ORDER — CALCIUM GLUCONATE 20 MG/ML
1 INJECTION, SOLUTION INTRAVENOUS ONCE
Status: COMPLETED | OUTPATIENT
Start: 2024-10-31 | End: 2024-10-31

## 2024-10-31 RX ORDER — OXYCODONE HYDROCHLORIDE 5 MG/1
5 TABLET ORAL EVERY 4 HOURS PRN
Status: DISCONTINUED | OUTPATIENT
Start: 2024-10-31 | End: 2024-11-01

## 2024-10-31 RX ORDER — LEVOFLOXACIN 5 MG/ML
250 INJECTION, SOLUTION INTRAVENOUS EVERY 24 HOURS
Status: DISCONTINUED | OUTPATIENT
Start: 2024-10-31 | End: 2024-11-03

## 2024-10-31 RX ORDER — LIDOCAINE 560 MG/1
1 PATCH PERCUTANEOUS; TOPICAL; TRANSDERMAL DAILY
Status: DISPENSED | OUTPATIENT
Start: 2024-10-31

## 2024-10-31 RX ORDER — HYDROMORPHONE HYDROCHLORIDE 1 MG/ML
0.5 INJECTION, SOLUTION INTRAMUSCULAR; INTRAVENOUS; SUBCUTANEOUS ONCE
Status: COMPLETED | OUTPATIENT
Start: 2024-10-31 | End: 2024-10-31

## 2024-10-31 RX ORDER — METHOCARBAMOL 500 MG/1
500 TABLET, FILM COATED ORAL EVERY 8 HOURS SCHEDULED
Status: DISCONTINUED | OUTPATIENT
Start: 2024-10-31 | End: 2024-11-02

## 2024-10-31 RX ORDER — HYDROMORPHONE HYDROCHLORIDE 0.2 MG/ML
0.2 INJECTION INTRAMUSCULAR; INTRAVENOUS; SUBCUTANEOUS
Status: DISPENSED | OUTPATIENT
Start: 2024-10-31

## 2024-10-31 RX ORDER — INSULIN LISPRO 100 [IU]/ML
0-5 INJECTION, SOLUTION INTRAVENOUS; SUBCUTANEOUS
Status: DISCONTINUED | OUTPATIENT
Start: 2024-10-31 | End: 2024-11-03

## 2024-10-31 RX ORDER — TAMSULOSIN HYDROCHLORIDE 0.4 MG/1
0.4 CAPSULE ORAL DAILY
Status: DISPENSED | OUTPATIENT
Start: 2024-11-01

## 2024-10-31 RX ORDER — LIDOCAINE HYDROCHLORIDE 10 MG/ML
INJECTION, SOLUTION INFILTRATION; PERINEURAL
Status: COMPLETED
Start: 2024-10-31 | End: 2024-10-31

## 2024-10-31 RX ORDER — SODIUM CHLORIDE, SODIUM LACTATE, POTASSIUM CHLORIDE, CALCIUM CHLORIDE 600; 310; 30; 20 MG/100ML; MG/100ML; MG/100ML; MG/100ML
75 INJECTION, SOLUTION INTRAVENOUS CONTINUOUS
Status: DISCONTINUED | OUTPATIENT
Start: 2024-10-31 | End: 2024-11-01

## 2024-10-31 RX ORDER — HYDROXYZINE HYDROCHLORIDE 25 MG/1
25 TABLET, FILM COATED ORAL ONCE AS NEEDED
Status: COMPLETED | OUTPATIENT
Start: 2024-10-31 | End: 2024-10-31

## 2024-10-31 RX ORDER — ACETAMINOPHEN 325 MG/1
650 TABLET ORAL EVERY 6 HOURS
Status: DISPENSED | OUTPATIENT
Start: 2024-10-31

## 2024-10-31 RX ADMIN — HYDROXYZINE HYDROCHLORIDE 25 MG: 25 TABLET, FILM COATED ORAL at 19:45

## 2024-10-31 RX ADMIN — ACETAMINOPHEN 650 MG: 325 TABLET, FILM COATED ORAL at 21:12

## 2024-10-31 RX ADMIN — METOPROLOL TARTRATE 12.5 MG: 25 TABLET, FILM COATED ORAL at 21:12

## 2024-10-31 RX ADMIN — ACETAMINOPHEN 650 MG: 325 TABLET, FILM COATED ORAL at 11:50

## 2024-10-31 RX ADMIN — METHOCARBAMOL 500 MG: 500 TABLET ORAL at 11:50

## 2024-10-31 RX ADMIN — HYDROMORPHONE HYDROCHLORIDE 0.5 MG: 1 INJECTION, SOLUTION INTRAMUSCULAR; INTRAVENOUS; SUBCUTANEOUS at 06:30

## 2024-10-31 RX ADMIN — HYDROMORPHONE HYDROCHLORIDE 0.2 MG: 0.2 INJECTION, SOLUTION INTRAMUSCULAR; INTRAVENOUS; SUBCUTANEOUS at 16:27

## 2024-10-31 RX ADMIN — CALCIUM GLUCONATE 1 G: 20 INJECTION, SOLUTION INTRAVENOUS at 21:12

## 2024-10-31 RX ADMIN — LIDOCAINE 1 PATCH: 4 PATCH TOPICAL at 11:50

## 2024-10-31 RX ADMIN — OXYCODONE HYDROCHLORIDE 10 MG: 5 TABLET ORAL at 13:06

## 2024-10-31 RX ADMIN — LEVOFLOXACIN 250 MG: 250 INJECTION, SOLUTION INTRAVENOUS at 11:50

## 2024-10-31 RX ADMIN — METHOCARBAMOL 500 MG: 500 TABLET ORAL at 21:12

## 2024-10-31 RX ADMIN — LIDOCAINE HYDROCHLORIDE: 10 INJECTION, SOLUTION INFILTRATION; PERINEURAL at 09:09

## 2024-10-31 ASSESSMENT — PAIN - FUNCTIONAL ASSESSMENT
PAIN_FUNCTIONAL_ASSESSMENT: 0-10

## 2024-10-31 ASSESSMENT — PAIN DESCRIPTION - ORIENTATION: ORIENTATION: LEFT

## 2024-10-31 ASSESSMENT — PAIN SCALES - GENERAL
PAINLEVEL_OUTOF10: 0 - NO PAIN
PAINLEVEL_OUTOF10: 6
PAINLEVEL_OUTOF10: 3
PAINLEVEL_OUTOF10: 5 - MODERATE PAIN
PAINLEVEL_OUTOF10: 5 - MODERATE PAIN
PAINLEVEL_OUTOF10: 8
PAINLEVEL_OUTOF10: 10 - WORST POSSIBLE PAIN
PAINLEVEL_OUTOF10: 6

## 2024-10-31 ASSESSMENT — COLUMBIA-SUICIDE SEVERITY RATING SCALE - C-SSRS
2. HAVE YOU ACTUALLY HAD ANY THOUGHTS OF KILLING YOURSELF?: NO
6. HAVE YOU EVER DONE ANYTHING, STARTED TO DO ANYTHING, OR PREPARED TO DO ANYTHING TO END YOUR LIFE?: NO
1. IN THE PAST MONTH, HAVE YOU WISHED YOU WERE DEAD OR WISHED YOU COULD GO TO SLEEP AND NOT WAKE UP?: NO

## 2024-10-31 ASSESSMENT — PAIN DESCRIPTION - LOCATION
LOCATION: HIP
LOCATION: ABDOMEN

## 2024-10-31 ASSESSMENT — COGNITIVE AND FUNCTIONAL STATUS - GENERAL: PATIENT BASELINE BEDBOUND: NO

## 2024-10-31 NOTE — CONSULTS
Tommy Richmond is a 89 y.o. male with a past medical history of hypertension, CKD stage III, recurrent UTIs, and BPH that presents the emergency department today as a transfer from [facility] where he presented for multiple falls and was found to have left-sided rib fractures 5-7 with a large chest wall hematoma and pulmonary contusions. Acute Pain consulted for assistance with pain control.     Anticipated Postop Pain Issues -   Palliative: typically relieved with IV analgesics and regional local anesthetics  Provocative: typically with movement  Quality: typically burning and aching  Radiation: typically none  Severity: typically severe 8-10/10  Timing: typically constant    Past Medical History:   Diagnosis Date    Personal history of other diseases of urinary system     History of bladder stone        Past Surgical History:   Procedure Laterality Date    OTHER SURGICAL HISTORY  10/25/2019    Inguinal hernia repair    OTHER SURGICAL HISTORY  10/25/2019    Small bowel resection    OTHER SURGICAL HISTORY  10/25/2019    Colonoscopy    OTHER SURGICAL HISTORY  10/25/2019    Cystoscopy    OTHER SURGICAL HISTORY  10/25/2019    Tonsillectomy        Family History   Problem Relation Name Age of Onset    Colon cancer Father      Prostate cancer Father      Colon cancer Sister      Prostate cancer Brother          Social History     Socioeconomic History    Marital status:      Spouse name: Not on file    Number of children: Not on file    Years of education: Not on file    Highest education level: Not on file   Occupational History    Not on file   Tobacco Use    Smoking status: Former     Types: Cigarettes    Smokeless tobacco: Never   Vaping Use    Vaping status: Never Used   Substance and Sexual Activity    Alcohol use: Never    Drug use: Never    Sexual activity: Not on file   Other Topics Concern    Not on file   Social History Narrative    Not on file     Social Drivers of Health     Financial Resource  Strain: Low Risk  (7/6/2024)    Overall Financial Resource Strain (CARDIA)     Difficulty of Paying Living Expenses: Not very hard   Food Insecurity: Not on file   Transportation Needs: No Transportation Needs (8/28/2024)    OASIS : Transportation     Lack of Transportation (Medical): No     Lack of Transportation (Non-Medical): No     Patient Unable or Declines to Respond: No   Physical Activity: Not on file   Stress: Not on file   Social Connections: Feeling Socially Integrated (8/28/2024)    OASIS : Social Isolation     Frequency of experiencing loneliness or isolation: Never   Intimate Partner Violence: Not on file   Housing Stability: Low Risk  (7/6/2024)    Housing Stability Vital Sign     Unable to Pay for Housing in the Last Year: No     Number of Places Lived in the Last Year: 1     Unstable Housing in the Last Year: No        Allergies   Allergen Reactions    Iodinated Contrast Media Unknown    Penicillins Rash, Hives and Unknown         Review of Systems  Gen: No fatigue, anorexia, insomnia, fever.   Eyes: No vision loss, double vision, drainage, eye pain.   ENT: No pharyngitis, dry mouth, no hearing changes or ear discharge  Cardiac: No chest pain, palpitations, syncope, near syncope.   Pulmonary: No shortness of breath, cough, hemoptysis.  Heme/lymph: No swollen glands, fever, bleeding.   GI: No abdominal pain, change in bowel habits, melena, hematemesis, hematochezia, nausea, vomiting, diarrhea.   : No discharge, dysuria, frequency, urgency, hematuria.  Endo: No polyuria or weight loss.   Musculoskeletal: Negative for any pain or loss of ROM/weakness  Skin: No rashes or lesions  Neuro: Normal speech, no numbness or weakness. No gait difficulties  Review of systems is otherwise negative unless stated above or in history of present illness.    Physical Exam:  Constitutional:  no distress, alert and cooperative  Eyes: clear sclera  Head/Neck: No apparent injury, trachea  midline  Respiratory/Thorax: Patent airways, thorax symmetric, breathing shallow. Large posterior L chest wall hematoma.  Cardiovascular: no pitting edema  Gastrointestinal: Nondistended  Musculoskeletal: ROM intact  Extremities: no clubbing  Neurological: alert, escalante x4  Psychological: Appropriate affect    Results for orders placed or performed during the hospital encounter of 10/31/24 (from the past 24 hours)   CBC and Auto Differential   Result Value Ref Range    WBC 10.2 4.4 - 11.3 x10*3/uL    nRBC 0.0 0.0 - 0.0 /100 WBCs    RBC 2.61 (L) 4.50 - 5.90 x10*6/uL    Hemoglobin 7.6 (L) 13.5 - 17.5 g/dL    Hematocrit 22.1 (L) 41.0 - 52.0 %    MCV 85 80 - 100 fL    MCH 29.1 26.0 - 34.0 pg    MCHC 34.4 32.0 - 36.0 g/dL    RDW 14.4 11.5 - 14.5 %    Platelets 192 150 - 450 x10*3/uL    Neutrophils % 93.2 40.0 - 80.0 %    Immature Granulocytes %, Automated 1.5 (H) 0.0 - 0.9 %    Lymphocytes % 2.2 13.0 - 44.0 %    Monocytes % 2.9 2.0 - 10.0 %    Eosinophils % 0.1 0.0 - 6.0 %    Basophils % 0.1 0.0 - 2.0 %    Neutrophils Absolute 9.47 (H) 1.60 - 5.50 x10*3/uL    Immature Granulocytes Absolute, Automated 0.15 0.00 - 0.50 x10*3/uL    Lymphocytes Absolute 0.22 (L) 0.80 - 3.00 x10*3/uL    Monocytes Absolute 0.29 0.05 - 0.80 x10*3/uL    Eosinophils Absolute 0.01 0.00 - 0.40 x10*3/uL    Basophils Absolute 0.01 0.00 - 0.10 x10*3/uL        Tommy Richmond is a 89 y.o. year old male patient who presents for multiple rib fx after a fall and L chest wall hematoma with Teodoro Wilkerson MD on 10/31/2024. Acute Pain consulted for assistance with pain control.     Plan:  - Unfortunately the patient is not a candidate for a block at this time due to his large posterior chest wall hematoma  - IV Toradol 30mg Q6H for 6 doses per surgical service  - IV Mg 2g Q8H x 3 doses  - IV Robaxin 1000mg QID  - IV dilaudid 0.2mg Q4H for breakthrough pain  - IV Tylenol 1000mg Q6H x 4 doses  - Ketamine 5 mg/hr for 24hrs   - Lidocaine 50 mg/hr, check  lidocaine level after 6 hrs  - Precedex 0.1 mcg/kg/min for 24 hrs  Please draw a lidocaine level 6 hours after starting the infusion, from a different IV than the line that the lidocaine is infusing into. After that first level, you will have to draw lidocaine levels q24h.   - Continuous pulse ox  - Will see patient tomorrow AM and continue to follow.     Acute Pain Resident  pg [phone] ph [phone]     Jeanie Jacobs, DO

## 2024-10-31 NOTE — PROCEDURES
Chest Tube Insertion    Date/Time: 10/31/2024 1:13 PM    Performed by: Mark Humphries MD  Authorized by: Teodoro Wilkerson MD    Consent:     Consent obtained:  Written    Consent given by: brother.    Risks, benefits, and alternatives were discussed: yes      Risks discussed:  Bleeding, incomplete drainage, damage to surrounding structures, infection and pain    Alternatives discussed:  No treatment, delayed treatment, alternative treatment and observation  Universal protocol:     Procedure explained and questions answered to patient or proxy's satisfaction: yes      Required blood products, implants, devices, and special equipment available: yes      Site/side marked: yes      Immediately prior to procedure, a time out was called: yes      Patient identity confirmed:  Verbally with patient and arm band  Pre-procedure details:     Skin preparation:  Chlorhexidine    Preparation: Patient was prepped and draped in the usual sterile fashion    Sedation:     Sedation type:  None  Anesthesia:     Anesthesia method:  Local infiltration    Local anesthetic:  Lidocaine 1% w/o epi  Procedure details:     Placement location:  L lateral    Scalpel size:  11    Tube size (Andorran): 14.    Ultrasound guidance: no      Tension pneumothorax: no      Tube connected to:  Suction    Drainage characteristics:  Serosanguinous    Suture material:  2-0 silk    Dressing:  4x4 sterile gauze  Post-procedure details:     Post-insertion x-ray findings: tube in good position      Procedure completion:  Tolerated well, no immediate complications  Comments:      14Fr pigtail chest tube placed using modified seldinger technique with successful placement, verified on chest xray.

## 2024-10-31 NOTE — H&P
[facility]  TRAUMA SERVICE - HISTORY AND PHYSICAL / CONSULT    Patient Name: Tommy Richmond  MRN: [id number]  Admit Date: 1031  : 1935  AGE: 89 y.o.   GENDER: male  ==============================================================================  MECHANISM OF INJURY / CHIEF COMPLAINT:   89-year-old male who presented after a ground-level fall of unknown mechanism. Due to patient's confusion, history obtained via chart review and conversation w/ patient's son.   LOC (yes/no?): No  Anticoagulant / Anti-platelet Rx? (for what dx?): No  Referring Facility Name (N/A for scene EMR run): [facility]    INJURIES:   Left Hemo/Pneumothorax  L1 Compression fracture  Fracture of left ribs 5-7    OTHER MEDICAL PROBLEMS:  Recurrent UTIs with stage III Kidney disease  HTN  Glaucoma  BPH    INCIDENTAL FINDINGS:  Complex kidney cyst  Cholelithiasis    ==============================================================================  ADMISSION PLAN OF CARE:  88 y/o male w/ PMHx of Chronic UTIs, HTN, BPH who presented as a transfer after an unknown fall at home.  Per patient's son, patient has recurrent urinary tract infections that result in confusion and falls before resolution.  Approximately 1 week ago, patient's son began to notice increased confusion.  Twice yesterday, the patient fell at home with his wife.  The patient was then brought to [facility] emergency department for evaluation.  The patient is confused and is uncertain if he hit his head, but he does not endorse any injuries besides feeling pain in his chest and back and shortness of breath.    #Left hemopneumothorax  - Placement of pigtail chest tube by Trauma Surgery today  - Multimodal pain regimen    #L1 compression fracture  - Ortho spine consult.     #Rib fractures  - IS 10x/hr  - Monitor O2 saturatoins    #Dispo  - Will need admission to Trauma Surgery, pending discussion of floor vs ICU    Plans discussed with   Rhea, attending  Roger Gaytan MD  PGY-1  Trauma Surgery  Available via [employer]      Consultants notified (specialty, provider name, time): Ortho Spine, paged 10:30AM    ==============================================================================  PAST MEDICAL HISTORY:   PMH: chronic UTIs, CKD stage III, glaucoma  Past Medical History:   Diagnosis Date    Personal history of other diseases of urinary system     History of bladder stone       PSH: Patient and son unable to provide history, as below  Past Surgical History:   Procedure Laterality Date    OTHER SURGICAL HISTORY  10/25/2019    Inguinal hernia repair    OTHER SURGICAL HISTORY  10/25/2019    Small bowel resection    OTHER SURGICAL HISTORY  10/25/2019    Colonoscopy    OTHER SURGICAL HISTORY  10/25/2019    Cystoscopy    OTHER SURGICAL HISTORY  10/25/2019    Tonsillectomy     FH: As below  Family History   Problem Relation Name Age of Onset    Colon cancer Father      Prostate cancer Father      Colon cancer Sister      Prostate cancer Brother       SOCIAL HISTORY:    Smoking: Never   Social History     Tobacco Use   Smoking Status Former    Types: Cigarettes   Smokeless Tobacco Never       Alcohol: Never   Social History     Substance and Sexual Activity   Alcohol Use Never       Drug use: None    MEDICATIONS: As below  Prior to Admission medications    Medication Sig Start Date End Date Taking? Authorizing Provider   alendronate (Fosamax) 70 mg tablet Take 1 tablet (70 mg) by mouth every 7 days. Take in the morning with a full glass of water, on an empty stomach, and do not take anything else by mouth or lie down for the next 30 min. 2/2/24 2/1/25  Brandan Mcclendon MD MPH   ammonium lactate (Lac-Hydrin) 12 % lotion 1 Application once daily as needed. To lower legs and feet. 2/14/24   Historical Provider, MD   finasteride (Proscar) 5 mg tablet TAKE 1 TABLET EVERY DAY 1/16/24   Chuck Cavanaugh MD   furosemide (Lasix) 20 mg tablet Take 1  tablet (20 mg) by mouth once daily.  Patient not taking: Reported on 10/4/2024 8/2/24 8/2/25  Brandan Mcclendon MD MPH   losartan-hydrochlorothiazide (Hyzaar) 100-25 mg tablet Take 0.5 tablets by mouth once daily. 10/4/24   Otoniel Klein MD   metoprolol tartrate (Lopressor) 25 mg tablet TAKE 1 TABLET EVERY DAY 1/12/24   Brandan Mcclendon MD MPH   multivit-min/FA/lycopen/lutein (SENTRY SENIOR ORAL) Take 1 tablet by mouth once daily.    Historical Provider, MD   oxybutynin XL (Ditropan-XL) 10 mg 24 hr tablet Take 1 tablet (10 mg) by mouth once daily. 9/26/24 9/26/25  Olegario Leo PA-C   potassium chloride CR 20 mEq ER tablet TAKE 1 TABLET EVERY DAY 1/12/24   Brandan Mcclendon MD MPH   tamsulosin (Flomax) 0.4 mg 24 hr capsule TAKE 1 CAPSULE AT BEDTIME 1/16/24   Chuck Cavanaugh MD   vit A/vit C/vit E/zinc/copper (PRESERVISION AREDS ORAL) Take 1 tablet by mouth once daily.    Historical Provider, MD   vits A,C,E/lutein/minerals (HEALTHY EYES ORAL) Take 1 tablet by mouth once daily.    Historical Provider, MD   latanoprost (Xalatan) 0.005 % ophthalmic solution Administer 1 drop into both eyes once daily at bedtime. 8/2/22 10/30/24  Historical Provider, MD     ALLERGIES: Contrast (hives), penicillin (hives)  Allergies   Allergen Reactions    Iodinated Contrast Media Unknown    Penicillins Rash, Hives and Unknown       REVIEW OF SYSTEMS:  Patient confused, ROS obtained as possible from brother.     Denies nausea, vomiting, shortness of breath, vision changes, headaches, chest pain, abdominal pain, dysuria, diarrhea, constipation, numbness and tingling in the extremities.     PHYSICAL EXAM:  PRIMARY SURVEY:  Airway  Airway is patent.     Breathing  Breathing is normal.     Circulation  Cardiac rhythm is regular.   Pulses        SECONDARY SURVEY/PHYSICAL EXAM:  Physical Exam  Constitutional:       General: He is not in acute distress.     Comments: Patient lying comfortably in bed, oriented to  self/situation only. Amnestic to most events of the last week.   HENT:      Head: Normocephalic and atraumatic.      Right Ear: External ear normal.      Left Ear: External ear normal.      Nose: Nose normal.      Mouth/Throat:      Mouth: Mucous membranes are moist.   Eyes:      Extraocular Movements: Extraocular movements intact.      Conjunctiva/sclera: Conjunctivae normal.      Pupils: Pupils are equal, round, and reactive to light.   Cardiovascular:      Rate and Rhythm: Normal rate and regular rhythm.      Pulses: Normal pulses.   Pulmonary:      Effort: Pulmonary effort is normal.      Breath sounds: Normal breath sounds.   Abdominal:      General: Abdomen is flat. There is no distension.      Palpations: Abdomen is soft.   Genitourinary:     Penis: Normal.    Musculoskeletal:         General: Normal range of motion.      Cervical back: Normal range of motion. No rigidity or tenderness.   Skin:     General: Skin is warm.   Neurological:      General: No focal deficit present.      Mental Status: He is disoriented.      Cranial Nerves: No cranial nerve deficit.      Sensory: No sensory deficit.      Motor: No weakness.      Comments: Strength, sensation, vascularity intact. Patient amnestic to events of the last week. Uncertain of how many times he has fallen, where he fell, why he fell, or what was injured.    Psychiatric:         Mood and Affect: Mood normal.       IMAGING SUMMARY:  (summary of findings, not a copy of dictation)  CT Head/Face: None  CT C-Spine: none  CT Chest/Abd/Pelvis: Fractures of left 5th-7h ribs, left hemopneumothorax  CXR/PXR: as above  Other(s): L1 compression fractuer    LABS:  Results from last 7 days   Lab Units 10/31/24  0824 10/30/24  1909   WBC AUTO x10*3/uL 10.2 7.8   HEMOGLOBIN g/dL 7.6* 8.2*   HEMATOCRIT % 22.1* 26.1*   PLATELETS AUTO x10*3/uL 192 208   NEUTROS PCT AUTO % 93.2  --    LYMPHS PCT AUTO % 2.2  --    MONOS PCT AUTO % 2.9  --    EOS PCT AUTO % 0.1  --           Results from last 7 days   Lab Units 10/30/24  1909   SODIUM mmol/L 140   POTASSIUM mmol/L 4.5   CHLORIDE mmol/L 110*   CO2 mmol/L 23   BUN mg/dL 73*   CREATININE mg/dL 1.87*   CALCIUM mg/dL 8.6   PROTEIN TOTAL g/dL 5.6*   BILIRUBIN TOTAL mg/dL 1.1   ALK PHOS U/L 87   ALT U/L 27   AST U/L 29   GLUCOSE mg/dL 87     Results from last 7 days   Lab Units 10/30/24  1909   BILIRUBIN TOTAL mg/dL 1.1           I have reviewed all laboratory and imaging results ordered/pertinent for this encounter.

## 2024-10-31 NOTE — CONSULTS
[facility] Department of Orthopaedic Surgery   Initial Consult Note  10/31/24    HPI:   Orthopaedic Problems/Injuries: Chronic L1 compression fx  Other Injuries: left hemothorax s/p CT, L 5-7 rib fx.     89M (CKDIII w/ recurrent UTIs, multiple falls, BPH, HTN, Glaucoma) p/a mGLF. Transfer from OSH after presentation w/ brother for generalized weakness c/w past UTIs. A/w L СЕРГЕЙ s/p CT, L 5-7 rib fx. Patient is confused but able to follow commands and participate in interview. No B/B, radicular pain, weakness or saddle anesthesia. Patient states he has not eaten for 4 days.     ROS  12-point review of systems is negative other than what is mentioned above.    Physical Exam:  Gen: AO x2 (self, place), NAD, mildly confused  HEENT: normocephalic atraumatic  Psych: appropriate mood and affect  Resp: nonlabored breathing  Cardiac: Extremities WWP, RRR to peripheral palpation  Neuro: CN 2-12 grossly intact  Skin: no rashes  MSK:   SPINE EXAM:  - Mild tenderness to palpation of T/L spine. No step-offs or deformities.     C5: SILT   Deltoid 5/5 Left; 5/5 Right  C6: SILT   Wrist Ext: 5/5 Left; 5/5 Right  C7: SILT   Triceps: 5/5 Left; 5/5 Right  C8: SILT   Finger flexion: 5/5 Left; 5/5 Right  T1: SILT    Interossei: 5/5 Left; 5/5 Right    Negative jurado    L1: SILT       L2: SILT      Hip flexors 5/5 Left; 5/5 Right  L3: SILT      Knee extension 5/5 Left; 5/5 Right  L4: SILT      Tib Ant. (Dorsiflexion) 5/5 Left; 5/5 Right  L5: SILT      EHL5/5 Left; 5/5 Right  S1: SILT      Planter flexion 5/5 Left; 5/5 Right    No ankle clonus, negative babinski      A full secondary exam was performed and all relevant findings discussed and noted above.    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 Comparable to prior imaging 8/8/24.       Assessment:  Orthopaedic Problems/Injuries: Chronic L1  compression fx    89M (CKDIII w/ recurrent UTIs, multiple falls, BPH, HTN, Glaucoma) p/a mGLF. A/w L СЕРГЕЙ s/p CT, L 5-7 rib fx. No B/B, radicular pain, weakness or saddle anesthesia. On exam, 5/5 strength BLUE/BLLE, no UMN sx. CT w/ DISH spine, chronic L1 compression fx w/ retropulsion to canal and moderate to severe stenosis. Comparable to prior imaging 8/8/24.     Plan:  - Dispo: admit TICU  - OR: no acute operative intervention at this time  - WB: OOBAT no restrictions  - Recommend T/L uprights when able  - Ortho spine to follow peripherally     This patient was seen within 30 minutes of initial consult and staffed with attending physician Dr. Worthington.     This patient will be followed by the ORTHO SPINE service while in-house. Please contact the following team members for any additional questions/concerns.     Orthopedic spine attending    Patient evaluated and imaging reviewed.    As noted, no indication for surgical intervention.  Can mobilize as tolerated.  Follow-up as needed.    Ortho Spine  Joe Pascal MD PGY2  Tone Porter MD PGY4     Please page [phone] (ortho on-call) after 6pm and on weekends.  _________________________________________________________________________________    Evens Boo MD PGY2  Orthopaedic Surgery  On-call Resident  Epic Chat Preferred

## 2024-10-31 NOTE — PROGRESS NOTES
Emergency Department Transition of Care Note       Signout   I received Tommy Richmond in signout from Dr. Umanzor.  Please see the ED Provider Note for all HPI, PE and MDM up to the time of signout at 0700.  This is in addition to the primary record.    In brief Tommy Richmond is an 89 y.o. male presenting as a trauma transfer for multiple rib fractures, hemopneumo after a fall.  Patient does have a new oxygen requirement.  The rise he is hemodynamically stable.  Remainder of patient's pan scan including L-spine and plain films of the left wrist and hand were ordered.    At the time of signout we were awaiting:  Trauma recommendations    ED Course & Medical Decision Making   Medical Decision Making:  Under my care, patient had his CT as documented below which showed a L1 compression deformity without significant retropulsion.  Left wrist and hand x-rays were negative.  Patient to be admitted to the trauma ICU given the multiple rib fractures, advanced age, hemopneumothorax.    ED Course:  ED Course as of 10/31/24 1153   Thu Oct 31, 2024   1151 CT lumbar spine wo IV contrast   Diffuse osseous demineralization.  2. Mild convexity of the lumbar spine to the right of the midline.  3. Moderate to severe narrowing of the intervertebral disc spaces at L3-4 and L4-5.  4. Severe anterior wedge shaped compression deformity of L1 with vertebral plana formation noted anteriorly. No significant bony  retropulsion into the central canal. Findings are indeterminate but  appear chronic.  5. L1-2: There is a broad-based disc protrusion effacing the ventral thecal sac resulting in mild central canal stenosis.  6. L2-3: There is a broad-based disc protrusion impressing on the  ventral thecal sac resulting in mild central canal stenosis.  7. L3-4: There is a broad-based disc protrusion effacing the ventral thecal sac resulting in minimal central canal stenosis.  8. L4-5: Broad-based disc protrusion effacing the ventral thecal sac  resulting in minimal central canal stenosis.   [AW]      ED Course User Index  [AW] Letty Ann DO         Diagnoses as of 10/31/24 1153   Hemopneumothorax on left   Closed fracture of multiple ribs of left side, initial encounter   Hypoxia   Closed compression fracture of body of L1 vertebra (Multi)       Disposition   As a result of their workup, the patient will require admission to the hospital.  The patient was informed of his diagnosis.  The patient was given the opportunity to ask questions and I answered them. The patient agreed to be admitted to the hospital.    Procedures   Procedures    Patient seen and discussed with ED attending physician.    Letty Ann DO  Emergency Medicine      ** Please excuse any errors in grammar or translation related to this dictation. Voice recognition software was utilized to prepare this document. **       Earl Gonsalves MD  [facility] Emergency Medicine

## 2024-10-31 NOTE — H&P
[facility]  TRAUMA ICU - HISTORY AND PHYSICAL    Patient Name: Tommy Richmond  MRN: [id number]  Admit Date: 1031  : 1935  AGE: 89 y.o.   GENDER: male  ==============================================================================  MECHANISM OF INJURY / CHIEF COMPLAINT:     89-year-old male who presented after a ground-level fall of unknown mechanism. Due to patient's confusion, history obtained via chart review and conversation w/ patient's son.   LOC (yes/no?): No  Anticoagulant / Anti-platelet Rx? (for what dx?): No  Referring Facility Name (N/A for scene EMR run): [facility]    INJURIES:   Left Hemo/Pneumothorax  L1 Compression fracture  Fracture of left ribs 5-7    OTHER MEDICAL PROBLEMS:  Recurrent UTIs with stage III Kidney disease  HTN  Glaucoma  BPH    INCIDENTAL FINDINGS:  Complex kidney cyst  Cholelithiasis    ==============================================================================  ADMISSION PLAN OF CARE:    NEURO/PAIN/SEDATION:   #Acute pain  -Pain consult -> Not a candidate for block   -Tylenol 650 Q6hr  - Oxycodone, robaxin, dilaudid PRN  -Lidocaine patch    RESPIRATORY:   #Left hemo/pneumothorax   -L pigtail placed in ED  -Monitor output  -Incentive spirometry  -NC maintain sats above 92 %  -Aggressive pulmonary hygiene    CARDIOVASC:   #Hx HTN    GI/FEN:  -mIVF LR 75 mL/hr  -Miralax    :   #Hx BPH  -Cont home flowmax     HEMATOLOGIC:   #Acute blood loss anemia   -Hgb 7.6  -Transfuse for Hgb >7  -Monitor for signs of bleeding    ENDOCRINE:   -Q4hr glucose checks   -SSI #1 while NPO    MUSCULOSKELETAL/SKIN:   #L1 compression fracture   -Ortho spine consult  #Left sided 5th-7th rib fxs  -Pigtail placed in ED  -Pain control as above     INFECTIOUS DISEASE:   #Recurrent UTI  -Asymptomatic on exam  -Levaquin Q24hr   -Follow up cultures  -Afebrile     GI PROPHYLAXIS: Not indicated     DVT PROPHYLAXIS: SCD     Lines/tubes: PIV, Left 14fr pigtail chest  tube     DISPOSITION: continue care in ICU     Edmundo Villa DO   Emergency Medicine PGY-2  Trauma ICU   [id number]    ==============================================================================  PAST MEDICAL HISTORY:   PMH: chronic UTIs, CKD stage III, glaucoma  Past Medical History:   Diagnosis Date    Personal history of other diseases of urinary system     History of bladder stone       PSH: Patient and son unable to provide history, as below  Past Surgical History:   Procedure Laterality Date    OTHER SURGICAL HISTORY  10/25/2019    Inguinal hernia repair    OTHER SURGICAL HISTORY  10/25/2019    Small bowel resection    OTHER SURGICAL HISTORY  10/25/2019    Colonoscopy    OTHER SURGICAL HISTORY  10/25/2019    Cystoscopy    OTHER SURGICAL HISTORY  10/25/2019    Tonsillectomy     FH: As below  Family History   Problem Relation Name Age of Onset    Colon cancer Father      Prostate cancer Father      Colon cancer Sister      Prostate cancer Brother       SOCIAL HISTORY:    Smoking: Never   Social History     Tobacco Use   Smoking Status Former    Types: Cigarettes   Smokeless Tobacco Never       Alcohol: Never   Social History     Substance and Sexual Activity   Alcohol Use Never       Drug use: None    MEDICATIONS: As below  Prior to Admission medications    Medication Sig Start Date End Date Taking? Authorizing Provider   alendronate (Fosamax) 70 mg tablet Take 1 tablet (70 mg) by mouth every 7 days. Take in the morning with a full glass of water, on an empty stomach, and do not take anything else by mouth or lie down for the next 30 min. 2/2/24 2/1/25  Brandan Mcclendon MD MPH   ammonium lactate (Lac-Hydrin) 12 % lotion 1 Application once daily as needed. To lower legs and feet. 2/14/24   Historical Provider, MD   finasteride (Proscar) 5 mg tablet TAKE 1 TABLET EVERY DAY 1/16/24   Chuck Cavanaugh MD   furosemide (Lasix) 20 mg tablet Take 1 tablet (20 mg) by mouth once daily.  Patient not taking: Reported on  10/4/2024 8/2/24 8/2/25  Brandan Mcclendon MD MPH   losartan-hydrochlorothiazide (Hyzaar) 100-25 mg tablet Take 0.5 tablets by mouth once daily. 10/4/24   Otoniel Klein MD   metoprolol tartrate (Lopressor) 25 mg tablet TAKE 1 TABLET EVERY DAY 1/12/24   Brandan Mcclendon MD MPH   multivit-min/FA/lycopen/lutein (SENTRY SENIOR ORAL) Take 1 tablet by mouth once daily.    Historical Provider, MD   oxybutynin XL (Ditropan-XL) 10 mg 24 hr tablet Take 1 tablet (10 mg) by mouth once daily. 9/26/24 9/26/25  Olegario Leo PA-C   potassium chloride CR 20 mEq ER tablet TAKE 1 TABLET EVERY DAY 1/12/24   Brandan Mcclendon MD MPH   tamsulosin (Flomax) 0.4 mg 24 hr capsule TAKE 1 CAPSULE AT BEDTIME 1/16/24   Chuck Cavanaugh MD   vit A/vit C/vit E/zinc/copper (PRESERVISION AREDS ORAL) Take 1 tablet by mouth once daily.    Historical Provider, MD   vits A,C,E/lutein/minerals (HEALTHY EYES ORAL) Take 1 tablet by mouth once daily.    Historical Provider, MD   latanoprost (Xalatan) 0.005 % ophthalmic solution Administer 1 drop into both eyes once daily at bedtime. 8/2/22 10/30/24  Historical Provider, MD     ALLERGIES: Contrast (hives), penicillin (hives)  Allergies   Allergen Reactions    Iodinated Contrast Media Unknown    Penicillins Rash, Hives and Unknown       PHYSICAL EXAM:  PRIMARY SURVEY:  Airway  Airway is patent.     Breathing  Breathing is normal.     Circulation  Cardiac rhythm is regular.   Pulses        SECONDARY SURVEY/PHYSICAL EXAM:  Physical Exam  Constitutional:       General: He is awake. He is not in acute distress.     Comments: Patient lying comfortably in bed, oriented to self/situation only. Amnestic to most events of the last week. Is able to say that he fell at home.   HENT:      Head: Normocephalic and atraumatic.      Right Ear: External ear normal.      Left Ear: External ear normal.      Nose: Nose normal.      Mouth/Throat:      Mouth: Mucous membranes are moist.   Eyes:       Extraocular Movements: Extraocular movements intact.      Conjunctiva/sclera: Conjunctivae normal.      Pupils: Pupils are equal, round, and reactive to light.   Neck:      Comments: No midline cervical tenderness or stepoffs   Cardiovascular:      Rate and Rhythm: Normal rate and regular rhythm.      Pulses: Normal pulses.           Radial pulses are 2+ on the right side and 2+ on the left side.        Dorsalis pedis pulses are 2+ on the right side and 2+ on the left side.      Heart sounds: Normal heart sounds, S1 normal and S2 normal. Heart sounds not distant. No murmur heard.  Pulmonary:      Effort: Pulmonary effort is normal.      Breath sounds: Normal breath sounds.      Comments: Equal chest rise and fall.No respiratory distress.   Chest:      Comments: There is left lower chest wall ecchymosis with tenderness.   Abdominal:      General: Abdomen is flat. There is no distension.      Palpations: Abdomen is soft.      Tenderness: There is no abdominal tenderness.      Comments: There is Left quadrant ecchymosis extending to the midline abdomen.    Genitourinary:     Penis: Normal.    Musculoskeletal:         General: Normal range of motion.      Cervical back: Full passive range of motion without pain and normal range of motion. No tenderness.      Right lower leg: No edema.      Left lower leg: No edema.   Skin:     General: Skin is warm.   Neurological:      General: No focal deficit present.      Mental Status: He is alert. He is disoriented.      Cranial Nerves: No cranial nerve deficit.      Sensory: No sensory deficit.      Motor: No weakness.   Psychiatric:         Mood and Affect: Mood normal.         Behavior: Behavior is cooperative.       LABS:  Results from last 7 days   Lab Units 10/31/24  0824 10/30/24  1909   WBC AUTO x10*3/uL 10.2 7.8   HEMOGLOBIN g/dL 7.6* 8.2*   HEMATOCRIT % 22.1* 26.1*   PLATELETS AUTO x10*3/uL 192 208   NEUTROS PCT AUTO % 93.2  --    LYMPHS PCT AUTO % 2.2  --    MONOS PCT  AUTO % 2.9  --    EOS PCT AUTO % 0.1  --          Results from last 7 days   Lab Units 10/31/24  1417 10/30/24  1909   SODIUM mmol/L 143 140   POTASSIUM mmol/L 3.5 4.5   CHLORIDE mmol/L 116* 110*   CO2 mmol/L 20* 23   BUN mg/dL 49* 73*   CREATININE mg/dL 1.18 1.87*   CALCIUM mg/dL 6.7* 8.6   PROTEIN TOTAL g/dL  --  5.6*   BILIRUBIN TOTAL mg/dL  --  1.1   ALK PHOS U/L  --  87   ALT U/L  --  27   AST U/L  --  29   GLUCOSE mg/dL 94 87     Results from last 7 days   Lab Units 10/30/24  1909   BILIRUBIN TOTAL mg/dL 1.1           I have reviewed all laboratory and imaging results ordered/pertinent for this encounter.

## 2024-10-31 NOTE — ED PROVIDER NOTES
History of Present Illness   History provided by: Patient and EMS  Limitations to History: None  External Records Reviewed with Brief Summary:  ED note from the outside hospital reviewed with CT imaging showing small left-sided pneumothorax, left pleural effusion and findings concerning for pulmonary contusion, acute fractures of the posterior left 5th-7th ribs, large posterior left chest wall hematoma measuring 14.6 cm.  There is also noted to be a compression deformity of the vertebral plana L1 with convexity along the posterior inferior endplate with narrowing of the central canal which may be chronic.  Head and neck CT were negative.    HPI:  Tommy Richmond is a 89 y.o. male with a past medical history of hypertension, CKD stage III, recurrent UTIs, and BPH that presents the emergency department today as a transfer from [facility] where he presented for multiple falls and was found to have left-sided rib fractures 5-7 with a large chest wall hematoma and pulmonary contusions.  Patient also had an L1 vertebral deformity which is unclear if it is new or old based on imaging findings.  Upon arrival to the emergency department the patient did complain of pain over the his left chest wall as well as some mild shortness of breath but denied any other associated symptoms.  He did not receive any pain medication by EMS and route and does not appear to have any pain medication that was given at the outside hospital as well.    Physical Exam   Triage vitals:  T 36.8 °C (98.2 °F)  HR (!) 106  /81  RR 18  O2 97 % Supplemental oxygen    Vital signs reviewed in nursing triage note, EMR flow sheets, and at patient's bedside.   General: Awake, alert, in no acute distress  Eyes: Gaze conjugate.  No scleral icterus or injection  HENT: Normo-cephalic, atraumatic. No stridor. No rhinorrhea or epistaxis.  CV: Tachycardic with regular rhythm. No murmurs appreciated.  2+ radial and DP pulses bilaterally.  Respiratory:  Breathing non-labored, speaking in full sentences.  Lungs clear to auscultation bilaterally but slightly diminished on the left side.  Chest wall: Left chest wall tenderness to palpation with no obvious bony step-offs or crepitus.  Large area of ecchymosis and hematoma present over the left lateral and posterior chest wall extending down into the anterior lateral and posterior lateral abdomen.  GI: Soft, non-distended, non-tender. No rebound or guarding.  Large area of ecchymosis present over the anterior lateral and posterior lateral abdominal wall.  MSK/Extremities: No gross bony deformities. Moving all extremities. No lower extremity edema.  Left wrist and hand ecchymosis with mild tenderness to palpation but no obvious bony step-offs or bony deformities.  Skin: Warm. Appropriate color  Neuro: Alert. Oriented. Face symmetric. Speech is fluent.  Gross strength and sensation intact in b/l UE and LEs  Psych: Appropriate mood and affect      Medical Decision Making & ED Course   Medical Decision Makin y.o. male with the above-stated past medical history that presents to the emergency department as a transfer from [facility] for trauma surgery evaluation.  The patient is known to have posterior left 5-7 rib fractures with a small pneumothorax as well as large chest wall hematoma.  Upon arrival to the emergency department the patient was mildly tachycardic with heart rate of 106 and was saturating well on 2 L of oxygen.  Additional vital signs were stable.  The patient was in no acute distress and exam findings are as above but notable for left chest wall tenderness to palpation with a large area of ecchymosis and hematoma extending over the left lateral and posterior chest wall down to the anterior lateral and posterior lateral abdomen.  The patient was given a dose of Dilaudid for his pain as it does not appear that he had any medications at the outside hospital or in route.  Trauma surgery was consulted and  recommended obtaining a repeat CBC, CT imaging of the lumbar spine as well as repeat chest x-ray.  The patient did have ecchymosis and some tenderness to palpation of the left wrist and left hand and x-ray imaging will be obtained.  The patient was signed out to Letty Ann DO in stable condition pending imaging and final trauma surgery recommendations.    Differential diagnoses considered include but are not limited to: Pneumothorax, left rib fractures, chest wall hematoma, wrist fracture, anemia    ED Course:  ED Course as of 10/31/24 2220   Thu Oct 31, 2024   1151 CT lumbar spine wo IV contrast   Diffuse osseous demineralization.  2. Mild convexity of the lumbar spine to the right of the midline.  3. Moderate to severe narrowing of the intervertebral disc spaces at L3-4 and L4-5.  4. Severe anterior wedge shaped compression deformity of L1 with vertebral plana formation noted anteriorly. No significant bony  retropulsion into the central canal. Findings are indeterminate but  appear chronic.  5. L1-2: There is a broad-based disc protrusion effacing the ventral thecal sac resulting in mild central canal stenosis.  6. L2-3: There is a broad-based disc protrusion impressing on the  ventral thecal sac resulting in mild central canal stenosis.  7. L3-4: There is a broad-based disc protrusion effacing the ventral thecal sac resulting in minimal central canal stenosis.  8. L4-5: Broad-based disc protrusion effacing the ventral thecal sac resulting in minimal central canal stenosis.   [AW]      ED Course User Index  [AW] Letty Ann DO         Diagnoses as of 10/31/24 2220   Hemopneumothorax on left   Closed fracture of multiple ribs of left side, initial encounter   Hypoxia   Closed compression fracture of body of L1 vertebra (Multi)        Social Determinants of Health which Significantly Impact Care: None identified     EKG Independent Interpretation:  EKG from outside hospital personally interpreted by me  showing normal sinus rhythm with left axis deviation.  KS and QRS intervals are within normal limits.  QRS duration is mildly prolonged at 124 ms.  There is no ST elevation or depressions appreciated.  There are T wave inversions in lead III and aVF but additional interpretation is limited secondary to motion artifact.  No MAYRA.  EKG is unchanged from prior noted on 9/14/2024.    Independent Result Review and Interpretation:  Imaging pending at the time of signout.    Chronic conditions affecting the patient's care: As documented in the HPI    The patient was discussed with the following consultants/services: Dr. Coy Gaytan with trauma surgery team who recommended repeat chest x-ray, repeat CBC as well as CT of the lumbar spine.    Care Considerations: As documented above in MDM    Disposition   Patient was signed out to Letty Ann DO at 0700 pending completion of their work-up.  Please see the next provider's transition of care note for the remainder of the patient's care.     Procedures   Procedures    Patient seen and discussed with ED attending physician.    Tone Umanzor DO   Emergency Medicine, PGY-2     Disclaimer: This note was dictated by speech recognition. Minor errors in transcription may be present.     [unfilled] ? SmartLinks last updated 10/31/2024 6:01 AM        Tone Umanzor DO  Resident  10/31/24 [id number]

## 2024-11-01 ENCOUNTER — APPOINTMENT (OUTPATIENT)
Dept: RADIOLOGY | Facility: HOSPITAL | Age: 89
DRG: 183 | End: 2024-11-01
Payer: MEDICARE

## 2024-11-01 ENCOUNTER — APPOINTMENT (OUTPATIENT)
Dept: RADIOLOGY | Facility: HOSPITAL | Age: 89
End: 2024-11-01
Payer: MEDICARE

## 2024-11-01 LAB
ANION GAP SERPL CALC-SCNC: 13 MMOL/L
ATRIAL RATE: 86 BPM
BUN SERPL-MCNC: 47 MG/DL (ref 6–23)
CA-I BLD-SCNC: 1.15 MMOL/L (ref 1.1–1.33)
CALCIUM SERPL-MCNC: 8.1 MG/DL (ref 8.6–10.6)
CARDIAC TROPONIN I PNL SERPL HS: 46 NG/L (ref 0–53)
CHLORIDE SERPL-SCNC: 110 MMOL/L (ref 98–107)
CO2 SERPL-SCNC: 22 MMOL/L (ref 21–32)
CREAT SERPL-MCNC: 1.25 MG/DL (ref 0.5–1.3)
EGFRCR SERPLBLD CKD-EPI 2021: 56 ML/MIN/1.73M*2
ERYTHROCYTE [DISTWIDTH] IN BLOOD BY AUTOMATED COUNT: 14.8 % (ref 11.5–14.5)
ERYTHROCYTE [DISTWIDTH] IN BLOOD BY AUTOMATED COUNT: 14.9 % (ref 11.5–14.5)
GLUCOSE BLD MANUAL STRIP-MCNC: 112 MG/DL (ref 74–99)
GLUCOSE BLD MANUAL STRIP-MCNC: 116 MG/DL (ref 74–99)
GLUCOSE BLD MANUAL STRIP-MCNC: 116 MG/DL (ref 74–99)
GLUCOSE BLD MANUAL STRIP-MCNC: 72 MG/DL (ref 74–99)
GLUCOSE BLD MANUAL STRIP-MCNC: 94 MG/DL (ref 74–99)
GLUCOSE SERPL-MCNC: 89 MG/DL (ref 74–99)
HCT VFR BLD AUTO: 23.7 % (ref 41–52)
HCT VFR BLD AUTO: 24.4 % (ref 41–52)
HGB BLD-MCNC: 7.6 G/DL (ref 13.5–17.5)
HGB BLD-MCNC: 7.9 G/DL (ref 13.5–17.5)
MAGNESIUM SERPL-MCNC: 1.98 MG/DL (ref 1.6–2.4)
MCH RBC QN AUTO: 29.3 PG (ref 26–34)
MCH RBC QN AUTO: 29.5 PG (ref 26–34)
MCHC RBC AUTO-ENTMCNC: 32.1 G/DL (ref 32–36)
MCHC RBC AUTO-ENTMCNC: 32.4 G/DL (ref 32–36)
MCV RBC AUTO: 91 FL (ref 80–100)
MCV RBC AUTO: 92 FL (ref 80–100)
NRBC BLD-RTO: 0 /100 WBCS (ref 0–0)
NRBC BLD-RTO: 0 /100 WBCS (ref 0–0)
P AXIS: 24 DEGREES
P OFFSET: 189 MS
P ONSET: 130 MS
PLATELET # BLD AUTO: 195 X10*3/UL (ref 150–450)
PLATELET # BLD AUTO: 200 X10*3/UL (ref 150–450)
POTASSIUM SERPL-SCNC: 4.1 MMOL/L (ref 3.5–5.3)
PR INTERVAL: 190 MS
Q ONSET: 225 MS
QRS COUNT: 14 BEATS
QRS DURATION: 124 MS
QT INTERVAL: 366 MS
QTC CALCULATION(BAZETT): 437 MS
QTC FREDERICIA: 412 MS
R AXIS: -42 DEGREES
RBC # BLD AUTO: 2.59 X10*6/UL (ref 4.5–5.9)
RBC # BLD AUTO: 2.68 X10*6/UL (ref 4.5–5.9)
SODIUM SERPL-SCNC: 141 MMOL/L (ref 136–145)
T AXIS: -74 DEGREES
T OFFSET: 408 MS
VENTRICULAR RATE: 86 BPM
WBC # BLD AUTO: 7.1 X10*3/UL (ref 4.4–11.3)
WBC # BLD AUTO: 7.8 X10*3/UL (ref 4.4–11.3)

## 2024-11-01 PROCEDURE — 72072 X-RAY EXAM THORAC SPINE 3VWS: CPT | Performed by: RADIOLOGY

## 2024-11-01 PROCEDURE — 2500000002 HC RX 250 W HCPCS SELF ADMINISTERED DRUGS (ALT 637 FOR MEDICARE OP, ALT 636 FOR OP/ED): Performed by: STUDENT IN AN ORGANIZED HEALTH CARE EDUCATION/TRAINING PROGRAM

## 2024-11-01 PROCEDURE — 85027 COMPLETE CBC AUTOMATED: CPT | Performed by: STUDENT IN AN ORGANIZED HEALTH CARE EDUCATION/TRAINING PROGRAM

## 2024-11-01 PROCEDURE — 2500000005 HC RX 250 GENERAL PHARMACY W/O HCPCS: Performed by: STUDENT IN AN ORGANIZED HEALTH CARE EDUCATION/TRAINING PROGRAM

## 2024-11-01 PROCEDURE — 82947 ASSAY GLUCOSE BLOOD QUANT: CPT

## 2024-11-01 PROCEDURE — 72100 X-RAY EXAM L-S SPINE 2/3 VWS: CPT

## 2024-11-01 PROCEDURE — 93880 EXTRACRANIAL BILAT STUDY: CPT

## 2024-11-01 PROCEDURE — 72100 X-RAY EXAM L-S SPINE 2/3 VWS: CPT | Performed by: RADIOLOGY

## 2024-11-01 PROCEDURE — 83735 ASSAY OF MAGNESIUM: CPT

## 2024-11-01 PROCEDURE — 2500000004 HC RX 250 GENERAL PHARMACY W/ HCPCS (ALT 636 FOR OP/ED)

## 2024-11-01 PROCEDURE — 99223 1ST HOSP IP/OBS HIGH 75: CPT | Performed by: INTERNAL MEDICINE

## 2024-11-01 PROCEDURE — 85027 COMPLETE CBC AUTOMATED: CPT

## 2024-11-01 PROCEDURE — 99231 SBSQ HOSP IP/OBS SF/LOW 25: CPT

## 2024-11-01 PROCEDURE — 2500000005 HC RX 250 GENERAL PHARMACY W/O HCPCS

## 2024-11-01 PROCEDURE — 99232 SBSQ HOSP IP/OBS MODERATE 35: CPT | Performed by: STUDENT IN AN ORGANIZED HEALTH CARE EDUCATION/TRAINING PROGRAM

## 2024-11-01 PROCEDURE — 97535 SELF CARE MNGMENT TRAINING: CPT | Mod: GO

## 2024-11-01 PROCEDURE — 97162 PT EVAL MOD COMPLEX 30 MIN: CPT | Mod: GP | Performed by: PHYSICAL THERAPIST

## 2024-11-01 PROCEDURE — 93880 EXTRACRANIAL BILAT STUDY: CPT | Performed by: RADIOLOGY

## 2024-11-01 PROCEDURE — 36415 COLL VENOUS BLD VENIPUNCTURE: CPT | Performed by: STUDENT IN AN ORGANIZED HEALTH CARE EDUCATION/TRAINING PROGRAM

## 2024-11-01 PROCEDURE — 36415 COLL VENOUS BLD VENIPUNCTURE: CPT

## 2024-11-01 PROCEDURE — 2500000004 HC RX 250 GENERAL PHARMACY W/ HCPCS (ALT 636 FOR OP/ED): Performed by: STUDENT IN AN ORGANIZED HEALTH CARE EDUCATION/TRAINING PROGRAM

## 2024-11-01 PROCEDURE — 82306 VITAMIN D 25 HYDROXY: CPT

## 2024-11-01 PROCEDURE — 97166 OT EVAL MOD COMPLEX 45 MIN: CPT | Mod: GO

## 2024-11-01 PROCEDURE — 2500000001 HC RX 250 WO HCPCS SELF ADMINISTERED DRUGS (ALT 637 FOR MEDICARE OP): Performed by: STUDENT IN AN ORGANIZED HEALTH CARE EDUCATION/TRAINING PROGRAM

## 2024-11-01 PROCEDURE — 1100000001 HC PRIVATE ROOM DAILY

## 2024-11-01 PROCEDURE — 99291 CRITICAL CARE FIRST HOUR: CPT | Performed by: STUDENT IN AN ORGANIZED HEALTH CARE EDUCATION/TRAINING PROGRAM

## 2024-11-01 PROCEDURE — 72072 X-RAY EXAM THORAC SPINE 3VWS: CPT

## 2024-11-01 PROCEDURE — 82330 ASSAY OF CALCIUM: CPT

## 2024-11-01 PROCEDURE — 84484 ASSAY OF TROPONIN QUANT: CPT | Performed by: STUDENT IN AN ORGANIZED HEALTH CARE EDUCATION/TRAINING PROGRAM

## 2024-11-01 PROCEDURE — 97530 THERAPEUTIC ACTIVITIES: CPT | Mod: GP | Performed by: PHYSICAL THERAPIST

## 2024-11-01 PROCEDURE — 2500000001 HC RX 250 WO HCPCS SELF ADMINISTERED DRUGS (ALT 637 FOR MEDICARE OP)

## 2024-11-01 PROCEDURE — 71045 X-RAY EXAM CHEST 1 VIEW: CPT | Performed by: RADIOLOGY

## 2024-11-01 PROCEDURE — 71045 X-RAY EXAM CHEST 1 VIEW: CPT

## 2024-11-01 PROCEDURE — 80048 BASIC METABOLIC PNL TOTAL CA: CPT

## 2024-11-01 RX ORDER — ENOXAPARIN SODIUM 100 MG/ML
30 INJECTION SUBCUTANEOUS 2 TIMES DAILY
Status: DISPENSED | OUTPATIENT
Start: 2024-11-01

## 2024-11-01 RX ORDER — OXYCODONE HYDROCHLORIDE 10 MG/1
10 TABLET ORAL EVERY 6 HOURS PRN
Status: DISCONTINUED | OUTPATIENT
Start: 2024-11-01 | End: 2024-11-02

## 2024-11-01 RX ORDER — OXYCODONE HYDROCHLORIDE 5 MG/1
5 TABLET ORAL EVERY 6 HOURS PRN
Status: DISCONTINUED | OUTPATIENT
Start: 2024-11-01 | End: 2024-11-02

## 2024-11-01 RX ADMIN — ACETAMINOPHEN 650 MG: 325 TABLET, FILM COATED ORAL at 08:55

## 2024-11-01 RX ADMIN — Medication 2 L/MIN: at 08:00

## 2024-11-01 RX ADMIN — ENOXAPARIN SODIUM 30 MG: 30 INJECTION SUBCUTANEOUS at 10:06

## 2024-11-01 RX ADMIN — ACETAMINOPHEN 650 MG: 325 TABLET, FILM COATED ORAL at 21:16

## 2024-11-01 RX ADMIN — LEVOFLOXACIN 250 MG: 250 INJECTION, SOLUTION INTRAVENOUS at 10:06

## 2024-11-01 RX ADMIN — METHOCARBAMOL 500 MG: 500 TABLET ORAL at 21:16

## 2024-11-01 RX ADMIN — ENOXAPARIN SODIUM 30 MG: 30 INJECTION SUBCUTANEOUS at 21:16

## 2024-11-01 RX ADMIN — TAMSULOSIN HYDROCHLORIDE 0.4 MG: 0.4 CAPSULE ORAL at 08:20

## 2024-11-01 RX ADMIN — LIDOCAINE 1 PATCH: 4 PATCH TOPICAL at 08:19

## 2024-11-01 RX ADMIN — OXYCODONE HYDROCHLORIDE 5 MG: 5 TABLET ORAL at 00:58

## 2024-11-01 RX ADMIN — METHOCARBAMOL 500 MG: 500 TABLET ORAL at 06:26

## 2024-11-01 RX ADMIN — ACETAMINOPHEN 650 MG: 325 TABLET, FILM COATED ORAL at 03:04

## 2024-11-01 RX ADMIN — METOPROLOL TARTRATE 12.5 MG: 25 TABLET, FILM COATED ORAL at 21:16

## 2024-11-01 RX ADMIN — METHOCARBAMOL 500 MG: 500 TABLET ORAL at 13:16

## 2024-11-01 RX ADMIN — METOPROLOL TARTRATE 12.5 MG: 25 TABLET, FILM COATED ORAL at 08:20

## 2024-11-01 RX ADMIN — OXYCODONE HYDROCHLORIDE 10 MG: 5 TABLET ORAL at 08:20

## 2024-11-01 RX ADMIN — SODIUM CHLORIDE, POTASSIUM CHLORIDE, SODIUM LACTATE AND CALCIUM CHLORIDE 75 ML/HR: 600; 310; 30; 20 INJECTION, SOLUTION INTRAVENOUS at 06:26

## 2024-11-01 ASSESSMENT — COGNITIVE AND FUNCTIONAL STATUS - GENERAL
CLIMB 3 TO 5 STEPS WITH RAILING: TOTAL
DRESSING REGULAR UPPER BODY CLOTHING: TOTAL
MOBILITY SCORE: 7
TOILETING: A LITTLE
MOVING TO AND FROM BED TO CHAIR: A LITTLE
MOVING FROM LYING ON BACK TO SITTING ON SIDE OF FLAT BED WITH BEDRAILS: A LOT
DRESSING REGULAR LOWER BODY CLOTHING: A LOT
MOVING TO AND FROM BED TO CHAIR: TOTAL
TURNING FROM BACK TO SIDE WHILE IN FLAT BAD: TOTAL
STANDING UP FROM CHAIR USING ARMS: A LITTLE
WALKING IN HOSPITAL ROOM: A LOT
HELP NEEDED FOR BATHING: A LOT
DAILY ACTIVITIY SCORE: 15
EATING MEALS: A LITTLE
PERSONAL GROOMING: A LOT
DRESSING REGULAR LOWER BODY CLOTHING: TOTAL
MOBILITY SCORE: 16
MOVING FROM LYING ON BACK TO SITTING ON SIDE OF FLAT BED WITH BEDRAILS: A LITTLE
TOILETING: TOTAL
DAILY ACTIVITIY SCORE: 9
HELP NEEDED FOR BATHING: TOTAL
TURNING FROM BACK TO SIDE WHILE IN FLAT BAD: A LITTLE
CLIMB 3 TO 5 STEPS WITH RAILING: A LOT
STANDING UP FROM CHAIR USING ARMS: TOTAL
DRESSING REGULAR UPPER BODY CLOTHING: A LOT
PERSONAL GROOMING: A LOT
WALKING IN HOSPITAL ROOM: TOTAL

## 2024-11-01 ASSESSMENT — PAIN SCALES - GENERAL
PAINLEVEL_OUTOF10: 8
PAINLEVEL_OUTOF10: 2
PAINLEVEL_OUTOF10: 2
PAINLEVEL_OUTOF10: 9
PAINLEVEL_OUTOF10: 0 - NO PAIN
PAINLEVEL_OUTOF10: 0 - NO PAIN
PAINLEVEL_OUTOF10: 5 - MODERATE PAIN
PAINLEVEL_OUTOF10: 0 - NO PAIN
PAINLEVEL_OUTOF10: 9
PAINLEVEL_OUTOF10: 0 - NO PAIN
PAINLEVEL_OUTOF10: 5 - MODERATE PAIN
PAINLEVEL_OUTOF10: 0 - NO PAIN
PAINLEVEL_OUTOF10: 0 - NO PAIN

## 2024-11-01 ASSESSMENT — PAIN - FUNCTIONAL ASSESSMENT
PAIN_FUNCTIONAL_ASSESSMENT: 0-10

## 2024-11-01 ASSESSMENT — ACTIVITIES OF DAILY LIVING (ADL)
BATHING_ASSISTANCE: MAXIMAL
HOME_MANAGEMENT_TIME_ENTRY: 12

## 2024-11-01 ASSESSMENT — PAIN DESCRIPTION - DESCRIPTORS
DESCRIPTORS: SHARP
DESCRIPTORS: DISCOMFORT;DULL;HEAVINESS

## 2024-11-01 ASSESSMENT — PAIN DESCRIPTION - LOCATION: LOCATION: BACK

## 2024-11-01 ASSESSMENT — PAIN DESCRIPTION - ORIENTATION
ORIENTATION: RIGHT
ORIENTATION: RIGHT

## 2024-11-01 NOTE — CARE PLAN
The patient's goals for the shift include      The clinical goals for the shift include pt remains safe from falls & injury        Problem: Skin  Goal: Decreased wound size/increased tissue granulation at next dressing change  Outcome: Progressing  Flowsheets (Taken 11/1/2024 1803)  Decreased wound size/increased tissue granulation at next dressing change: Promote sleep for wound healing  Goal: Participates in plan/prevention/treatment measures  Outcome: Progressing  Flowsheets (Taken 11/1/2024 1803)  Participates in plan/prevention/treatment measures: Elevate heels  Goal: Prevent/manage excess moisture  Outcome: Progressing  Flowsheets (Taken 11/1/2024 1803)  Prevent/manage excess moisture:   Monitor for/manage infection if present   Follow provider orders for dressing changes   Cleanse incontinence/protect with barrier cream   Moisturize dry skin  Goal: Prevent/minimize sheer/friction injuries  Outcome: Progressing  Flowsheets (Taken 11/1/2024 1803)  Prevent/minimize sheer/friction injuries:   HOB 30 degrees or less   Turn/reposition every 2 hours/use positioning/transfer devices   Complete micro-shifts as needed if patient unable. Adjust patient position to relieve pressure points, not a full turn   Use pull sheet  Goal: Promote/optimize nutrition  Outcome: Progressing  Flowsheets (Taken 11/1/2024 1803)  Promote/optimize nutrition:   Discuss with provider if NPO > 2 days   Consume > 50% meals/supplements   Monitor/record intake including meals   Offer water/supplements/favorite foods  Goal: Promote skin healing  Outcome: Progressing  Flowsheets (Taken 11/1/2024 1803)  Promote skin healing:   Turn/reposition every 2 hours/use positioning/transfer devices   Protective dressings over bony prominences   Assess skin/pad under line(s)/device(s)     Problem: Pain  Goal: Takes deep breaths with improved pain control throughout the shift  Outcome: Progressing  Goal: Turns in bed with improved pain control throughout the  shift  Outcome: Progressing  Goal: Walks with improved pain control throughout the shift  Outcome: Progressing  Goal: Performs ADL's with improved pain control throughout shift  Outcome: Progressing  Goal: Participates in PT with improved pain control throughout the shift  Outcome: Progressing  Goal: Free from opioid side effects throughout the shift  Outcome: Progressing  Goal: Free from acute confusion related to pain meds throughout the shift  Outcome: Progressing     Problem: Pain - Adult  Goal: Verbalizes/displays adequate comfort level or baseline comfort level  Outcome: Progressing     Problem: Safety - Adult  Goal: Free from fall injury  Outcome: Progressing     Problem: Discharge Planning  Goal: Discharge to home or other facility with appropriate resources  Outcome: Progressing     Problem: Chronic Conditions and Co-morbidities  Goal: Patient's chronic conditions and co-morbidity symptoms are monitored and maintained or improved  Outcome: Progressing     Problem: Fall/Injury  Goal: Not fall by end of shift  Outcome: Progressing  Goal: Be free from injury by end of the shift  Outcome: Progressing  Goal: Verbalize understanding of personal risk factors for fall in the hospital  Outcome: Progressing  Goal: Verbalize understanding of risk factor reduction measures to prevent injury from fall in the home  Outcome: Progressing  Goal: Use assistive devices by end of the shift  Outcome: Progressing  Goal: Pace activities to prevent fatigue by end of the shift  Outcome: Progressing

## 2024-11-01 NOTE — NURSING NOTE
1600: LT 8 report called. Yury, son, was called and updated about patient being transport.    Patient belongings transported with the patient with telemetry and nurse assist

## 2024-11-01 NOTE — PROGRESS NOTES
Pharmacy Medication History Review    Tommy Richmond is a 89 y.o. male admitted for Hemopneumothorax on left. Pharmacy reviewed the patient's [id number] medications and allergies for accuracy.    Medications ADDED:  none  Medications CHANGED:  none  Medications REMOVED:   None      The list below reflects the updated PTA list.   Prior to Admission Medications   Prescriptions Last Dose Informant   alendronate (Fosamax) 70 mg tablet  Child   Sig: Take 1 tablet (70 mg) by mouth every 7 days. Take in the morning with a full glass of water, on an empty stomach, and do not take anything else by mouth or lie down for the next 30 min.   ammonium lactate (Lac-Hydrin) 12 % lotion  Child   Si Application once daily as needed. To lower legs and feet.   finasteride (Proscar) 5 mg tablet  Child   Sig: TAKE 1 TABLET EVERY DAY   furosemide (Lasix) 20 mg tablet  Child   Sig: Take 1 tablet (20 mg) by mouth once daily.   Patient not taking: Reported on 10/4/2024   losartan-hydrochlorothiazide (Hyzaar) 100-25 mg tablet  Child   Sig: Take 0.5 tablets by mouth once daily.   metoprolol tartrate (Lopressor) 25 mg tablet  Child   Sig: TAKE 1 TABLET EVERY DAY   multivit-min/FA/lycopen/lutein (SENTRY SENIOR ORAL)  Child   Sig: Take 1 tablet by mouth once daily.   oxybutynin XL (Ditropan-XL) 10 mg 24 hr tablet  Child   Sig: Take 1 tablet (10 mg) by mouth once daily.   potassium chloride CR 20 mEq ER tablet  Child   Sig: TAKE 1 TABLET EVERY DAY   tamsulosin (Flomax) 0.4 mg 24 hr capsule  Child   Sig: TAKE 1 CAPSULE AT BEDTIME   vit A/vit C/vit E/zinc/copper (PRESERVISION AREDS ORAL)  Child   Sig: Take 1 tablet by mouth once daily.   vits A,C,E/lutein/minerals (HEALTHY EYES ORAL)  Child   Sig: Take 1 tablet by mouth once daily.      Facility-Administered Medications: None        The list below reflects the updated allergy list. Please review each documented allergy for additional clarification and justification.  Allergies   "Reviewed by Clarisas Felipe, PharmD on 11/1/2024        Severity Reactions Comments    Iodinated Contrast Media Not Specified Unknown     Penicillins Low Rash, Hives, Unknown             Patient accepts M2B at discharge.     Sources:   [facility]  Pharmacy dispense history  Child  Chart Review  Care Everywhere    Additional Comments:  Spoke with patient- states that his son, Yury, and his wife manages his medications for him. Son was called and able to provided medication history  Furosemide was given as a short d/t some edema that doctor saw. Completed course. Pt does have refills in case this happens again though. Also had a short course of Antivert, but completed therapy and has not had any issues with vertigo since  Dose for Hyzaar was changed about 2 weeks ago from 1 whole tab to half a tablet.       Clarissa Felipe, PharmD  Transitions of Care Pharmacist  11/01/24     Secure Chat preferred   If no response call [phone] or [place] \"Med Rec\"      "

## 2024-11-01 NOTE — PROGRESS NOTES
Postop Pain HPI -   Palliative: relieved with IV analgesics and regional local anesthetics  Provocative: movement  Quality:  burning and aching  Radiation:  none  Severity:  8/10 (patient reports while sitting and during movement however per nursing patient has been asleep most of the day and has not been asking for pain medication)  Timing: constant    24-HOUR OPIOID CONSUMPTION:  Dilaudid 0.2 mg  Oxycodone 15 mg    Scheduled medications  acetaminophen, 650 mg, oral, q6h  enoxaparin, 30 mg, subcutaneous, BID  insulin lispro, 0-5 Units, subcutaneous, TID  levoFLOXacin, 250 mg, intravenous, q24h  lidocaine, 1 patch, transdermal, Daily  methocarbamol, 500 mg, oral, q8h SARA  metoprolol tartrate, 12.5 mg, oral, BID  polyethylene glycol, 17 g, oral, Daily  tamsulosin, 0.4 mg, oral, Daily      Continuous medications     PRN medications  PRN medications: HYDROmorphone, oxyCODONE, oxyCODONE, oxygen     Physical Exam:  Constitutional:  no distress, alert and cooperative  Eyes: clear sclera  Head/Neck: No apparent injury, trachea midline  Respiratory/Thorax: Patent airways, thorax symmetric, breathing comfortably  Cardiovascular: no pitting edema  Gastrointestinal: Nondistended  Musculoskeletal: ROM intact  Extremities: no clubbing  Neurological: alert, escalante x4  Psychological: Appropriate affect    Results for orders placed or performed during the hospital encounter of 10/31/24 (from the past 24 hours)   Renal Function Panel   Result Value Ref Range    Glucose 94 74 - 99 mg/dL    Sodium 143 136 - 145 mmol/L    Potassium 3.5 3.5 - 5.3 mmol/L    Chloride 116 (H) 98 - 107 mmol/L    Bicarbonate 20 (L) 21 - 32 mmol/L    Anion Gap 11 10 - 20 mmol/L    Urea Nitrogen 49 (H) 6 - 23 mg/dL    Creatinine 1.18 0.50 - 1.30 mg/dL    eGFR 60 (L) >60 mL/min/1.73m*2    Calcium 6.7 (L) 8.6 - 10.6 mg/dL    Phosphorus 3.0 2.5 - 4.9 mg/dL    Albumin 2.3 (L) 3.4 - 5.0 g/dL   Type And Screen   Result Value Ref Range    ABO TYPE B     Rh TYPE POS      ANTIBODY SCREEN NEG    CBC   Result Value Ref Range    WBC 11.3 4.4 - 11.3 x10*3/uL    nRBC 0.0 0.0 - 0.0 /100 WBCs    RBC 2.95 (L) 4.50 - 5.90 x10*6/uL    Hemoglobin 8.6 (L) 13.5 - 17.5 g/dL    Hematocrit 26.7 (L) 41.0 - 52.0 %    MCV 91 80 - 100 fL    MCH 29.2 26.0 - 34.0 pg    MCHC 32.2 32.0 - 36.0 g/dL    RDW 14.9 (H) 11.5 - 14.5 %    Platelets 236 150 - 450 x10*3/uL   Magnesium   Result Value Ref Range    Magnesium 2.16 1.60 - 2.40 mg/dL   CALCIUM, IONIZED   Result Value Ref Range    POCT Calcium, Ionized 1.13 1.1 - 1.33 mmol/L   POCT GLUCOSE   Result Value Ref Range    POCT Glucose 87 74 - 99 mg/dL   VERIFY ABO/Rh Group Test   Result Value Ref Range    ABO TYPE B     Rh TYPE POS    POCT GLUCOSE   Result Value Ref Range    POCT Glucose 81 74 - 99 mg/dL   POCT GLUCOSE   Result Value Ref Range    POCT Glucose 91 74 - 99 mg/dL   CBC   Result Value Ref Range    WBC 7.8 4.4 - 11.3 x10*3/uL    nRBC 0.0 0.0 - 0.0 /100 WBCs    RBC 2.59 (L) 4.50 - 5.90 x10*6/uL    Hemoglobin 7.6 (L) 13.5 - 17.5 g/dL    Hematocrit 23.7 (L) 41.0 - 52.0 %    MCV 92 80 - 100 fL    MCH 29.3 26.0 - 34.0 pg    MCHC 32.1 32.0 - 36.0 g/dL    RDW 14.9 (H) 11.5 - 14.5 %    Platelets 195 150 - 450 x10*3/uL   Basic metabolic panel   Result Value Ref Range    Glucose 89 74 - 99 mg/dL    Sodium 141 136 - 145 mmol/L    Potassium 4.1 3.5 - 5.3 mmol/L    Chloride 110 (H) 98 - 107 mmol/L    Bicarbonate 22 21 - 32 mmol/L    Anion Gap 13 mmol/L    Urea Nitrogen 47 (H) 6 - 23 mg/dL    Creatinine 1.25 0.50 - 1.30 mg/dL    eGFR 56 (L) >60 mL/min/1.73m*2    Calcium 8.1 (L) 8.6 - 10.6 mg/dL   Magnesium   Result Value Ref Range    Magnesium 1.98 1.60 - 2.40 mg/dL   Calcium, Ionized   Result Value Ref Range    POCT Calcium, Ionized 1.15 1.1 - 1.33 mmol/L   Troponin I, High Sensitivity   Result Value Ref Range    Troponin I, High Sensitivity (CMC) 46 0 - 53 ng/L   POCT GLUCOSE   Result Value Ref Range    POCT Glucose 94 74 - 99 mg/dL   POCT GLUCOSE   Result Value  Ref Range    POCT Glucose 72 (L) 74 - 99 mg/dL   POCT GLUCOSE   Result Value Ref Range    POCT Glucose 116 (H) 74 - 99 mg/dL   CBC   Result Value Ref Range    WBC 7.1 4.4 - 11.3 x10*3/uL    nRBC 0.0 0.0 - 0.0 /100 WBCs    RBC 2.68 (L) 4.50 - 5.90 x10*6/uL    Hemoglobin 7.9 (L) 13.5 - 17.5 g/dL    Hematocrit 24.4 (L) 41.0 - 52.0 %    MCV 91 80 - 100 fL    MCH 29.5 26.0 - 34.0 pg    MCHC 32.4 32.0 - 36.0 g/dL    RDW 14.8 (H) 11.5 - 14.5 %    Platelets 200 150 - 450 x10*3/uL       Tommy Richmond is a 89 y.o. year old male patient who presents for multiple rib fx after a fall and L chest wall hematoma with Teodoro Wilkerson MD on 10/31/2024. Acute Pain consulted for assistance with pain control.      Plan:  - Unfortunately the patient is not a candidate for a block at this time due to his large posterior chest wall hematoma and transverse process fracture  - Patient reports adequate pain control with current regimen of tylenol, dilaudid, oxycodone, robaxin, and lidocaine patch   - Consider below options if pain worsens:  - IV Toradol 30mg Q6H for 6 doses per surgical service  - IV Mg 2g Q8H x 3 doses  - IV Robaxin 1000mg QID  - IV dilaudid 0.2mg Q4H for breakthrough pain  - IV Tylenol 1000mg Q6H x 4 doses  - Ketamine 5 mg/hr for 24hrs   - Lidocaine 50 mg/hr, check lidocaine level after 6 hrs  - Precedex 0.1 mcg/kg/min for 24 hrs  Please draw a lidocaine level 6 hours after starting the infusion, from a different IV than the line that the lidocaine is infusing into. After that first level, you will have to draw lidocaine levels q24h.   - Continuous pulse ox  - Will continue to follow patient    Estuardo Sanchez, PGY1     Acute Pain Resident  pg [phone] ph [phone]

## 2024-11-01 NOTE — PROGRESS NOTES
[facility]  TRAUMA ICU - HISTORY AND PHYSICAL    Patient Name: Tommy Richmond  MRN: [id number]  Admit Date: 1031  : 1935  AGE: 89 y.o.   GENDER: male  ==============================================================================  MECHANISM OF INJURY / CHIEF COMPLAINT:     89-year-old male who presented after a ground-level fall of unknown mechanism. Due to patient's confusion, history obtained via chart review and conversation w/ patient's son.   LOC (yes/no?): No  Anticoagulant / Anti-platelet Rx? (for what dx?): No  Referring Facility Name (N/A for scene EMR run): [facility]    No acute overnight events    INJURIES:   Left Hemo/Pneumothorax  L1 Compression fracture  Fracture of left ribs 5-7    OTHER MEDICAL PROBLEMS:  Recurrent UTIs with stage III Kidney disease  HTN  Glaucoma  BPH    INCIDENTAL FINDINGS:  Complex kidney cyst  Cholelithiasis    ==============================================================================  ADMISSION PLAN OF CARE:    Updates 10/31  -Water seal chest tube  -Regular diet   -Geriatrics consult  -Transfer to trauma floor    NEURO/PAIN/SEDATION:   #Acute pain  -Pain consult -> Not a candidate for block   -Tylenol, oxycodone, robaxin, dilaudid, lidocaine patch    RESPIRATORY:   #Left hemo/pneumothorax   -L pigtail placed in ED  -Chest tube to water seal this morning   -Incentive spirometry, pulled 1250 this AM  -NC maintain sats above 92 %  -Aggressive pulmonary hygiene    CARDIOVASC:   #Hx HTN  -Hold home hypertensives    GI/FEN:  -Discontinue mIVF  -Regular Diet   -Miralax    :   #Hx BPH  -Cont home flowmax     HEMATOLOGIC:   #Acute blood loss anemia   -Hgb 7.6  -Transfuse for Hgb >7  -Monitor for signs of bleeding    ENDOCRINE:   -No active issues    MUSCULOSKELETAL/SKIN:   #L1 compression fracture   -No acute surgical intervention   -WBAT no restrictions or precautions   -T/L spin uprights when able  #Left sided 5th-7th rib  fxs  -Pigtail placed in ED  -Pain control as above     INFECTIOUS DISEASE:   #Recurrent UTI  -Asymptomatic on exam  -Levaquin Q24hr   -Gram positive bacilli urine culture  -Afebrile     GI PROPHYLAXIS: Not indicated     DVT PROPHYLAXIS: SCD     Lines/tubes: PIV, Left 14fr pigtail chest tube     DISPOSITION: Floor transfer    Edmundo Villa DO   Emergency Medicine PGY-2  Trauma ICU   [id number]    ==============================================================================  PAST MEDICAL HISTORY:   PMH: chronic UTIs, CKD stage III, glaucoma  Past Medical History:   Diagnosis Date    Personal history of other diseases of urinary system     History of bladder stone       PSH: Patient and son unable to provide history, as below  Past Surgical History:   Procedure Laterality Date    OTHER SURGICAL HISTORY  10/25/2019    Inguinal hernia repair    OTHER SURGICAL HISTORY  10/25/2019    Small bowel resection    OTHER SURGICAL HISTORY  10/25/2019    Colonoscopy    OTHER SURGICAL HISTORY  10/25/2019    Cystoscopy    OTHER SURGICAL HISTORY  10/25/2019    Tonsillectomy     FH: As below  Family History   Problem Relation Name Age of Onset    Colon cancer Father      Prostate cancer Father      Colon cancer Sister      Prostate cancer Brother       SOCIAL HISTORY:    Smoking: Never   Social History     Tobacco Use   Smoking Status Former    Types: Cigarettes   Smokeless Tobacco Never       Alcohol: Never   Social History     Substance and Sexual Activity   Alcohol Use Never       Drug use: None    MEDICATIONS: As below  Prior to Admission medications    Medication Sig Start Date End Date Taking? Authorizing Provider   alendronate (Fosamax) 70 mg tablet Take 1 tablet (70 mg) by mouth every 7 days. Take in the morning with a full glass of water, on an empty stomach, and do not take anything else by mouth or lie down for the next 30 min. 2/2/24 2/1/25  Brandan Mcclendon MD MPH   ammonium lactate (Lac-Hydrin) 12 % lotion 1  Application once daily as needed. To lower legs and feet. 2/14/24   Historical Provider, MD   finasteride (Proscar) 5 mg tablet TAKE 1 TABLET EVERY DAY 1/16/24   Chuck Cavanaugh MD   furosemide (Lasix) 20 mg tablet Take 1 tablet (20 mg) by mouth once daily.  Patient not taking: Reported on 10/4/2024 8/2/24 8/2/25  Brandan Mcclendon MD MPH   losartan-hydrochlorothiazide (Hyzaar) 100-25 mg tablet Take 0.5 tablets by mouth once daily. 10/4/24   Otoniel Klein MD   metoprolol tartrate (Lopressor) 25 mg tablet TAKE 1 TABLET EVERY DAY 1/12/24   Brandan Mcclendon MD MPH   multivit-min/FA/lycopen/lutein (SENTRY SENIOR ORAL) Take 1 tablet by mouth once daily.    Historical Provider, MD   oxybutynin XL (Ditropan-XL) 10 mg 24 hr tablet Take 1 tablet (10 mg) by mouth once daily. 9/26/24 9/26/25  Olegario Leo PA-C   potassium chloride CR 20 mEq ER tablet TAKE 1 TABLET EVERY DAY 1/12/24   Brandan Mcclendon MD MPH   tamsulosin (Flomax) 0.4 mg 24 hr capsule TAKE 1 CAPSULE AT BEDTIME 1/16/24   Chuck Cavanaugh MD   vit A/vit C/vit E/zinc/copper (PRESERVISION AREDS ORAL) Take 1 tablet by mouth once daily.    Historical Provider, MD   vits A,C,E/lutein/minerals (HEALTHY EYES ORAL) Take 1 tablet by mouth once daily.    Historical Provider, MD   latanoprost (Xalatan) 0.005 % ophthalmic solution Administer 1 drop into both eyes once daily at bedtime. 8/2/22 10/30/24  Historical Provider, MD     ALLERGIES: Contrast (hives), penicillin (hives)  Allergies   Allergen Reactions    Iodinated Contrast Media Unknown    Penicillins Rash, Hives and Unknown       PHYSICAL EXAM:  PRIMARY SURVEY:  Airway  Airway is patent.     Breathing  Breathing is normal.     Circulation  Cardiac rhythm is regular.   Pulses        SECONDARY SURVEY/PHYSICAL EXAM:  Physical Exam  Constitutional:       General: He is awake. He is not in acute distress.     Comments: Patient lying comfortably in bed, oriented to self/situation only. Amnestic to most  events of the last week. Is able to say that he fell at home.   HENT:      Head: Normocephalic and atraumatic.      Right Ear: External ear normal.      Left Ear: External ear normal.      Nose: Nose normal.      Mouth/Throat:      Mouth: Mucous membranes are moist.   Eyes:      Extraocular Movements: Extraocular movements intact.      Conjunctiva/sclera: Conjunctivae normal.      Pupils: Pupils are equal, round, and reactive to light.   Neck:      Comments: No midline cervical tenderness or stepoffs   Cardiovascular:      Rate and Rhythm: Normal rate and regular rhythm.      Pulses: Normal pulses.           Radial pulses are 2+ on the right side and 2+ on the left side.        Dorsalis pedis pulses are 2+ on the right side and 2+ on the left side.      Heart sounds: Normal heart sounds, S1 normal and S2 normal. Heart sounds not distant. No murmur heard.  Pulmonary:      Effort: Pulmonary effort is normal.      Breath sounds: Normal breath sounds.      Comments: Equal chest rise and fall.No respiratory distress.   Chest:      Comments: There is left lower chest wall ecchymosis with tenderness.   Abdominal:      General: Abdomen is flat. There is no distension.      Palpations: Abdomen is soft.      Tenderness: There is no abdominal tenderness.      Comments: There is Left quadrant ecchymosis extending to the midline abdomen.    Genitourinary:     Penis: Normal.    Musculoskeletal:         General: Normal range of motion.      Cervical back: Full passive range of motion without pain and normal range of motion. No tenderness.      Right lower leg: No edema.      Left lower leg: No edema.   Skin:     General: Skin is warm.   Neurological:      General: No focal deficit present.      Mental Status: He is alert. He is disoriented.      Cranial Nerves: No cranial nerve deficit.      Sensory: No sensory deficit.      Motor: No weakness.   Psychiatric:         Mood and Affect: Mood normal.         Behavior: Behavior is  cooperative.       LABS:  Results from last 7 days   Lab Units 11/01/24  0126 10/31/24  1615 10/31/24  0824   WBC AUTO x10*3/uL 7.8 11.3 10.2   HEMOGLOBIN g/dL 7.6* 8.6* 7.6*   HEMATOCRIT % 23.7* 26.7* 22.1*   PLATELETS AUTO x10*3/uL 195 236 192   NEUTROS PCT AUTO %  --   --  93.2   LYMPHS PCT AUTO %  --   --  2.2   MONOS PCT AUTO %  --   --  2.9   EOS PCT AUTO %  --   --  0.1         Results from last 7 days   Lab Units 11/01/24  0126 10/31/24  1417 10/30/24  1909   SODIUM mmol/L 141 143 140   POTASSIUM mmol/L 4.1 3.5 4.5   CHLORIDE mmol/L 110* 116* 110*   CO2 mmol/L 22 20* 23   BUN mg/dL 47* 49* 73*   CREATININE mg/dL 1.25 1.18 1.87*   CALCIUM mg/dL 8.1* 6.7* 8.6   PROTEIN TOTAL g/dL  --   --  5.6*   BILIRUBIN TOTAL mg/dL  --   --  1.1   ALK PHOS U/L  --   --  87   ALT U/L  --   --  27   AST U/L  --   --  29   GLUCOSE mg/dL 89 94 87     Results from last 7 days   Lab Units 10/30/24  1909   BILIRUBIN TOTAL mg/dL 1.1           I have reviewed all laboratory and imaging results ordered/pertinent for this encounter.

## 2024-11-01 NOTE — CONSULTS
Consults    Primary Team: Trauma     Admit Date: 10/31/2024    Emergency Contact:   Extended Emergency Contact Information  Primary Emergency Contact: Yury Richmond  Address: [address] [place] [address]            [place] [address] [facility]  Home Phone: [phone]  Work Phone: [phone]  Mobile Phone: [phone]  Relation: Son  Preferred language: English   needed? No     Reason For Consult: co-management    History Of Present Illness:  Tommy Richmond is a 89 y.o. male presenting with falls: found to have large abd wall hematoma, L pneumohemothorax, Fx L 5-7th ribs, s/p L pigtail CT placement 10/31/24.    PMH:  Htn, UTIs (gets betadine bladder irrigation once per month), colon ca s/p resection 20 yrs ago (no chemo, no xrt), bph, bladder stones, glaucoma/mag deg/poor vision, gait difficulty/uses walker, CKD stge 3, appy, hernia surg    History per patient:  Pt reports he has had a few falls but unclear circumstances of the falls. Pt denies recent URI, dizziness, cp, diarrhea, palps, dysuria, fever. Pt reports he has poor vision due to glaucoma/mag deg. Complains of R sided abd pain.     Able to report he walks w walker. Has [facility]HA to help w bathing, laundry, housekeeping. Able to go to bathroom on his own.  Reports he lives w wife Janette who has Alz dementia.  No pets. Gets MOW. Son Yury helps w groceries, dtr in law [employer] prepours meds each week and pt takes meds on his own. Stopped driving 2 yrs ago bec no longer felt safe driving. Buddhism friends help to drive pt to appts. Confirms olman Cotton is POA. Yruy manages finances    History per family/caregiver: (obtained in addition due to patient’s mild confusion)  Spoke w son Yury: confirms pt lives w wife w dementia who is more physically able than pt. Has HHA thru [facility] M-F from 5-8pm.  Yury has already looked into group home for pt and wife but decided to try having HHA first.  Reports pt has had 1 wk hx of getting more confused, which occurs  when pt gets UTI.  Reports pt fell at least twice: once in bathroom, fell to knees and went to ER. Then after he returned back home, when  came to house found pt on the floor and returned to ER where pt was found to have L Ptx, rib fx, abd wall hematoma.   Yury reports family/friends are currently staying w pt's wife as she can not be left alone.     What matters most to the patient:  Not to fall     Prior to Admission Meds  Prior to Admission Medications   Prescriptions Last Dose Informant Patient Reported? Taking?   alendronate (Fosamax) 70 mg tablet  Child No No   Sig: Take 1 tablet (70 mg) by mouth every 7 days. Take in the morning with a full glass of water, on an empty stomach, and do not take anything else by mouth or lie down for the next 30 min.   ammonium lactate (Lac-Hydrin) 12 % lotion  Child Yes No   Si Application once daily as needed. To lower legs and feet.   finasteride (Proscar) 5 mg tablet  Child No No   Sig: TAKE 1 TABLET EVERY DAY   furosemide (Lasix) 20 mg tablet  Child No No   Sig: Take 1 tablet (20 mg) by mouth once daily.   Patient not taking: Reported on 10/4/2024   losartan-hydrochlorothiazide (Hyzaar) 100-25 mg tablet  Child No No   Sig: Take 0.5 tablets by mouth once daily.   metoprolol tartrate (Lopressor) 25 mg tablet  Child No No   Sig: TAKE 1 TABLET EVERY DAY   multivit-min/FA/lycopen/lutein (SENTRY SENIOR ORAL)  Child Yes No   Sig: Take 1 tablet by mouth once daily.   oxybutynin XL (Ditropan-XL) 10 mg 24 hr tablet  Child No No   Sig: Take 1 tablet (10 mg) by mouth once daily.   potassium chloride CR 20 mEq ER tablet  Child No No   Sig: TAKE 1 TABLET EVERY DAY   tamsulosin (Flomax) 0.4 mg 24 hr capsule  Child No No   Sig: TAKE 1 CAPSULE AT BEDTIME   vit A/vit C/vit E/zinc/copper (PRESERVISION AREDS ORAL)  Child Yes No   Sig: Take 1 tablet by mouth once daily.   vits A,C,E/lutein/minerals (HEALTHY EYES ORAL)  Child Yes No   Sig: Take 1 tablet by mouth once daily.       Facility-Administered Medications: None        Current Meds in Hospital  Current Facility-Administered Medications   Medication Dose Route Frequency Provider Last Rate Last Admin    acetaminophen (Tylenol) tablet 650 mg  650 mg oral q6h Melba Castillo MD   650 mg at 11/01/24 0855    enoxaparin (Lovenox) syringe 30 mg  30 mg subcutaneous BID Melba Castillo MD   30 mg at 11/01/24 1006    HYDROmorphone PF (Dilaudid) injection 0.2 mg  0.2 mg intravenous q3h PRN Melba Castillo MD   0.2 mg at 10/31/24 1627    insulin lispro injection 0-5 Units  0-5 Units subcutaneous TID Edmundo Villa DO        levoFLOXacin (Levaquin) 250 mg in dextrose 5% IV 50 mL  250 mg intravenous q24h Melba Castillo MD   Stopped at 11/01/24 1012    lidocaine 4 % patch 1 patch  1 patch transdermal Daily Melba Castillo MD   1 patch at 11/01/24 0819    methocarbamol (Robaxin) tablet 500 mg  500 mg oral q8h SARA Melba Castillo MD   500 mg at 11/01/24 1316    metoprolol tartrate (Lopressor) tablet 12.5 mg  12.5 mg oral BID Albino León MD   12.5 mg at 11/01/24 0820    oxyCODONE (Roxicodone) immediate release tablet 10 mg  10 mg oral q4h PRN Melba Castillo MD   10 mg at 11/01/24 0820    oxyCODONE (Roxicodone) immediate release tablet 5 mg  5 mg oral q4h PRN Melba Castillo MD   5 mg at 11/01/24 0058    oxygen (O2) therapy   inhalation Continuous PRN - O2/gases Edmundo Villa DO   2 L/min at 11/01/24 0800    polyethylene glycol (Glycolax, Miralax) packet 17 g  17 g oral Daily Edmundo Villa DO        tamsulosin (Flomax) 24 hr capsule 0.4 mg  0.4 mg oral Daily Melba Castillo MD   0.4 mg at 11/01/24 0820        The patient's outpatient electronic medical record (EMR) is reviewed, .      Past Medical History  Past Medical History:   Diagnosis Date    Personal history of other diseases of urinary system     History of bladder stone        Surgical History  Past Surgical History:   Procedure Laterality Date    OTHER  SURGICAL HISTORY  10/25/2019    Inguinal hernia repair    OTHER SURGICAL HISTORY  10/25/2019    Small bowel resection    OTHER SURGICAL HISTORY  10/25/2019    Colonoscopy    OTHER SURGICAL HISTORY  10/25/2019    Cystoscopy    OTHER SURGICAL HISTORY  10/25/2019    Tonsillectomy        Family History  His family history includes Colon cancer in his father and sister; Prostate cancer in his brother and father.     Social History  He reports that he has quit smoking. His smoking use included cigarettes. He has never used smokeless tobacco. He reports that he does not drink alcohol and does not use drugs.  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  Current living environment: lives w wife    Activities of Daily Living:  Basic ADLs: (I= independent, A= assistance, D= dependent)  Bathing: A, Dressing: I, Toileting: I, Transferring: I, Continence: I, Feeding: I    Gandhi Index:  1  Instrumental ADLs: (I= independent, A= assistance, D= dependent)  Ability to use phone: I, Shopping: D, Cooking: A, Housekeeping: D, Laundry: D, Transportation: D, Medications: A, Handle Finances: D   [place] Scale:  1    Allergies  Iodinated contrast media and Penicillins    Review of Systems  See HPI       Documents on file and valid:   Health Care Power of : pt reports his son Yury  Code Status: Full Code      Confusion Assessment Method (CAM)  Not on file.    [place] Cognitive Assessment (MoCA)  Not on file.    Geriatric Depression Scale (GDS)  Not on file.    Mini-Cog (Early Dementia Detection)  Not on file.    Folstein Mini-Mental State Exam (MMSE)  Not on file.    Patient Health Questionnaire (PHQ-9)  Not  on file.     Physical Exam    Pleasant WM in nad  Keeps eyes closed- able to open them  Knows in hospital in [place]  Knows his , age  Unable to recall days of week backwards  Lungs dec BS, L pigtail CT in place  CV: s1 s2  Abd soft: lower area  w large extensive purple hematoma  Ext able to move legs, no edema  No conley noted      Last Recorded Vitals      2024    10:00 AM 2024    11:00 AM 2024    11:42 AM 2024    11:44 AM 2024    12:00 PM 2024    12:14 PM 2024     1:00 PM   Vitals   Systolic 125 115  115 117 118 111   Diastolic 60 54  54 68 63 62   Heart Rate 73 79  84 84 73 74   Temp   36.4 °C (97.5 °F)       Resp 8 23  17 16 15 12      Vitals:    10/31/24 0554   Weight: 65.8 kg (145 lb)        Confusion Assessment Method(CAM) for diagnosis of delirium:    1.  Acute onset or fluctuating course: absent  2.  Inattention: absent  3.  Disorganized thinking: absent  4.  Altered level of consciousness: absent  CAM: negative    AT Score For Assessment of Delirium and Cognitive Impairment:    Alertness: 0  Normal(fully alert,but not agitated, throughout assessment)=0  Mild sleepiness for <10 seconds after walking, then normal=0  Clearly abnormal=4  2.  AMT4: 0  No mistakes=0  One mistake=1  Two or more mistakes/untestable=2  3.  Attention: 1  Achieves seven months or more correctly=0  Starts but scores <7 months/ refuses to start=1  Untestable(cannot start because unwell, drowsy, inattentive)=2  4.  Acute: 0  No=0  Yes=4    Total Score: 1  4 or above: Possible delirium +/- cognitive impairment  1-3: Possible cognitive impairment  0: Delirium or severe cognitive impairment unlikely(but delirium still possible if (4) information incomplete)     Relevant Results  Lab Results   Component Value Date    TSH 1.47 2024    [id number] 236 2024     Results from last 7 days   Lab Units 24  1247 24  0126 10/31/24  1615 10/31/24  1417 10/31/24  0824 10/30/24  1909   WBC AUTO  x10*3/uL 7.1 7.8 11.3  --    < > 7.8   HEMOGLOBIN g/dL 7.9* 7.6* 8.6*  --    < > 8.2*   HEMATOCRIT % 24.4* 23.7* 26.7*  --    < > 26.1*   ALT U/L  --   --   --   --   --  27   AST U/L  --   --   --   --   --  29   SODIUM mmol/L  --  141  --  143  --  140   POTASSIUM mmol/L  --  4.1  --  3.5  --  4.5   CHLORIDE mmol/L  --  110*  --  116*  --  110*   CREATININE mg/dL  --  1.25  --  1.18  --  1.87*   BUN mg/dL  --  47*  --  49*  --  73*   CO2 mmol/L  --  22  --  20*  --  23    < > = values in this interval not displayed.       Recent Imaging Results      ECG 12 lead  Normal sinus rhythm  Left axis deviation  Nonspecific intraventricular conduction delay  T wave abnormality, consider inferior ischemia  Abnormal ECG  When compared with ECG of 14-SEP-2024 17:26,  Nonspecific intraventricular conduction delay has replaced Incomplete right bundle branch block  Borderline criteria for Lateral infarct are no longer Present  See ED provider note for full interpretation and clinical correlation  Confirmed by Lucía Galindo ([id number]) on 11/1/2024 12:54:02 PM  XR chest 1 view  Narrative: Interpreted By:  Rupa Hayden,   STUDY:  XR CHEST 1 VIEW; 11/1/2024 6:30 am      INDICATION:  Signs/Symptoms:Chest tube.      COMPARISON:  Radiograph dated 10/31/2024 and CT dated 10/30/2024      ACCESSION NUMBER(S):  [id number]      ORDERING CLINICIAN:  KOSTAS TAVERA      FINDINGS:  A left basilar pigtail chest tube is in place.      The cardiac silhouette size is within normal limits. Calcification of  the aortic knob.      Left basilar and retrocardiac opacity. Blunting of the left  costophrenic angle. Mild interstitial prominence. No sizable  pneumothorax.      No acute osseous abnormality.      Impression: 1. Persistent small left pleural effusion with left basilar  consolidation. Correlation with concern for infection. Left pigtail  chest tube is in unchanged position.              Signed by: Rupa Garcia 11/1/2024  12:05 PM  Dictation workstation:   [id number]      Head/Brain Imaging  === Results for orders placed during the hospital encounter of 10/30/24 ===    CT head wo IV contrast [[id number]] 10/31/2024    Status: Normal  1.  No acute intracranial hemorrhage or depressed calvarial fracture.  2.  No acute fracture at the cervical spine. Degenerative changes.  3. Partially visualized left-sided hydropneumothorax, better  evaluated on prior CT chest, abdomen, pelvis.        MACRO:  None.    Signed by: Toya Javier 10/31/2024 1:45 AM  Dictation workstation:   [id number]  No results found for this or any previous visit.        DATA:  EKG: QTC  Encounter Date: 10/30/24   ECG 12 lead   Result Value    Ventricular Rate 86    Atrial Rate 86    UT Interval 190    QRS Duration 124    QT Interval 366    QTC Calculation(Bazett) 437    P Axis 24    R Axis -42    T Axis -74    QRS Count 14    Q Onset 225    P Onset 130    P Offset 189    T Offset 408    QTC Fredericia 412    Narrative    Normal sinus rhythm  Left axis deviation  Nonspecific intraventricular conduction delay  T wave abnormality, consider inferior ischemia  Abnormal ECG  When compared with ECG of 14-SEP-2024 17:26,  Nonspecific intraventricular conduction delay has replaced Incomplete right bundle branch block  Borderline criteria for Lateral infarct are no longer Present  See ED provider note for full interpretation and clinical correlation  Confirmed by Lucía Galindo ([id number]) on 11/1/2024 12:54:02 PM     Anti-psychotics in 48 hours: none  Opioids/Benzodiazepines in 48 hours: oxycodone, dilaudid  Anticholinergics on board:No  Restraints:No  Indwelling catheters:No  Last BM:  UO in 24 hours:  Activity in the past 24 hours:  Need for ambulatory devices:    Assessment/Plan   This is a/an 89 y.o. year old male, with past medical history relevant for recurrent UTIs, BPH, colon ca s/p resection, poor vision, gait difficulty, htn, presenting for falls w L multiple rib  fx,  large posterior chest/abd wall hematoma, L ptx/rosalio, s/p L pigtail CT placement 10/31/24, being seen in geriatric consultation for comanagement.     Mild delirium   Pain due to multiple L rib fx, large abd wall hematoma, L CT placement for hemopneumothorax  Gait difficulty/poor vision contributing to falls  BPH  Recurrent UTIs, current Ucx + GNB over 10K  CKD stge 3  Dispo      -agree w tylenol and lidocaine patch  -consider decrease robaxin to q 8 prn to minimize delirium  -consider adding oxycodone 2.5mg  q 4 prn and discontinuing 10mg  q 6 prn dose to minimize delirium while still treating pain  -on levaquin: follow up on Ucx Sensitivities  -monitor for urinary retention  -son Yury is already planning for [facility]: counseled pt may need SNF for subacute rehab care first  -confirmed full code status w pt/son        Geriatric medicine will continue to follow the patient. Thank you for allowing geriatric medicine to be involved in the care of your patient. Geriatric medicine consultation team is available during work hours Monday through Friday. For any emergency issues requiring immediate assistance over the weekend, please page Geriatrics pager [phone]    Consult Billing Time  Prep time on date of patient encounter(minutes):  Time directly with patient/family/caregiver(minutes):  Documentation time(minutes):  TOTAL TIME(minutes):    Little Lamar MD

## 2024-11-01 NOTE — CARE PLAN
Problem: Skin  Goal: Decreased wound size/increased tissue granulation at next dressing change  Outcome: Progressing  Goal: Participates in plan/prevention/treatment measures  Outcome: Progressing  Goal: Prevent/manage excess moisture  Outcome: Progressing  Goal: Prevent/minimize sheer/friction injuries  Outcome: Progressing  Goal: Promote/optimize nutrition  Outcome: Progressing  Goal: Promote skin healing  Outcome: Progressing     Problem: Pain  Goal: Takes deep breaths with improved pain control throughout the shift  Outcome: Progressing  Goal: Turns in bed with improved pain control throughout the shift  Outcome: Progressing  Goal: Walks with improved pain control throughout the shift  Outcome: Progressing  Goal: Performs ADL's with improved pain control throughout shift  Outcome: Progressing  Goal: Participates in PT with improved pain control throughout the shift  Outcome: Progressing  Goal: Free from opioid side effects throughout the shift  Outcome: Progressing  Goal: Free from acute confusion related to pain meds throughout the shift  Outcome: Progressing     Problem: Pain - Adult  Goal: Verbalizes/displays adequate comfort level or baseline comfort level  Outcome: Progressing     Problem: Safety - Adult  Goal: Free from fall injury  Outcome: Progressing     Problem: Discharge Planning  Goal: Discharge to home or other facility with appropriate resources  Outcome: Progressing     Problem: Chronic Conditions and Co-morbidities  Goal: Patient's chronic conditions and co-morbidity symptoms are monitored and maintained or improved  Outcome: Progressing     Problem: Fall/Injury  Goal: Not fall by end of shift  Outcome: Progressing  Goal: Be free from injury by end of the shift  Outcome: Progressing  Goal: Verbalize understanding of personal risk factors for fall in the hospital  Outcome: Progressing  Goal: Verbalize understanding of risk factor reduction measures to prevent injury from fall in the  home  Outcome: Progressing  Goal: Use assistive devices by end of the shift  Outcome: Progressing  Goal: Pace activities to prevent fatigue by end of the shift  Outcome: Progressing

## 2024-11-01 NOTE — PROGRESS NOTES
[facility]  TRAUMA SERVICE - PROGRESS NOTE    Patient Name: Tommy Richmond  MRN: [id number]  Admit Date: 1031  : 1935  AGE: 89 y.o.   GENDER: male  ==============================================================================  MECHANISM OF INJURY:   89-year-old male who presented after a ground-level fall of unknown mechanism. Due to patient's confusion, history obtained via chart review and conversation w/ patient's son.   LOC (yes/no?): No  Anticoagulant / Anti-platelet Rx? (for what dx?): No  Referring Facility Name (N/A for scene EMR run): [facility]     No acute overnight events     INJURIES:   Left Hemo/Pneumothorax  L1 Compression fracture  Fracture of left ribs 5-7     OTHER MEDICAL PROBLEMS:  Recurrent UTIs with stage III Kidney disease  HTN  Glaucoma  BPH     INCIDENTAL FINDINGS:  Complex kidney cyst  Cholelithiasis    ==============================================================================  TODAY'S ASSESSMENT AND PLAN OF CARE:  89 y/o male s/p fall with L rib fractures and associated hemopneumothorax who was admitted to the TICU for pulmonary monitoring    - Chest tube to [facility]  - geriatrics consult  - ok for floor    Mark Humphries MD  PGY4 General Surgery  Trauma Surgery [id number]    ==============================================================================  CHIEF COMPLAINT / OVERNIGHT EVENTS:   NAEO.    MEDICAL HISTORY / ROS:  Admission history and ROS reviewed. Pertinent changes as follows:    PHYSICAL EXAM:  Heart Rate:  []   Temp:  [36.3 °C (97.3 °F)-37.5 °C (99.5 °F)]   Resp:  [8-32]   BP: ()/()   SpO2:  [93 %-100 %]   Physical Exam  Constitutional:       General: He is awake. He is not in acute distress.     Comments: Patient lying comfortably in bed, oriented to self/situation only. Amnestic to most events of the last week. Is able to say that he fell at home.   HENT:      Head: Normocephalic and atraumatic.       Right Ear: External ear normal.      Left Ear: External ear normal.      Nose: Nose normal.      Mouth/Throat:      Mouth: Mucous membranes are moist.   Eyes:      Extraocular Movements: Extraocular movements intact.      Conjunctiva/sclera: Conjunctivae normal.      Pupils: Pupils are equal, round, and reactive to light.   Cardiovascular:      Rate and Rhythm: Normal rate and regular rhythm.      Pulses: Normal pulses.           Radial pulses are 2+ on the right side and 2+ on the left side.        Dorsalis pedis pulses are 2+ on the right side and 2+ on the left side.      Heart sounds: Normal heart sounds, S1 normal and S2 normal. Heart sounds not distant. No murmur heard.  Pulmonary:      Effort: Pulmonary effort is normal.      Breath sounds: Normal breath sounds.      Comments: Equal chest rise and fall.No respiratory distress.   Chest:      Comments: There is left lower chest wall ecchymosis with tenderness.   Abdominal:      General: Abdomen is flat. There is no distension.      Palpations: Abdomen is soft.      Tenderness: There is no abdominal tenderness.      Comments: There is Left quadrant ecchymosis extending to the midline abdomen.    Genitourinary:     Penis: Normal.    Musculoskeletal:         General: Normal range of motion.      Cervical back: Full passive range of motion without pain and normal range of motion. No tenderness.      Right lower leg: No edema.      Left lower leg: No edema.   Skin:     General: Skin is warm.   Neurological:      General: No focal deficit present.      Mental Status: He is alert. He is disoriented.      Cranial Nerves: No cranial nerve deficit.      Sensory: No sensory deficit.      Motor: No weakness.   Psychiatric:         Mood and Affect: Mood normal.         Behavior: Behavior is cooperative.    IMAGING SUMMARY:  (summary of new imaging findings, not a copy of dictation)  CXR with improved aeration and no pneumothorax    LABS:  Results from last 7 days   Lab  Units 11/01/24  1247 11/01/24  0126 10/31/24  1615 10/31/24  0824   WBC AUTO x10*3/uL 7.1 7.8 11.3 10.2   HEMOGLOBIN g/dL 7.9* 7.6* 8.6* 7.6*   HEMATOCRIT % 24.4* 23.7* 26.7* 22.1*   PLATELETS AUTO x10*3/uL 200 195 236 192   NEUTROS PCT AUTO %  --   --   --  93.2   LYMPHS PCT AUTO %  --   --   --  2.2   MONOS PCT AUTO %  --   --   --  2.9   EOS PCT AUTO %  --   --   --  0.1         Results from last 7 days   Lab Units 11/01/24  0126 10/31/24  1417 10/30/24  1909   SODIUM mmol/L 141 143 140   POTASSIUM mmol/L 4.1 3.5 4.5   CHLORIDE mmol/L 110* 116* 110*   CO2 mmol/L 22 20* 23   BUN mg/dL 47* 49* 73*   CREATININE mg/dL 1.25 1.18 1.87*   CALCIUM mg/dL 8.1* 6.7* 8.6   PROTEIN TOTAL g/dL  --   --  5.6*   BILIRUBIN TOTAL mg/dL  --   --  1.1   ALK PHOS U/L  --   --  87   ALT U/L  --   --  27   AST U/L  --   --  29   GLUCOSE mg/dL 89 94 87     Results from last 7 days   Lab Units 10/30/24  1909   BILIRUBIN TOTAL mg/dL 1.1           I have reviewed all medications, laboratory results, and imaging pertinent for today's encounter.

## 2024-11-01 NOTE — PROGRESS NOTES
Occupational Therapy    Evaluation/Treatment    Patient Name: Tommy Richmond  MRN: [id number]  Department: [facility]  Room: 01/01-A  Today's Date: 11/01/24  Time Calculation  Start Time: 1340  Stop Time: 1422  Time Calculation (min): 42 min       Assessment:  OT Assessment: Pt dmeonstrated deficits in BUE strength, sitting balance, posture, standing balance, transfers, bed mobility, and ADL./IADLs.  Prognosis: Good  Barriers to Discharge: Decreased caregiver support  Evaluation/Treatment Tolerance: Patient limited by fatigue  Medical Staff Made Aware: Yes  End of Session Communication: Bedside nurse  End of Session Patient Position: Bed, 3 rail up, Alarm on  OT Assessment Results: Decreased ADL status, Decreased upper extremity strength, Decreased endurance, Decreased fine motor control, Decreased functional mobility, Decreased gross motor control, Decreased IADLs  Prognosis: Good  Barriers to Discharge: Decreased caregiver support  Evaluation/Treatment Tolerance: Patient limited by fatigue  Medical Staff Made Aware: Yes  Strengths: Attitude of self  Barriers to Participation: Ability to acquire knowledge, Comorbidities, Support of Caregivers  Plan:  Treatment Interventions: ADL retraining, Functional transfer training, UE strengthening/ROM, Endurance training, Patient/family training, Equipment evaluation/education, Neuromuscular reeducation, Fine motor coordination activities, Compensatory technique education  OT Frequency: 3 times per week  OT Discharge Recommendations: Moderate intensity level of continued care  OT Recommended Transfer Status: Assist of 2  OT - OK to Discharge: Yes  Treatment Interventions: ADL retraining, Functional transfer training, UE strengthening/ROM, Endurance training, Patient/family training, Equipment evaluation/education, Neuromuscular reeducation, Fine motor coordination activities, Compensatory technique education    Subjective   Current Problem:  1. Hemopneumothorax on left  Chest  Tube Insertion    Chest Tube Insertion      2. Closed fracture of multiple ribs of left side, initial encounter        3. Hypoxia        4. Closed compression fracture of body of L1 vertebra (Multi)          General:   OT Received On: 11/01/24  General  Reason for Referral: GLF: 1. Left Hemo/Pneumothorax  2. L1 Compression fracture  3. Fracture of left ribs 5-7  Past Medical History Relevant to Rehab: 1. Recurrent UTIs with stage III Kidney disease  2. HTN  3. Glaucoma  4. BPH  Missed Visit: No  Family/Caregiver Present: No  Prior to Session Communication: Bedside nurse  Patient Position Received: Bed, 3 rail up  Preferred Learning Style: auditory, verbal, visual  General Comment: Pt agreeable to therapy. Pt lines: PIV, chest tube.  Precautions:  Medical Precautions: Fall precautions, Spinal precautions, Chest tube            Pain:  Pain Assessment  Pain Assessment: 0-10  0-10 (Numeric) Pain Score: 0 - No pain    Objective   Cognition:  Overall Cognitive Status: Within Functional Limits  Orientation Level: Oriented X4  Attention: Within Functional Limits  Memory: Within Funtional Limits  Safety/Judgement: Within Functional Limits  Insight: Within function limits  Impulsive: Within functional limits  Processing Speed: Delayed           Home Living:  Type of Home: House  Lives With: Spouse (wife has alzhimers)  Home Adaptive Equipment: Walker rolling or standard, Wheelchair-manual  Home Layout: Able to live on main level with bedroom/bathroom  Home Access: Stairs to enter with rails  Entrance Stairs-Number of Steps: 3-4  Bathroom Shower/Tub: Walk-in shower  Bathroom Toilet: Standard  Bathroom Equipment: Grab bars in shower, Grab bars around toilet, Built-in shower seat  Home Living Comments: Pt reports HHA present Mon-Fri for several hours/day. Has basement but pt reports he does not have to go into basement  Prior Function:  Level of [place]: Independent with ADLs and functional transfers  Receives Help From:  Other (Comment) (HHA)  ADL Assistance:  (HHA)  Homemaking Assistance:  (HHA)  Ambulatory Assistance:  (HHA, lift chair)  Prior Function Comments: Pt reports a friend from [employer] drives him as needed to appointments  IADL History:     ADL:  Eating Assistance: Modified independent (Device)  Grooming Assistance: Minimal (Anticipated)  Bathing Assistance: Maximal (Anticipated)  UE Dressing Assistance: Minimal (Anticipated)  LE Dressing Assistance: Maximal (Anticipated)  Toileting Assistance with Device: Maximal (Anticipated)  Activities of Daily Living: Feeding  Feeding Adaptive Equipment: Built up utensils  Feeding Level of Assistance: Modified independent  Feeding Where Assessed: Bed level  Feeding Comments: Pt completed feeding a bed level with MOD I with built up handels and BUE propped up on blankets.  Activity Tolerance:  Endurance: Tolerates 30 min exercise with multiple rests  Early Mobility/Exercise Safety Screen: Proceed with mobilization - No exclusion criteria met  Functional Standing Tolerance:     Bed Mobility/Transfers: Bed Mobility  Bed Mobility: Yes  Bed Mobility 1  Bed Mobility 1: Supine to sitting  Level of Assistance 1: Moderate assistance, +2  Bed Mobility Comments 1: LOG roll for spinal precautions, pain with roll  Bed Mobility 2  Bed Mobility  2: Sitting to supine  Level of Assistance 2: Maximum assistance  Bed Mobility Comments 2: MAX A X2 for sit to supine.    Transfers  Transfer: Yes  Transfer 1  Transfer From 1: Sit to, Stand to  Transfer to 1: Stand, Sit  Technique 1: Sit to stand, Stand to sit  Transfer Device 1: Walker  Transfer Level of Assistance 1: Maximum assistance  Trials/Comments 1: Pt was MAX A X2 for sit to stand X2 trials.    Sitting Balance:  Static Sitting Balance  Static Sitting-Balance Support: Bilateral upper extremity supported, Feet supported  Static Sitting-Level of Assistance: Moderate assistance, Contact guard  Static Sitting-Comment/Number of Minutes: Pt sat EOB for for  approximatey 10 minutes with MOD A and brielf bouts of CGA, tactile cues ofr posture. Kyphotic    Vision:Vision - Basic Assessment  Current Vision: No visual deficits  Visual History: Glaucoma, Macular degeneration  Sensation:  Light Touch: No apparent deficits  Strength:  Strength Comments: 3/5 strength BUE WFL oberved through functional activity.  Other Activity:     Home Environment:     Perception:  Inattention/Neglect: Appears intact  Initiation: Appears intact  Motor Planning: Appears intact  Perseveration: Not present  Coordination:  Movements are Fluid and Coordinated: No   Hand Function:  Hand Function  Gross Grasp: Functional  Coordination: Functional  Extremities: RUE   RUE :  (ROM WFL), LUE   LUE:  (ROM WFL),  , and      Outcome Measures: [facility] Daily Activity  Putting on and taking off regular lower body clothing: Total  Bathing (including washing, rinsing, drying): Total  Putting on and taking off regular upper body clothing: Total  Toileting, which includes using toilet, bedpan or urinal: Total  Taking care of personal grooming such as brushing teeth: A lot  Eating Meals: A little  Daily Activity - Total Score: 9        , Confusion Assessment Method-ICU (CAM-ICU)  Feature 1: Acute Onset or Fluctuating Course: Negative  Feature 2: Inattention: Negative  Feature 4: Disorganized Thinking: Negative  Overall CAM-ICU: Negative  , and Early Mobility/Exercise Safety Screen: Proceed with mobilization - No exclusion criteria met  ICU Mobility Scale: Standing [4]    Education Documentation  Body Mechanics, taught by JOY Ponce at 11/1/2024  3:40 PM.  Learner: Patient  Readiness: Acceptance  Method: Explanation  Response: Verbalizes Understanding    Precautions, taught by JOY Ponce at 11/1/2024  3:40 PM.  Learner: Patient  Readiness: Acceptance  Method: Explanation  Response: Verbalizes Understanding    ADL Training, taught by JOY Ponce at 11/1/2024  3:40 PM.  Learner: Patient  Readiness:  Acceptance  Method: Explanation  Response: Verbalizes Understanding    Education Comments  No comments found.        OP EDUCATION:       Goals:  Encounter Problems       Encounter Problems (Active)       ADLs       Patient will perform UB and LB bathing with minimal assist  level of assistance. (Progressing)       Start:  11/01/24    Expected End:  11/22/24            Patient with complete upper body dressing with independent level of assistance donning and doffing all UE clothes with no adaptive equipment while edge of bed  (Progressing)       Start:  11/01/24    Expected End:  11/22/24            Patient with complete lower body dressing with stand by assist level of assistance donning and doffing all LE clothes  with no adaptive equipment while edge of bed  (Progressing)       Start:  11/01/24    Expected End:  11/22/24            Patient will complete daily grooming tasks brushing teeth and washing face/hair with independent level of assistance and PRN adaptive equipment while edge of bed . (Progressing)       Start:  11/01/24    Expected End:  11/22/24               COGNITION/SAFETY       Patient will recall and adhere to spinal precautions during all functional mobility/ADL tasks in order to demonstrate improved understanding and promote healing post op (Progressing)       Start:  11/01/24    Expected End:  11/22/24               TRANSFERS       Patient will perform bed mobility stand by assist level of assistance in order to improve safety and independence with mobility (Progressing)       Start:  11/01/24    Expected End:  11/22/24            Patient will complete functional transfer to chair with least restrictive device with minimal assist  level of assistance. (Progressing)       Start:  11/01/24    Expected End:  11/22/24 11/01/24 at 4:00 PM - JOY CLARK

## 2024-11-01 NOTE — PROGRESS NOTES
Physical Therapy    Physical Therapy Evaluation & Treatment    Patient Name: Tommy Richmond  MRN: [id number]  [place] [facility]  Room: 01/01-A  Today's Date: 11/1/2024   Time Calculation  Start Time: 1144  Stop Time: 1214  Time Calculation (min): 30 min    Assessment/Plan   PT Assessment  PT Assessment Results: Decreased strength, Decreased endurance, Impaired balance, Decreased mobility, Decreased coordination  Rehab Prognosis: Good  Barriers to Discharge: Non ambulatory on eval. HHA intermittently, h/o falls  Evaluation/Treatment Tolerance: Patient tolerated treatment well  Medical Staff Made Aware: Yes  End of Session Communication: Bedside nurse  Assessment Comment: Pt is an 88 yo male admitted s/p fall with L1 compression fx, rib fxs, hemo/pneumothorax. Upon PT eval, pt presents with the stated deficits. Decreased hip extensor strength noted which inhibited closed chain functional strength for standing. Pt retropulsive and unable to ambuate on eval. Skilled PT indicated to progress safety and independence with mobility.  End of Session Patient Position: Bed, 3 rail up, Alarm on   IP OR SWING BED PT PLAN  Inpatient or Swing Bed: Inpatient  PT Plan  Treatment/Interventions: Bed mobility, Transfer training, Gait training, Stair training, Balance training, Neuromuscular re-education, Strengthening, Endurance training, Therapeutic exercise, Therapeutic activity, Home exercise program, Postural re-education  PT Plan: Ongoing PT  PT Frequency: 5 times per week  PT Discharge Recommendations: Moderate intensity level of continued care  PT Recommended Transfer Status: Assist x2  PT - OK to Discharge: Yes      Subjective     General Visit Information:  General  Reason for Referral: GLF: 1. Left Hemo/Pneumothorax  2. L1 Compression fracture  3. Fracture of left ribs 5-7  Past Medical History Relevant to Rehab: 1. Recurrent UTIs with stage III Kidney disease  2. HTN  3. Glaucoma  4. BPH  Prior to Session Communication:  Bedside nurse  Patient Position Received: Bed, 3 rail up  General Comment: Pt alert and agreeable, on 2L NC, CT, tele, IV, ext cath. Pt expressing significant fear of falling throughout session.  Home Living:  Home Living  Type of Home: House  Lives With: Spouse (Wife has Alzheimer's, pt reports that his wife also needs assist at home)  Home Adaptive Equipment: Walker rolling or standard, Wheelchair-manual (lift chair)  Home Layout: Able to live on main level with bedroom/bathroom  Home Access: Stairs to enter with rails  Entrance Stairs-Number of Steps: 3-4  Bathroom Shower/Tub: Walk-in shower  Bathroom Equipment: Grab bars in shower, Grab bars around toilet, Built-in shower seat  Home Living Comments: Pt reports HHA present Mon-Fri for several hours/day. Has basement but pt reports he does not have to go into basement  Prior Level of Function:  Prior Function Per Pt/Caregiver Report  Level of [place]: Independent with ADLs and functional transfers  Receives Help From:  (HHA Mon-Fri)  ADL Assistance:  (Pt reports IND with dressing and toileting - requires assist from HHA for showering)  Homemaking Assistance:  (Meals on wheels, HHA assists with IADLs)  Ambulatory Assistance:  (Sleeps in lift chair, uses FWW for ambulation. + h/o falls)  Prior Function Comments: Pt reports a friend from [employer] drives him as needed to appointments  Precautions:  Precautions  Medical Precautions: Fall precautions, Spinal precautions, Chest tube    Vital Signs (Past 2hrs)        Date/Time Vitals Session Patient Position Pulse Resp SpO2 BP MAP (mmHg)    11/01/24 1144 Pre PT  --  84  17  98 %  115/54  --     11/01/24 1214 Post PT  --  73  15  98 %  118/63  --         Objective   Pain:  Pain Assessment  Pain Assessment: 0-10  0-10 (Numeric) Pain Score: 0 - No pain  Pain Interventions: Repositioned  Cognition:  Cognition  Overall Cognitive Status: Within Functional Limits  Orientation Level: Oriented X4    General  Assessments:  Activity Tolerance  Endurance: Tolerates 30 min exercise with multiple rests  Early Mobility/Exercise Safety Screen: Proceed with mobilization - No exclusion criteria met    Sensation  Sensation Comment: 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, no new sensory deficits    Coordination  Movements are Fluid and Coordinated: No    Postural Control  Postural Control: Impaired  Trunk Control: Excessively FWD head with kyphotic posture    Static Sitting Balance  Static Sitting-Balance Support: Feet supported (Intermittent UE support)  Static Sitting-Level of Assistance:  (Varied from CGA-Mod A 2/2 post trunk lean)  Dynamic Sitting Balance  Dynamic Sitting-Balance Support: Feet supported (Intermittent UE support)  Dynamic Sitting-Level of Assistance:  (Varied from CGA-Mod A 2/2 post trunk lean)    Static Standing Balance  Static Standing-Balance Support: Bilateral upper extremity supported  Static Standing-Level of Assistance: Maximum assistance (x2)  Static Standing-Comment/Number of Minutes: pt heavily retropulsive with fear of falling  Dynamic Standing Balance  Dynamic Standing-Comments: Unable  Functional Assessments:  Bed Mobility  Bed Mobility: Yes  Bed Mobility 1  Bed Mobility 1: Supine to sitting  Level of Assistance 1:  (Mod A to LEs + Max A to trunk)  Bed Mobility Comments 1: HOB elevated + draw sheet  Bed Mobility 2  Bed Mobility  2: Sitting to supine  Level of Assistance 2: Maximum assistance (x2)  Bed Mobility Comments 2: draw sheet    Transfers  Transfer: Yes  Transfer 1  Technique 1: Sit to stand, Stand to sit  Transfer Device 1:  (bilat arm in arm assist)  Transfer Level of Assistance 1: Maximum assistance (x2)  Trials/Comments 1: x3 attempts with 2 successful trials. Pt heavily retropulsive - cues for extending hips    Ambulation/Gait Training  Ambulation/Gait Training Performed: No    Extremity/Trunk Assessments:  RLE   RLE :  (ROM WFL, RLE grossly 4-/5 for hip flexion, knee ext, DF/PF, appeared to  have decreased glute/HS strength with difficulty extending with standing trials)  LLE   LLE :  (ROM WFL, RLE grossly 4-/5 for hip flexion, knee ext, DF/PF, appeared to have decreased glute/HS strength with difficulty extending with standing trials)  Treatments:  Therapeutic Exercise  Therapeutic Exercise Performed: Yes  Therapeutic Exercise Activity 1: Provided instruction on IS for HEP. Pt achieving 500-1000mL volumes. Also educated on continued LE AROM for HEP over weekend.     Outcome Measures:  [facility] Basic Mobility  Turning from your back to your side while in a flat bed without using bedrails: A lot  Moving from lying on your back to sitting on the side of a flat bed without using bedrails: Total  Moving to and from bed to chair (including a wheelchair): Total  Standing up from a chair using your arms (e.g. wheelchair or bedside chair): Total  To walk in hospital room: Total  Climbing 3-5 steps with railing: Total  Basic Mobility - Total Score: 7    FSS-ICU  Ambulation: Unable to attempt due to weakness  Rolling: Moderate assistance (performs 50 - 74% of task)  Sitting: Moderate assistance (performs 50 - 74% of task)  Transfer Sit-to-Stand: Total assistance (performs 25% or requires another person)  Transfer Supine-to-Sit: Total assistance (performs 25% or requires another person)  Total Score: 8      Early Mobility/Exercise Safety Screen: Proceed with mobilization - No exclusion criteria met  ICU Mobility Scale: Standing [4]    Encounter Problems       Encounter Problems (Active)       Balance       STG - Maintains dynamic standing balance with upper extremity support on LRAD with Min A x1        Start:  11/01/24    Expected End:  11/22/24            STG - Maintains dynamic sitting balance without upper extremity support with Sup       Start:  11/01/24    Expected End:  11/22/24               Mobility       STG - Patient will ambulate x20 ft with LRAD with Min A x1       Start:  11/01/24    Expected End:   11/22/24               PT Transfers       STG - Patient will perform bed mobility with Min A       Start:  11/01/24    Expected End:  11/22/24            STG - Patient will transfer sit to and from stand with Min A using LRAD       Start:  11/01/24    Expected End:  11/22/24                   Education Documentation  Body Mechanics, taught by Kaycee Reese PT at 11/1/2024  1:14 PM.  Learner: Patient  Readiness: Acceptance  Method: Explanation  Response: Verbalizes Understanding, Needs Reinforcement    Precautions, taught by Kaycee Reese PT at 11/1/2024  1:14 PM.  Learner: Patient  Readiness: Acceptance  Method: Explanation  Response: Verbalizes Understanding, Needs Reinforcement    Home Exercise Program, taught by Kaycee Reese PT at 11/1/2024  1:14 PM.  Learner: Patient  Readiness: Acceptance  Method: Explanation  Response: Verbalizes Understanding, Needs Reinforcement    Mobility Training, taught by Kaycee Reese PT at 11/1/2024  1:14 PM.  Learner: Patient  Readiness: Acceptance  Method: Explanation  Response: Verbalizes Understanding, Needs Reinforcement    Education Comments  No comments found.    Kaycee Reese PT, DPT

## 2024-11-01 NOTE — CARE PLAN
Problem: Skin  Goal: Decreased wound size/increased tissue granulation at next dressing change  11/1/2024 0327 by Jagruti Porter RN  Outcome: Progressing  Flowsheets (Taken 11/1/2024 0327)  Decreased wound size/increased tissue granulation at next dressing change: Promote sleep for wound healing  11/1/2024 0323 by Jagruti Porter RN  Outcome: Progressing  Goal: Participates in plan/prevention/treatment measures  11/1/2024 0327 by Jagruti Porter RN  Outcome: Progressing  Flowsheets (Taken 11/1/2024 0327)  Participates in plan/prevention/treatment measures: Elevate heels  11/1/2024 0323 by Jagruti Porter RN  Outcome: Progressing  Goal: Prevent/manage excess moisture  11/1/2024 0327 by Jagruti Porter RN  Outcome: Progressing  Flowsheets (Taken 11/1/2024 0327)  Prevent/manage excess moisture:   Moisturize dry skin   Monitor for/manage infection if present  11/1/2024 0323 by Jagruti Porter RN  Outcome: Progressing  Goal: Prevent/minimize sheer/friction injuries  11/1/2024 0327 by Jagruti Porter RN  Outcome: Progressing  Flowsheets (Taken 11/1/2024 0327)  Prevent/minimize sheer/friction injuries:   Use pull sheet   HOB 30 degrees or less   Turn/reposition every 2 hours/use positioning/transfer devices  11/1/2024 0323 by Jagruti Porter RN  Outcome: Progressing  Goal: Promote/optimize nutrition  11/1/2024 0327 by Jagruti Porter RN  Outcome: Progressing  Flowsheets (Taken 11/1/2024 0327)  Promote/optimize nutrition: Discuss with provider if NPO > 2 days  11/1/2024 0323 by Jagruti Porter RN  Outcome: Progressing  Goal: Promote skin healing  11/1/2024 0327 by Jagruti Porter RN  Outcome: Progressing  Flowsheets (Taken 11/1/2024 0327)  Promote skin healing: Turn/reposition every 2 hours/use positioning/transfer devices  11/1/2024 0323 by Jagruti Porter RN  Outcome: Progressing     Problem: Pain  Goal: Takes deep breaths with improved pain control throughout the shift  11/1/2024  0327 by Jagruti Porter RN  Outcome: Progressing  11/1/2024 0323 by Jagruti Porter RN  Outcome: Progressing  Goal: Turns in bed with improved pain control throughout the shift  11/1/2024 0327 by Jagruti Porter RN  Outcome: Progressing  11/1/2024 0323 by Jagruti Porter RN  Outcome: Progressing  Goal: Walks with improved pain control throughout the shift  11/1/2024 0327 by Jagruti Porter RN  Outcome: Progressing  11/1/2024 0323 by Jagruti Porter RN  Outcome: Progressing  Goal: Performs ADL's with improved pain control throughout shift  11/1/2024 0327 by Jagruti Porter RN  Outcome: Progressing  11/1/2024 0323 by Jagruti Porter RN  Outcome: Progressing  Goal: Participates in PT with improved pain control throughout the shift  11/1/2024 0327 by Jagruti Porter RN  Outcome: Progressing  11/1/2024 0323 by Jagruti Porter RN  Outcome: Progressing  Goal: Free from opioid side effects throughout the shift  11/1/2024 0327 by Jagruti Porter RN  Outcome: Progressing  11/1/2024 0323 by Jagruti Porter RN  Outcome: Progressing  Goal: Free from acute confusion related to pain meds throughout the shift  11/1/2024 0327 by Jagruti Porter RN  Outcome: Progressing  11/1/2024 0323 by Jagruti Porter RN  Outcome: Progressing   The patient's goals for the shift include  pain management     The clinical goals for the shift include  pt will remain safe and free from injury for duration of shift.    Over the shift, the patient did make progress toward the following goals.

## 2024-11-02 ENCOUNTER — APPOINTMENT (OUTPATIENT)
Dept: RADIOLOGY | Facility: HOSPITAL | Age: 89
DRG: 183 | End: 2024-11-02
Payer: MEDICARE

## 2024-11-02 ENCOUNTER — APPOINTMENT (OUTPATIENT)
Dept: CARDIOLOGY | Facility: HOSPITAL | Age: 89
End: 2024-11-02
Payer: MEDICARE

## 2024-11-02 LAB
ANION GAP SERPL CALC-SCNC: 11 MMOL/L (ref 10–20)
AORTIC VALVE PEAK VELOCITY: 1.29 M/S
AV PEAK GRADIENT: 7 MMHG
AVA (PEAK VEL): 2.4 CM2
BACTERIA UR CULT: ABNORMAL
BUN SERPL-MCNC: 37 MG/DL (ref 6–23)
CALCIUM SERPL-MCNC: 8 MG/DL (ref 8.6–10.6)
CHLORIDE SERPL-SCNC: 109 MMOL/L (ref 98–107)
CO2 SERPL-SCNC: 24 MMOL/L (ref 21–32)
CREAT SERPL-MCNC: 1.11 MG/DL (ref 0.5–1.3)
EGFRCR SERPLBLD CKD-EPI 2021: 64 ML/MIN/1.73M*2
EJECTION FRACTION APICAL 4 CHAMBER: 60
EJECTION FRACTION: 51 %
ERYTHROCYTE [DISTWIDTH] IN BLOOD BY AUTOMATED COUNT: 14.7 % (ref 11.5–14.5)
GLUCOSE BLD MANUAL STRIP-MCNC: 125 MG/DL (ref 74–99)
GLUCOSE BLD MANUAL STRIP-MCNC: 85 MG/DL (ref 74–99)
GLUCOSE BLD MANUAL STRIP-MCNC: 97 MG/DL (ref 74–99)
GLUCOSE SERPL-MCNC: 131 MG/DL (ref 74–99)
HCT VFR BLD AUTO: 29 % (ref 41–52)
HGB BLD-MCNC: 9 G/DL (ref 13.5–17.5)
LEFT ATRIUM VOLUME AREA LENGTH INDEX BSA: 41.3 ML/M2
LEFT VENTRICLE INTERNAL DIMENSION DIASTOLE: 4.8 CM (ref 3.5–6)
LEFT VENTRICULAR OUTFLOW TRACT DIAMETER: 1.9 CM
MCH RBC QN AUTO: 28.8 PG (ref 26–34)
MCHC RBC AUTO-ENTMCNC: 31 G/DL (ref 32–36)
MCV RBC AUTO: 93 FL (ref 80–100)
MITRAL VALVE E/A RATIO: 0.64
NRBC BLD-RTO: 0 /100 WBCS (ref 0–0)
PLATELET # BLD AUTO: 234 X10*3/UL (ref 150–450)
POTASSIUM SERPL-SCNC: 3.9 MMOL/L (ref 3.5–5.3)
RBC # BLD AUTO: 3.13 X10*6/UL (ref 4.5–5.9)
RIGHT VENTRICLE FREE WALL PEAK S': 11.5 CM/S
RIGHT VENTRICLE PEAK SYSTOLIC PRESSURE: 21.5 MMHG
SODIUM SERPL-SCNC: 140 MMOL/L (ref 136–145)
TRICUSPID ANNULAR PLANE SYSTOLIC EXCURSION: 2.5 CM
WBC # BLD AUTO: 7.6 X10*3/UL (ref 4.4–11.3)

## 2024-11-02 PROCEDURE — 93306 TTE W/DOPPLER COMPLETE: CPT | Performed by: INTERNAL MEDICINE

## 2024-11-02 PROCEDURE — 36415 COLL VENOUS BLD VENIPUNCTURE: CPT

## 2024-11-02 PROCEDURE — 2500000004 HC RX 250 GENERAL PHARMACY W/ HCPCS (ALT 636 FOR OP/ED): Performed by: STUDENT IN AN ORGANIZED HEALTH CARE EDUCATION/TRAINING PROGRAM

## 2024-11-02 PROCEDURE — 71045 X-RAY EXAM CHEST 1 VIEW: CPT

## 2024-11-02 PROCEDURE — 80048 BASIC METABOLIC PNL TOTAL CA: CPT

## 2024-11-02 PROCEDURE — 2500000004 HC RX 250 GENERAL PHARMACY W/ HCPCS (ALT 636 FOR OP/ED)

## 2024-11-02 PROCEDURE — 99231 SBSQ HOSP IP/OBS SF/LOW 25: CPT

## 2024-11-02 PROCEDURE — 2500000005 HC RX 250 GENERAL PHARMACY W/O HCPCS: Performed by: STUDENT IN AN ORGANIZED HEALTH CARE EDUCATION/TRAINING PROGRAM

## 2024-11-02 PROCEDURE — 1100000001 HC PRIVATE ROOM DAILY

## 2024-11-02 PROCEDURE — 71045 X-RAY EXAM CHEST 1 VIEW: CPT | Performed by: RADIOLOGY

## 2024-11-02 PROCEDURE — 99231 SBSQ HOSP IP/OBS SF/LOW 25: CPT | Performed by: SURGERY

## 2024-11-02 PROCEDURE — 82947 ASSAY GLUCOSE BLOOD QUANT: CPT

## 2024-11-02 PROCEDURE — 2500000001 HC RX 250 WO HCPCS SELF ADMINISTERED DRUGS (ALT 637 FOR MEDICARE OP)

## 2024-11-02 PROCEDURE — 85027 COMPLETE CBC AUTOMATED: CPT

## 2024-11-02 PROCEDURE — 2500000001 HC RX 250 WO HCPCS SELF ADMINISTERED DRUGS (ALT 637 FOR MEDICARE OP): Performed by: STUDENT IN AN ORGANIZED HEALTH CARE EDUCATION/TRAINING PROGRAM

## 2024-11-02 PROCEDURE — 93306 TTE W/DOPPLER COMPLETE: CPT

## 2024-11-02 PROCEDURE — 2500000002 HC RX 250 W HCPCS SELF ADMINISTERED DRUGS (ALT 637 FOR MEDICARE OP, ALT 636 FOR OP/ED): Performed by: STUDENT IN AN ORGANIZED HEALTH CARE EDUCATION/TRAINING PROGRAM

## 2024-11-02 RX ORDER — FUROSEMIDE 10 MG/ML
40 INJECTION INTRAMUSCULAR; INTRAVENOUS ONCE
Status: COMPLETED | OUTPATIENT
Start: 2024-11-02 | End: 2024-11-02

## 2024-11-02 RX ORDER — METHOCARBAMOL 500 MG/1
500 TABLET, FILM COATED ORAL EVERY 8 HOURS PRN
Status: DISPENSED | OUTPATIENT
Start: 2024-11-02

## 2024-11-02 RX ORDER — OXYCODONE HYDROCHLORIDE 5 MG/1
2.5 TABLET ORAL EVERY 4 HOURS PRN
Status: ACTIVE | OUTPATIENT
Start: 2024-11-02

## 2024-11-02 RX ORDER — OXYCODONE HYDROCHLORIDE 5 MG/1
5 TABLET ORAL EVERY 4 HOURS PRN
Status: DISPENSED | OUTPATIENT
Start: 2024-11-02

## 2024-11-02 RX ADMIN — METOPROLOL TARTRATE 12.5 MG: 25 TABLET, FILM COATED ORAL at 08:00

## 2024-11-02 RX ADMIN — OXYCODONE HYDROCHLORIDE 5 MG: 5 TABLET ORAL at 08:00

## 2024-11-02 RX ADMIN — FUROSEMIDE 40 MG: 10 INJECTION, SOLUTION INTRAMUSCULAR; INTRAVENOUS at 12:14

## 2024-11-02 RX ADMIN — LEVOFLOXACIN 250 MG: 250 INJECTION, SOLUTION INTRAVENOUS at 12:15

## 2024-11-02 RX ADMIN — ENOXAPARIN SODIUM 30 MG: 30 INJECTION SUBCUTANEOUS at 21:35

## 2024-11-02 RX ADMIN — METHOCARBAMOL 500 MG: 500 TABLET ORAL at 08:00

## 2024-11-02 RX ADMIN — ACETAMINOPHEN 650 MG: 325 TABLET, FILM COATED ORAL at 21:35

## 2024-11-02 RX ADMIN — ENOXAPARIN SODIUM 30 MG: 30 INJECTION SUBCUTANEOUS at 08:00

## 2024-11-02 RX ADMIN — LIDOCAINE 1 PATCH: 4 PATCH TOPICAL at 08:00

## 2024-11-02 RX ADMIN — HYDROMORPHONE HYDROCHLORIDE 0.2 MG: 0.2 INJECTION, SOLUTION INTRAMUSCULAR; INTRAVENOUS; SUBCUTANEOUS at 18:30

## 2024-11-02 RX ADMIN — TAMSULOSIN HYDROCHLORIDE 0.4 MG: 0.4 CAPSULE ORAL at 08:00

## 2024-11-02 RX ADMIN — METOPROLOL TARTRATE 12.5 MG: 25 TABLET, FILM COATED ORAL at 21:36

## 2024-11-02 RX ADMIN — OXYCODONE HYDROCHLORIDE 5 MG: 5 TABLET ORAL at 16:20

## 2024-11-02 RX ADMIN — ACETAMINOPHEN 650 MG: 325 TABLET, FILM COATED ORAL at 04:22

## 2024-11-02 RX ADMIN — METHOCARBAMOL 500 MG: 500 TABLET ORAL at 06:22

## 2024-11-02 RX ADMIN — OXYCODONE HYDROCHLORIDE 5 MG: 5 TABLET ORAL at 12:20

## 2024-11-02 RX ADMIN — PERFLUTREN 1 ML OF DILUTION: 6.52 INJECTION, SUSPENSION INTRAVENOUS at 08:56

## 2024-11-02 RX ADMIN — ACETAMINOPHEN 650 MG: 325 TABLET, FILM COATED ORAL at 16:20

## 2024-11-02 RX ADMIN — ACETAMINOPHEN 650 MG: 325 TABLET, FILM COATED ORAL at 10:00

## 2024-11-02 RX ADMIN — METHOCARBAMOL 500 MG: 500 TABLET ORAL at 16:20

## 2024-11-02 ASSESSMENT — COGNITIVE AND FUNCTIONAL STATUS - GENERAL
DAILY ACTIVITIY SCORE: 17
HELP NEEDED FOR BATHING: A LITTLE
DRESSING REGULAR LOWER BODY CLOTHING: A LITTLE
DRESSING REGULAR UPPER BODY CLOTHING: A LITTLE
EATING MEALS: A LITTLE
WALKING IN HOSPITAL ROOM: A LOT
MOVING FROM LYING ON BACK TO SITTING ON SIDE OF FLAT BED WITH BEDRAILS: A LITTLE
MOBILITY SCORE: 16
TURNING FROM BACK TO SIDE WHILE IN FLAT BAD: A LITTLE
CLIMB 3 TO 5 STEPS WITH RAILING: A LOT
MOVING TO AND FROM BED TO CHAIR: A LITTLE
TOILETING: A LOT
PERSONAL GROOMING: A LITTLE
STANDING UP FROM CHAIR USING ARMS: A LITTLE

## 2024-11-02 ASSESSMENT — PAIN - FUNCTIONAL ASSESSMENT
PAIN_FUNCTIONAL_ASSESSMENT: WONG-BAKER FACES
PAIN_FUNCTIONAL_ASSESSMENT: 0-10
PAIN_FUNCTIONAL_ASSESSMENT: 0-10
PAIN_FUNCTIONAL_ASSESSMENT: WONG-BAKER FACES
PAIN_FUNCTIONAL_ASSESSMENT: 0-10
PAIN_FUNCTIONAL_ASSESSMENT: WONG-BAKER FACES

## 2024-11-02 ASSESSMENT — PAIN SCALES - GENERAL
PAINLEVEL_OUTOF10: 7
PAINLEVEL_OUTOF10: 0 - NO PAIN
PAINLEVEL_OUTOF10: 7

## 2024-11-02 ASSESSMENT — PAIN SCALES - WONG BAKER
WONGBAKER_NUMERICALRESPONSE: NO HURT

## 2024-11-02 ASSESSMENT — PAIN DESCRIPTION - LOCATION: LOCATION: CHEST

## 2024-11-02 NOTE — CARE PLAN
Problem: Skin  Goal: Prevent/manage excess moisture  Outcome: Progressing  Goal: Prevent/minimize sheer/friction injuries  Outcome: Progressing

## 2024-11-02 NOTE — PROGRESS NOTES
88 yo male fall with rib fractures, left hemopneumothorax with pigtail catheter placement, L1 compression fracture.   Doing well. Pain is controlled. Tolerating some PO. IS 1L.   UC with Proteus  Hgb 7.   CXR with question of left pleural effusion and pulmonary edema.   On exam, chronically ill appearing. Abd is soft, NT, ND. No respiratory distress. Chest tube with serous output.   Plan for:   -Rib fractures: Pain control and pulmonary toilet. PT.   -Frailty: Geriatrics evaluation appreciated.   -Pulmonary edema, question of left pleural effusion, left chest pigtail catheter: Maintain catheter to water seal. Trial IV Lasix and then restart home PO. Check CXR tomorrow; if effusion still present, then obtain CT chest to determine if retained hemothorax.   -UTI: Continue ABX based on sensitivities for 7 days.   -Lovenox.   -Discuss code status  -Home meds

## 2024-11-02 NOTE — PROGRESS NOTES
This patient presented to [facility] ED as a trauma consult. Patient is a transfer from [facility] after a fall. Patient reportedly has had multiple falls over the last few days. Prior to transfer to [facility], patient was placed on 2L NC. He received imaging at OSH, that revealed multiple rib fx that prompted transfer to [facility]. Patient also discovered to have a L1 compressions fx. Patient had a L pigtail placed in the ED and admitted to [facility] with trauma service.     Patient was evaluated by PT/OT and recommended moderate intensity. SW to speak with patient and/or family about SNF recommendations and offer choices if agreeable. Patient is not medically ready for discharge at this time. SW will continue to follow to facilitate discharge  plan.     Update @ 13:07: SW spoke with patient son Basilio [phone], to discuss discharge planning. Basilio agrees that patient could benefit from SNF placement, however, they may go in a different direction Basilio explains that his mother, patient's wife has dementia and she doesn't do well without the patient. Basilio is currently visiting AL facilities and the plan at this time is for them both to go and hopefull patient will be able to get some PT/OT there.       JOHN Roldan

## 2024-11-02 NOTE — HOSPITAL COURSE
89M with history of HTN, BPH, and chronic UTISs transferred from OSH on 10/31 for further evaluation after 2 falls at home (presumed syncopal) resulting a left hemopneumothorax, fracture of let 5-7th ribs, and a L1 compression fx.    A left sided pigtail chest tube placed without complication.  Orthopedic spine team consulted, L1 fx deemed chronic.  No spinal precautions or operative interventions indicated at this time.    Was admitted to the TSICU for monitoring.  Not a candidate for a pain block by anesthesia due to location of fractures.   Chest tube placed to water seal 11/1 and transferred from ICU to [facility].  An echocardiogram obtained given falls, showed EF of 51%, abnormal septal motion consistent with intraventricular conduction delay.  Carotid duplex without significant stenosis.  11/2 noted to have some pulmonary edema, diuresed with lasix.  Chest tube inadvertently found to be in abdomen, replaced overnight 11/3 and appropriate placement of new pigtail confirmed 11/4. PT/OT rec mod intensity. Tube eventually removed with stable hemithorax thereafter. Deemed SNF per pt/ot. Weaned to minimal oxygen 1L, chest pain controlled. During stay, finished appropriate abx course for UTI. Resp status stable back on home diuretic, should continue self bronchopulmonary hygiene at facility. Short course oxygen not unreasonable in elderly male with multiple rib fx's with effusion. Trauma fu as needed. Should see home PCP post SNF.

## 2024-11-02 NOTE — PROGRESS NOTES
Postop Pain HPI -   Palliative: relieved with IV analgesics and regional local anesthetics  Provocative: movement  Quality:  burning and aching  Radiation:  none  Severity:  6/10  Timing: constant    24-HOUR OPIOID CONSUMPTION:  Oxycodone 25 mg    Scheduled medications  acetaminophen, 650 mg, oral, q6h  enoxaparin, 30 mg, subcutaneous, BID  insulin lispro, 0-5 Units, subcutaneous, TID  levoFLOXacin, 250 mg, intravenous, q24h  lidocaine, 1 patch, transdermal, Daily  metoprolol tartrate, 12.5 mg, oral, BID  polyethylene glycol, 17 g, oral, Daily  tamsulosin, 0.4 mg, oral, Daily      Continuous medications     PRN medications  PRN medications: HYDROmorphone, methocarbamol, oxyCODONE, oxyCODONE, oxygen     Physical Exam:  Constitutional:  no distress, alert and cooperative  Eyes: clear sclera  Head/Neck: No apparent injury, trachea midline  Respiratory/Thorax: Patent airways, thorax symmetric, breathing comfortably  Cardiovascular: no pitting edema  Gastrointestinal: Nondistended  Musculoskeletal: ROM intact  Extremities: no clubbing  Neurological: alert, escalante x4  Psychological: Appropriate affect    Results for orders placed or performed during the hospital encounter of 10/31/24 (from the past 24 hours)   POCT GLUCOSE   Result Value Ref Range    POCT Glucose 112 (H) 74 - 99 mg/dL   POCT GLUCOSE   Result Value Ref Range    POCT Glucose 116 (H) 74 - 99 mg/dL   POCT GLUCOSE   Result Value Ref Range    POCT Glucose 97 74 - 99 mg/dL   Transthoracic Echo (TTE) Complete   Result Value Ref Range    AV pk samantha 1.29 m/s    LVOT diam 1.90 cm    MV E/A ratio 0.64     LA vol index A/L 41.3 ml/m2    Tricuspid annular plane systolic excursion 2.5 cm    LV EF 51 %    RV free wall pk S' 11.50 cm/s    LVIDd 4.80 cm    RVSP 21.5 mmHg    Aortic Valve Area by Continuity of Peak Velocity 2.40 cm2    AV pk grad 7 mmHg    LV A4C EF 60.0    POCT GLUCOSE   Result Value Ref Range    POCT Glucose 85 74 - 99 mg/dL       Tommy Richmond is a 89 y.o.  year old male patient who presents for multiple rib fx after a fall and L chest wall hematoma with Teodoro Wilkerson MD on 10/31/2024. Acute Pain consulted for assistance with pain control.      Plan:  - Patient reports adequate pain control with current regimen   - Consider below options if pain worsens:  - IV Toradol 30mg Q6H for 6 doses per surgical service  - IV Mg 2g Q8H x 3 doses  - IV Robaxin 1000mg QID  - IV dilaudid 0.2mg Q4H for breakthrough pain  - IV Tylenol 1000mg Q6H x 4 doses  - Ketamine 5 mg/hr for 24hrs   - Lidocaine 50 mg/hr, check lidocaine level after 6 hrs  - Precedex 0.1 mcg/kg/min for 24 hrs  Please draw a lidocaine level 6 hours after starting the infusion, from a different IV than the line that the lidocaine is infusing into. After that first level, you will have to draw lidocaine levels q24h.   - Continuous pulse ox  - Acute pain service will sign off at this time     Estuardo Sanchez, PGY1     Acute Pain Resident  pg [phone] ph [phone]

## 2024-11-03 ENCOUNTER — APPOINTMENT (OUTPATIENT)
Dept: RADIOLOGY | Facility: HOSPITAL | Age: 89
End: 2024-11-03
Payer: MEDICARE

## 2024-11-03 ENCOUNTER — APPOINTMENT (OUTPATIENT)
Dept: RADIOLOGY | Facility: HOSPITAL | Age: 89
DRG: 183 | End: 2024-11-03
Payer: MEDICARE

## 2024-11-03 VITALS
OXYGEN SATURATION: 97 % | WEIGHT: 145 LBS | HEART RATE: 78 BPM | BODY MASS INDEX: 23.4 KG/M2 | DIASTOLIC BLOOD PRESSURE: 97 MMHG | RESPIRATION RATE: 16 BRPM | TEMPERATURE: 98 F | SYSTOLIC BLOOD PRESSURE: 199 MMHG

## 2024-11-03 LAB
ALBUMIN SERPL BCP-MCNC: 2.4 G/DL (ref 3.4–5)
ANION GAP SERPL CALC-SCNC: 10 MMOL/L (ref 10–20)
BACTERIA BLD CULT: NORMAL
BACTERIA BLD CULT: NORMAL
BUN SERPL-MCNC: 43 MG/DL (ref 6–23)
CALCIUM SERPL-MCNC: 7.8 MG/DL (ref 8.6–10.6)
CHLORIDE SERPL-SCNC: 106 MMOL/L (ref 98–107)
CO2 SERPL-SCNC: 28 MMOL/L (ref 21–32)
CREAT SERPL-MCNC: 1.67 MG/DL (ref 0.5–1.3)
EGFRCR SERPLBLD CKD-EPI 2021: 39 ML/MIN/1.73M*2
ERYTHROCYTE [DISTWIDTH] IN BLOOD BY AUTOMATED COUNT: 14.6 % (ref 11.5–14.5)
GLUCOSE SERPL-MCNC: 101 MG/DL (ref 74–99)
HCT VFR BLD AUTO: 26.5 % (ref 41–52)
HGB BLD-MCNC: 8.4 G/DL (ref 13.5–17.5)
MAGNESIUM SERPL-MCNC: 1.9 MG/DL (ref 1.6–2.4)
MCH RBC QN AUTO: 28.8 PG (ref 26–34)
MCHC RBC AUTO-ENTMCNC: 31.7 G/DL (ref 32–36)
MCV RBC AUTO: 91 FL (ref 80–100)
NRBC BLD-RTO: 0.3 /100 WBCS (ref 0–0)
PHOSPHATE SERPL-MCNC: 3.5 MG/DL (ref 2.5–4.9)
PLATELET # BLD AUTO: 237 X10*3/UL (ref 150–450)
POTASSIUM SERPL-SCNC: 4.1 MMOL/L (ref 3.5–5.3)
RBC # BLD AUTO: 2.92 X10*6/UL (ref 4.5–5.9)
SODIUM SERPL-SCNC: 140 MMOL/L (ref 136–145)
WBC # BLD AUTO: 7.2 X10*3/UL (ref 4.4–11.3)

## 2024-11-03 PROCEDURE — 71250 CT THORAX DX C-: CPT | Performed by: RADIOLOGY

## 2024-11-03 PROCEDURE — 2500000002 HC RX 250 W HCPCS SELF ADMINISTERED DRUGS (ALT 637 FOR MEDICARE OP, ALT 636 FOR OP/ED): Performed by: STUDENT IN AN ORGANIZED HEALTH CARE EDUCATION/TRAINING PROGRAM

## 2024-11-03 PROCEDURE — 71045 X-RAY EXAM CHEST 1 VIEW: CPT

## 2024-11-03 PROCEDURE — 36415 COLL VENOUS BLD VENIPUNCTURE: CPT

## 2024-11-03 PROCEDURE — 2500000004 HC RX 250 GENERAL PHARMACY W/ HCPCS (ALT 636 FOR OP/ED): Performed by: STUDENT IN AN ORGANIZED HEALTH CARE EDUCATION/TRAINING PROGRAM

## 2024-11-03 PROCEDURE — 2500000001 HC RX 250 WO HCPCS SELF ADMINISTERED DRUGS (ALT 637 FOR MEDICARE OP)

## 2024-11-03 PROCEDURE — 0W9B30Z DRAINAGE OF LEFT PLEURAL CAVITY WITH DRAINAGE DEVICE, PERCUTANEOUS APPROACH: ICD-10-PCS | Performed by: STUDENT IN AN ORGANIZED HEALTH CARE EDUCATION/TRAINING PROGRAM

## 2024-11-03 PROCEDURE — 2500000004 HC RX 250 GENERAL PHARMACY W/ HCPCS (ALT 636 FOR OP/ED)

## 2024-11-03 PROCEDURE — 71045 X-RAY EXAM CHEST 1 VIEW: CPT | Performed by: RADIOLOGY

## 2024-11-03 PROCEDURE — 80069 RENAL FUNCTION PANEL: CPT

## 2024-11-03 PROCEDURE — 1100000001 HC PRIVATE ROOM DAILY

## 2024-11-03 PROCEDURE — 2500000005 HC RX 250 GENERAL PHARMACY W/O HCPCS: Performed by: STUDENT IN AN ORGANIZED HEALTH CARE EDUCATION/TRAINING PROGRAM

## 2024-11-03 PROCEDURE — 71250 CT THORAX DX C-: CPT

## 2024-11-03 PROCEDURE — 85027 COMPLETE CBC AUTOMATED: CPT

## 2024-11-03 PROCEDURE — 99231 SBSQ HOSP IP/OBS SF/LOW 25: CPT | Performed by: SURGERY

## 2024-11-03 PROCEDURE — 2500000005 HC RX 250 GENERAL PHARMACY W/O HCPCS: Performed by: NURSE PRACTITIONER

## 2024-11-03 PROCEDURE — 2500000001 HC RX 250 WO HCPCS SELF ADMINISTERED DRUGS (ALT 637 FOR MEDICARE OP): Performed by: STUDENT IN AN ORGANIZED HEALTH CARE EDUCATION/TRAINING PROGRAM

## 2024-11-03 PROCEDURE — 83735 ASSAY OF MAGNESIUM: CPT

## 2024-11-03 RX ORDER — TALC
6 POWDER (GRAM) TOPICAL NIGHTLY
Status: DISPENSED | OUTPATIENT
Start: 2024-11-03

## 2024-11-03 RX ORDER — CEFIXIME 400 MG/1
400 CAPSULE ORAL DAILY
Status: DISCONTINUED | OUTPATIENT
Start: 2024-11-03 | End: 2024-11-03

## 2024-11-03 RX ORDER — CEFTRIAXONE 1 G/50ML
1 INJECTION, SOLUTION INTRAVENOUS EVERY 24 HOURS
Status: DISPENSED | OUTPATIENT
Start: 2024-11-03 | End: 2024-11-13

## 2024-11-03 RX ORDER — BISACODYL 10 MG/1
10 SUPPOSITORY RECTAL DAILY
Status: DISPENSED | OUTPATIENT
Start: 2024-11-03

## 2024-11-03 RX ORDER — FUROSEMIDE 20 MG/1
20 TABLET ORAL DAILY
Status: ACTIVE | OUTPATIENT
Start: 2024-11-03

## 2024-11-03 RX ORDER — CEFIXIME 200 MG/5ML
300 POWDER, FOR SUSPENSION ORAL
Status: DISCONTINUED | OUTPATIENT
Start: 2024-11-03 | End: 2024-11-03

## 2024-11-03 RX ADMIN — LIDOCAINE 1 PATCH: 4 PATCH TOPICAL at 09:19

## 2024-11-03 RX ADMIN — LEVOFLOXACIN 250 MG: 250 INJECTION, SOLUTION INTRAVENOUS at 09:18

## 2024-11-03 RX ADMIN — ACETAMINOPHEN 650 MG: 325 TABLET, FILM COATED ORAL at 09:19

## 2024-11-03 RX ADMIN — POLYETHYLENE GLYCOL 3350 17 G: 17 POWDER, FOR SOLUTION ORAL at 09:19

## 2024-11-03 RX ADMIN — ACETAMINOPHEN 650 MG: 325 TABLET, FILM COATED ORAL at 04:06

## 2024-11-03 RX ADMIN — ENOXAPARIN SODIUM 30 MG: 30 INJECTION SUBCUTANEOUS at 09:19

## 2024-11-03 RX ADMIN — CEFTRIAXONE SODIUM 1 G: 1 INJECTION, SOLUTION INTRAVENOUS at 23:41

## 2024-11-03 RX ADMIN — METOPROLOL TARTRATE 12.5 MG: 25 TABLET, FILM COATED ORAL at 09:19

## 2024-11-03 RX ADMIN — ENOXAPARIN SODIUM 30 MG: 30 INJECTION SUBCUTANEOUS at 22:31

## 2024-11-03 RX ADMIN — Medication 6 MG: at 22:31

## 2024-11-03 RX ADMIN — OXYCODONE HYDROCHLORIDE 5 MG: 5 TABLET ORAL at 18:06

## 2024-11-03 RX ADMIN — OXYCODONE HYDROCHLORIDE 5 MG: 5 TABLET ORAL at 09:30

## 2024-11-03 RX ADMIN — ACETAMINOPHEN 650 MG: 325 TABLET, FILM COATED ORAL at 22:31

## 2024-11-03 RX ADMIN — BISACODYL 10 MG: 10 SUPPOSITORY RECTAL at 10:47

## 2024-11-03 RX ADMIN — METOPROLOL TARTRATE 12.5 MG: 25 TABLET, FILM COATED ORAL at 22:31

## 2024-11-03 RX ADMIN — TAMSULOSIN HYDROCHLORIDE 0.4 MG: 0.4 CAPSULE ORAL at 09:19

## 2024-11-03 RX ADMIN — OXYCODONE HYDROCHLORIDE 5 MG: 5 TABLET ORAL at 14:06

## 2024-11-03 RX ADMIN — HYDROMORPHONE HYDROCHLORIDE 0.2 MG: 0.2 INJECTION, SOLUTION INTRAMUSCULAR; INTRAVENOUS; SUBCUTANEOUS at 12:10

## 2024-11-03 RX ADMIN — ACETAMINOPHEN 650 MG: 325 TABLET, FILM COATED ORAL at 14:05

## 2024-11-03 RX ADMIN — Medication 6 MG: at 01:04

## 2024-11-03 ASSESSMENT — COGNITIVE AND FUNCTIONAL STATUS - GENERAL
TOILETING: A LOT
DRESSING REGULAR LOWER BODY CLOTHING: A LITTLE
MOVING TO AND FROM BED TO CHAIR: A LITTLE
HELP NEEDED FOR BATHING: A LITTLE
DAILY ACTIVITIY SCORE: 17
MOBILITY SCORE: 16
TURNING FROM BACK TO SIDE WHILE IN FLAT BAD: A LITTLE
CLIMB 3 TO 5 STEPS WITH RAILING: A LOT
EATING MEALS: A LITTLE
DRESSING REGULAR UPPER BODY CLOTHING: A LITTLE
STANDING UP FROM CHAIR USING ARMS: A LITTLE
PERSONAL GROOMING: A LITTLE
WALKING IN HOSPITAL ROOM: A LOT
MOVING FROM LYING ON BACK TO SITTING ON SIDE OF FLAT BED WITH BEDRAILS: A LITTLE

## 2024-11-03 ASSESSMENT — PAIN - FUNCTIONAL ASSESSMENT
PAIN_FUNCTIONAL_ASSESSMENT: 0-10

## 2024-11-03 ASSESSMENT — PAIN SCALES - GENERAL
PAINLEVEL_OUTOF10: 0 - NO PAIN
PAINLEVEL_OUTOF10: 5 - MODERATE PAIN
PAINLEVEL_OUTOF10: 8
PAINLEVEL_OUTOF10: 5 - MODERATE PAIN
PAINLEVEL_OUTOF10: 10 - WORST POSSIBLE PAIN
PAINLEVEL_OUTOF10: 7
PAINLEVEL_OUTOF10: 7

## 2024-11-03 ASSESSMENT — PAIN DESCRIPTION - ORIENTATION: ORIENTATION: LEFT

## 2024-11-03 ASSESSMENT — PAIN SCALES - WONG BAKER: WONGBAKER_NUMERICALRESPONSE: HURTS LITTLE BIT

## 2024-11-03 NOTE — CARE PLAN
The patient's goals for the shift include      The clinical goals for the shift include remaining free of falls or injury

## 2024-11-03 NOTE — PROGRESS NOTES
88 yo male fall with rib fractures, left hemopneumothorax with pigtail catheter placement, L1 compression fracture.   Doing well. Looks much better today.  Pain is controlled. Tolerating some PO. IS 1L.   Hgb 8  CXR with improved pulmonary edema but question of persistent left effusion.   On exam, chronically ill appearing. Abd is soft, NT, ND. No respiratory distress. Chest tube with serous output. No air leak.   Cr increased to 1.6.   Plan for:   -Rib fractures: Pain control and pulmonary toilet. PT.   -Frailty: Geriatrics evaluation appreciated.   -Pulmonary edema, question of left pleural effusion, left chest pigtail catheter: Maintain catheter to water seal. Hold on further Lasix given increase in creatinine. Obtain CT chest without contrast to determine if retrained hemothorax present.   -UTI: Continue ABX based on sensitivities for 7 days.   -Lovenox; change to heparin subcutaneous  -Discuss code status  -Home meds   31yo F  pt seen w dr Rivas   evaluated: abd exam/pelvic exam: benign; no acute abdomen  -   LMP: 9/10/23  s/p mtxt treatment 10/25/2023  day 1 bhcg 1853  day 4 bhcg 1507    offered to give the 2nd dose today to patient and return to the hospital day 7 and day 11  - pt reports she has plans on day 11   so due to exam is benign and hemodynamically stable and sonogram stable  - discussed to return to the hospital on day 7 for bhcg trend; if noted 15 % decrease noted then will do rpt bhcg weekly until it is negative  however; if no 15% decrease noted: then a repeat mtxt dose is possible or surgical intervention if there is changes with patient's clinical status    -pt verbalized understanding and verbalized that she will return to ER on day 7 for evaluation     -ER team notified of disposition: discharge home today     precaution of returning to ER if abd pain worsen; feeling faint; heavy vaginal bleeding

## 2024-11-03 NOTE — CARE PLAN
The patient's goals for the shift include      The clinical goals for the shift include patient chest tube will remain intact to waterseal    Over the shift, the patient did make progress toward the following goals. Barriers to progression include none noted. Recommendations to address these barriers include continue current therapy.

## 2024-11-04 ENCOUNTER — APPOINTMENT (OUTPATIENT)
Dept: RADIOLOGY | Facility: HOSPITAL | Age: 89
End: 2024-11-04
Payer: MEDICARE

## 2024-11-04 LAB
ALBUMIN SERPL BCP-MCNC: 2.3 G/DL (ref 3.4–5)
ANION GAP SERPL CALC-SCNC: 11 MMOL/L (ref 10–20)
BACTERIA BLD CULT: NORMAL
BACTERIA BLD CULT: NORMAL
BUN SERPL-MCNC: 33 MG/DL (ref 6–23)
CALCIUM SERPL-MCNC: 7.5 MG/DL (ref 8.6–10.6)
CHLORIDE SERPL-SCNC: 109 MMOL/L (ref 98–107)
CO2 SERPL-SCNC: 25 MMOL/L (ref 21–32)
CREAT SERPL-MCNC: 1.05 MG/DL (ref 0.5–1.3)
EGFRCR SERPLBLD CKD-EPI 2021: 69 ML/MIN/1.73M*2
ERYTHROCYTE [DISTWIDTH] IN BLOOD BY AUTOMATED COUNT: 14.8 % (ref 11.5–14.5)
GLUCOSE BLD MANUAL STRIP-MCNC: 101 MG/DL (ref 74–99)
GLUCOSE BLD MANUAL STRIP-MCNC: 106 MG/DL (ref 74–99)
GLUCOSE BLD MANUAL STRIP-MCNC: 73 MG/DL (ref 74–99)
GLUCOSE BLD MANUAL STRIP-MCNC: 84 MG/DL (ref 74–99)
GLUCOSE SERPL-MCNC: 85 MG/DL (ref 74–99)
HCT VFR BLD AUTO: 26.9 % (ref 41–52)
HGB BLD-MCNC: 8.5 G/DL (ref 13.5–17.5)
MAGNESIUM SERPL-MCNC: 1.91 MG/DL (ref 1.6–2.4)
MCH RBC QN AUTO: 28.5 PG (ref 26–34)
MCHC RBC AUTO-ENTMCNC: 31.6 G/DL (ref 32–36)
MCV RBC AUTO: 90 FL (ref 80–100)
NRBC BLD-RTO: 0 /100 WBCS (ref 0–0)
PHOSPHATE SERPL-MCNC: 3.1 MG/DL (ref 2.5–4.9)
PLATELET # BLD AUTO: 230 X10*3/UL (ref 150–450)
POTASSIUM SERPL-SCNC: 3.9 MMOL/L (ref 3.5–5.3)
RBC # BLD AUTO: 2.98 X10*6/UL (ref 4.5–5.9)
SODIUM SERPL-SCNC: 141 MMOL/L (ref 136–145)
WBC # BLD AUTO: 6.9 X10*3/UL (ref 4.4–11.3)

## 2024-11-04 PROCEDURE — 71045 X-RAY EXAM CHEST 1 VIEW: CPT

## 2024-11-04 PROCEDURE — 84520 ASSAY OF UREA NITROGEN: CPT

## 2024-11-04 PROCEDURE — 2500000005 HC RX 250 GENERAL PHARMACY W/O HCPCS: Performed by: NURSE PRACTITIONER

## 2024-11-04 PROCEDURE — 2500000002 HC RX 250 W HCPCS SELF ADMINISTERED DRUGS (ALT 637 FOR MEDICARE OP, ALT 636 FOR OP/ED)

## 2024-11-04 PROCEDURE — 2500000002 HC RX 250 W HCPCS SELF ADMINISTERED DRUGS (ALT 637 FOR MEDICARE OP, ALT 636 FOR OP/ED): Performed by: STUDENT IN AN ORGANIZED HEALTH CARE EDUCATION/TRAINING PROGRAM

## 2024-11-04 PROCEDURE — 71045 X-RAY EXAM CHEST 1 VIEW: CPT | Performed by: RADIOLOGY

## 2024-11-04 PROCEDURE — 82947 ASSAY GLUCOSE BLOOD QUANT: CPT

## 2024-11-04 PROCEDURE — 2500000001 HC RX 250 WO HCPCS SELF ADMINISTERED DRUGS (ALT 637 FOR MEDICARE OP): Performed by: STUDENT IN AN ORGANIZED HEALTH CARE EDUCATION/TRAINING PROGRAM

## 2024-11-04 PROCEDURE — 99232 SBSQ HOSP IP/OBS MODERATE 35: CPT | Performed by: STUDENT IN AN ORGANIZED HEALTH CARE EDUCATION/TRAINING PROGRAM

## 2024-11-04 PROCEDURE — 83735 ASSAY OF MAGNESIUM: CPT

## 2024-11-04 PROCEDURE — 36415 COLL VENOUS BLD VENIPUNCTURE: CPT

## 2024-11-04 PROCEDURE — 2500000001 HC RX 250 WO HCPCS SELF ADMINISTERED DRUGS (ALT 637 FOR MEDICARE OP)

## 2024-11-04 PROCEDURE — 2500000004 HC RX 250 GENERAL PHARMACY W/ HCPCS (ALT 636 FOR OP/ED)

## 2024-11-04 PROCEDURE — 2500000004 HC RX 250 GENERAL PHARMACY W/ HCPCS (ALT 636 FOR OP/ED): Performed by: STUDENT IN AN ORGANIZED HEALTH CARE EDUCATION/TRAINING PROGRAM

## 2024-11-04 PROCEDURE — 85027 COMPLETE CBC AUTOMATED: CPT

## 2024-11-04 PROCEDURE — 1100000001 HC PRIVATE ROOM DAILY

## 2024-11-04 PROCEDURE — 99232 SBSQ HOSP IP/OBS MODERATE 35: CPT | Performed by: NURSE PRACTITIONER

## 2024-11-04 RX ORDER — SENNOSIDES 8.6 MG/1
1 TABLET ORAL NIGHTLY
Status: DISCONTINUED | OUTPATIENT
Start: 2024-11-04 | End: 2024-11-12

## 2024-11-04 RX ORDER — LANOLIN ALCOHOL/MO/W.PET/CERES
1000 CREAM (GRAM) TOPICAL DAILY
Status: DISCONTINUED | OUTPATIENT
Start: 2024-11-04 | End: 2024-11-12 | Stop reason: HOSPADM

## 2024-11-04 RX ORDER — TAMSULOSIN HYDROCHLORIDE 0.4 MG/1
0.4 CAPSULE ORAL NIGHTLY
Status: DISCONTINUED | OUTPATIENT
Start: 2024-11-04 | End: 2024-11-12 | Stop reason: HOSPADM

## 2024-11-04 RX ADMIN — ACETAMINOPHEN 650 MG: 325 TABLET, FILM COATED ORAL at 03:57

## 2024-11-04 RX ADMIN — TAMSULOSIN HYDROCHLORIDE 0.4 MG: 0.4 CAPSULE ORAL at 08:29

## 2024-11-04 RX ADMIN — Medication 6 MG: at 21:48

## 2024-11-04 RX ADMIN — ACETAMINOPHEN 650 MG: 325 TABLET, FILM COATED ORAL at 10:08

## 2024-11-04 RX ADMIN — ENOXAPARIN SODIUM 30 MG: 30 INJECTION SUBCUTANEOUS at 21:48

## 2024-11-04 RX ADMIN — TAMSULOSIN HYDROCHLORIDE 0.4 MG: 0.4 CAPSULE ORAL at 21:48

## 2024-11-04 RX ADMIN — CEFTRIAXONE SODIUM 1 G: 1 INJECTION, SOLUTION INTRAVENOUS at 17:20

## 2024-11-04 RX ADMIN — CYANOCOBALAMIN TAB 1000 MCG 1000 MCG: 1000 TAB at 14:57

## 2024-11-04 RX ADMIN — ACETAMINOPHEN 650 MG: 325 TABLET, FILM COATED ORAL at 21:48

## 2024-11-04 RX ADMIN — OXYCODONE HYDROCHLORIDE 5 MG: 5 TABLET ORAL at 04:15

## 2024-11-04 RX ADMIN — ENOXAPARIN SODIUM 30 MG: 30 INJECTION SUBCUTANEOUS at 08:29

## 2024-11-04 RX ADMIN — METOPROLOL TARTRATE 12.5 MG: 25 TABLET, FILM COATED ORAL at 08:29

## 2024-11-04 RX ADMIN — ACETAMINOPHEN 650 MG: 325 TABLET, FILM COATED ORAL at 14:57

## 2024-11-04 RX ADMIN — METOPROLOL TARTRATE 12.5 MG: 25 TABLET, FILM COATED ORAL at 21:48

## 2024-11-04 ASSESSMENT — PAIN DESCRIPTION - LOCATION: LOCATION: HIP

## 2024-11-04 ASSESSMENT — COGNITIVE AND FUNCTIONAL STATUS - GENERAL
HELP NEEDED FOR BATHING: A LITTLE
CLIMB 3 TO 5 STEPS WITH RAILING: A LOT
STANDING UP FROM CHAIR USING ARMS: A LITTLE
TURNING FROM BACK TO SIDE WHILE IN FLAT BAD: A LITTLE
MOVING TO AND FROM BED TO CHAIR: A LITTLE
WALKING IN HOSPITAL ROOM: A LOT
DRESSING REGULAR UPPER BODY CLOTHING: A LITTLE
EATING MEALS: A LITTLE
TOILETING: A LOT
MOVING FROM LYING ON BACK TO SITTING ON SIDE OF FLAT BED WITH BEDRAILS: A LITTLE
MOBILITY SCORE: 16
DAILY ACTIVITIY SCORE: 17
PERSONAL GROOMING: A LITTLE
DRESSING REGULAR LOWER BODY CLOTHING: A LITTLE

## 2024-11-04 ASSESSMENT — ACTIVITIES OF DAILY LIVING (ADL): LACK_OF_TRANSPORTATION: NO

## 2024-11-04 ASSESSMENT — PAIN DESCRIPTION - ORIENTATION: ORIENTATION: RIGHT

## 2024-11-04 ASSESSMENT — PAIN SCALES - GENERAL
PAINLEVEL_OUTOF10: 8
PAINLEVEL_OUTOF10: 0 - NO PAIN
PAINLEVEL_OUTOF10: 0 - NO PAIN
PAINLEVEL_OUTOF10: 9

## 2024-11-04 ASSESSMENT — PAIN DESCRIPTION - DESCRIPTORS
DESCRIPTORS: ACHING
DESCRIPTORS: ACHING

## 2024-11-04 ASSESSMENT — PAIN SCALES - PAIN ASSESSMENT IN ADVANCED DEMENTIA (PAINAD): TOTALSCORE: MEDICATION (SEE MAR);COLD PACK

## 2024-11-04 ASSESSMENT — PAIN - FUNCTIONAL ASSESSMENT
PAIN_FUNCTIONAL_ASSESSMENT: 0-10

## 2024-11-04 ASSESSMENT — PAIN SCALES - WONG BAKER: WONGBAKER_NUMERICALRESPONSE: HURTS LITTLE BIT

## 2024-11-04 NOTE — PROGRESS NOTES
[facility]  TRAUMA SERVICE - PROGRESS NOTE    Patient Name: Tommy Richmond  MRN: [id number]  Admit Date: 1031  : 1935  AGE: 89 y.o.   GENDER: male  8027/8027-A  ==============================================================================  MECHANISM OF INJURY:   89-year-old male who presented after a ground-level fall of unknown mechanism. Due to patient's confusion, history obtained via chart review and conversation w/ patient's son.   LOC (yes/no?): No  Anticoagulant / Anti-platelet Rx? (for what dx?): No  Referring Facility Name (N/A for scene EMR run): [facility]     No acute overnight events     INJURIES:   Left Hemo/Pneumothorax  L1 Compression fracture  Fracture of left ribs 5-7     OTHER MEDICAL PROBLEMS:  Recurrent UTIs with stage III Kidney disease  HTN  Glaucoma  BPH     INCIDENTAL FINDINGS:  Complex kidney cyst  Cholelithiasis    ==============================================================================  TODAY'S ASSESSMENT AND PLAN OF CARE:  89 y/o male s/p fall with L rib fractures and associated hemopneumothorax, now on the floor from the TICU after pain and O2 requirements controlled. Chest tube replaced overnight as imaging noted pigtail inappropriately placed in the abdomen. Patient's abdominal exam is benign this AM, no indication from imaging of damage to any left-sided structures (output from chest tube prior to replacement was straw-colored without succus).     - New chest tube to suction (-20) today  - Appreciate input from geriatrics team  - Anticipate discharge to assisted living facility with home PT/OT after chest tube is removed; appreciate input from PT/OT teams.  - Continue ceftriaxone for UTI; patient will discharge on cefixime.   - Okay for regular diet; bowel regimen w/ ducolax, miralax, as patient has not had regular bowel movements in this hospital thus far.     Plans discussed with Dr. Bergman, attending    Roger Gaytan,  MD  PGY4 General Surgery  Trauma Surgery [id number]    ==============================================================================  CHIEF COMPLAINT / OVERNIGHT EVENTS:   Chest tube replaced overnight as imaging noted pigtail inappropriately placed in the abdomen. Pigtail now noted to be in the chest. Family notified this AM by Dr. Roger Gaytan.    MEDICAL HISTORY / ROS:  Admission history and ROS reviewed. Pertinent changes as follows:    PHYSICAL EXAM:  Heart Rate:  [69-83]   Temp:  [35.8 °C (96.5 °F)-36.7 °C (98 °F)]   Resp:  [16-18]   BP: (151-199)/(76-97)   SpO2:  [96 %-98 %]   Physical Exam  Constitutional:       General: He is awake. He is not in acute distress.     Comments: Patient lying comfortably in bed, oriented to self/situation only. Mental status improved this AM compared to yesterday. Amnestic to most events of the last week. Is able to say that he fell at home.   HENT:      Head: Normocephalic and atraumatic.      Right Ear: External ear normal.      Left Ear: External ear normal.      Nose: Nose normal.      Mouth/Throat:      Mouth: Mucous membranes are moist.   Eyes:      Extraocular Movements: Extraocular movements intact.      Conjunctiva/sclera: Conjunctivae normal.      Pupils: Pupils are equal, round, and reactive to light.   Cardiovascular:      Rate and Rhythm: Normal rate and regular rhythm.      Pulses: Normal pulses.           Radial pulses are 2+ on the right side and 2+ on the left side.        Dorsalis pedis pulses are 2+ on the right side and 2+ on the left side.      Heart sounds: Normal heart sounds, S1 normal and S2 normal. Heart sounds not distant. No murmur heard.  Pulmonary:      Effort: Pulmonary effort is normal.      Breath sounds: Normal breath sounds.      Comments: Equal chest rise and fall.No respiratory distress.   Chest:      Comments: There is left lower chest wall ecchymosis with tenderness. Chest tube in place.   Abdominal:      General: Abdomen is flat. There  is no distension.      Palpations: Abdomen is soft.      Tenderness: There is no abdominal tenderness.      Comments: There is Left quadrant ecchymosis extending to the midline abdomen.    Genitourinary:     Penis: Normal.    Musculoskeletal:         General: Normal range of motion.      Cervical back: Full passive range of motion without pain and normal range of motion. No tenderness.      Right lower leg: No edema.      Left lower leg: No edema.   Skin:     General: Skin is warm.   Neurological:      General: No focal deficit present.      Mental Status: He is alert. He is disoriented.      Cranial Nerves: No cranial nerve deficit.      Sensory: No sensory deficit.      Motor: No weakness.   Psychiatric:         Mood and Affect: Mood normal.         Behavior: Behavior is cooperative.    IMAGING SUMMARY:  (summary of new imaging findings, not a copy of dictation)  CXR with improved aeration and no pneumothorax    LABS:  Results from last 7 days   Lab Units 11/04/24  0654 11/03/24  0901 11/02/24  1233 10/31/24  1615 10/31/24  0824   WBC AUTO x10*3/uL 6.9 7.2 7.6   < > 10.2   HEMOGLOBIN g/dL 8.5* 8.4* 9.0*   < > 7.6*   HEMATOCRIT % 26.9* 26.5* 29.0*   < > 22.1*   PLATELETS AUTO x10*3/uL 230 237 234   < > 192   NEUTROS PCT AUTO %  --   --   --   --  93.2   LYMPHS PCT AUTO %  --   --   --   --  2.2   MONOS PCT AUTO %  --   --   --   --  2.9   EOS PCT AUTO %  --   --   --   --  0.1    < > = values in this interval not displayed.         Results from last 7 days   Lab Units 11/04/24  0654 11/03/24  0901 11/02/24  1233 10/31/24  1417 10/30/24  1909   SODIUM mmol/L 141 140 140   < > 140   POTASSIUM mmol/L 3.9 4.1 3.9   < > 4.5   CHLORIDE mmol/L 109* 106 109*   < > 110*   CO2 mmol/L 25 28 24   < > 23   BUN mg/dL 33* 43* 37*   < > 73*   CREATININE mg/dL 1.05 1.67* 1.11   < > 1.87*   CALCIUM mg/dL 7.5* 7.8* 8.0*   < > 8.6   PROTEIN TOTAL g/dL  --   --   --   --  5.6*   BILIRUBIN TOTAL mg/dL  --   --   --   --  1.1   ALK PHOS  U/L  --   --   --   --  87   ALT U/L  --   --   --   --  27   AST U/L  --   --   --   --  29   GLUCOSE mg/dL 85 101* 131*   < > 87    < > = values in this interval not displayed.     Results from last 7 days   Lab Units 10/30/24  6729   BILIRUBIN TOTAL mg/dL 1.1           I have reviewed all medications, laboratory results, and imaging pertinent for today's encounter.

## 2024-11-04 NOTE — PROCEDURES
Left chest pigtail catheter placement.    Patient was consented for placement of left pigtail catheter. Window was selected approximately 2 rib spaces superior and 1 inch posterior (mid axillary line. Presence of fluid abutting lung parenchyma confirmed with ultrasound. Site was prepped with chlorhexidine and draped in a sterile fashion. A rib block was performed with 10mL of lidocaine. Next a needle was introduced into the pleural cavity and using seldinger technique a wire was advanced without resistance. A skin incision was made. fNext a dilator was advanced over the wire, and then a pigtail was introduced over the wire. There was a consistent flow of pleural fluid. The pigtail was secured to the skin with 2 silk sutures and connected to a pleura vac on low continuous -20mmHg suction. Tidaling was noted. It was dressed with a sterile dressing. Patient tolerated procedure well.    Kenneth Griffin MD  General Surgery, pgy4  Trauma [id number]

## 2024-11-04 NOTE — PROGRESS NOTES
Physical Therapy                 Therapy Communication Note    Patient Name: Tommy Richmond  MRN: [id number]  Department: [place] [address]  Room: 8027/8027-A  Today's Date: 11/4/2024     Discipline: Physical Therapy    Missed Visit Reason: Missed Visit Reason: Patient refused (due to feeling too full after eating lunch.  PT will re-attempt as schedule allows)    Missed Time: Attempt 6891

## 2024-11-04 NOTE — CARE PLAN
The patient's goals for the shift include      The clinical goals for the shift include remain free from falls and injuries      Problem: Skin  Goal: Decreased wound size/increased tissue granulation at next dressing change  Outcome: Progressing  Goal: Participates in plan/prevention/treatment measures  Outcome: Progressing  Goal: Prevent/manage excess moisture  Outcome: Progressing  Goal: Prevent/minimize sheer/friction injuries  Outcome: Progressing  Goal: Promote/optimize nutrition  Outcome: Progressing  Goal: Promote skin healing  Outcome: Progressing     Problem: Pain  Goal: Takes deep breaths with improved pain control throughout the shift  Outcome: Progressing  Goal: Turns in bed with improved pain control throughout the shift  Outcome: Progressing  Goal: Walks with improved pain control throughout the shift  Outcome: Progressing  Goal: Performs ADL's with improved pain control throughout shift  Outcome: Progressing  Goal: Participates in PT with improved pain control throughout the shift  Outcome: Progressing  Goal: Free from opioid side effects throughout the shift  Outcome: Progressing  Goal: Free from acute confusion related to pain meds throughout the shift  Outcome: Progressing     Problem: Pain - Adult  Goal: Verbalizes/displays adequate comfort level or baseline comfort level  Outcome: Progressing     Problem: Safety - Adult  Goal: Free from fall injury  Outcome: Progressing     Problem: Discharge Planning  Goal: Discharge to home or other facility with appropriate resources  Outcome: Progressing     Problem: Chronic Conditions and Co-morbidities  Goal: Patient's chronic conditions and co-morbidity symptoms are monitored and maintained or improved  Outcome: Progressing     Problem: Fall/Injury  Goal: Not fall by end of shift  Outcome: Progressing  Goal: Be free from injury by end of the shift  Outcome: Progressing  Goal: Verbalize understanding of personal risk factors for fall in the  hospital  Outcome: Progressing  Goal: Verbalize understanding of risk factor reduction measures to prevent injury from fall in the home  Outcome: Progressing  Goal: Use assistive devices by end of the shift  Outcome: Progressing  Goal: Pace activities to prevent fatigue by end of the shift  Outcome: Progressing

## 2024-11-04 NOTE — PROGRESS NOTES
11/04/24 2082   Discharge Planning   Living Arrangements Spouse/significant other   Support Systems Family members;Home care staff   Assistance Needed Patient requires assistance with ADLs   Type of Residence Private residence   Number of Stairs to Enter Residence 3   Number of Stairs Within Residence 0   Do you have animals or pets at home? No   Who is requesting discharge planning? Provider   Home or Post Acute Services In home services   Type of Home Care Services Home OT;Home PT;Home health aide   Expected Discharge Disposition Home H  (Assisted Living)   Does the patient need discharge transport arranged? No   RoundTrip coordination needed? No   Financial Resource Strain   How hard is it for you to pay for the very basics like food, housing, medical care, and heating? Not hard   Housing Stability   In the last 12 months, was there a time when you were not able to pay the mortgage or rent on time? N   In the past 12 months, how many times have you moved where you were living? 0   At any time in the past 12 months, were you homeless or living in a shelter (including now)? N   Transportation Needs   In the past 12 months, has lack of transportation kept you from medical appointments or from getting medications? no   In the past 12 months, has lack of transportation kept you from meetings, work, or from getting things needed for daily living? No   Patient Choice   Provider Choice list and CMS website (https://medicare.gov/care-compare#search) for post-acute Quality and Resource Measure Data were provided and reviewed with: Patient   Patient / Family choosing to utilize agency / facility established prior to hospitalization No     Transitional Care Coordinator Note: TCC spoke with patient's son introduced self and role to complete assessment (see above) and discuss discharge planning. Patient's son confirmed demographics:    Address: [address] [place], [place] [address] [place] [address]  Alternate/Emergency Contact: Yury Richmond  (son)  [phone]  Patient Contact: [phone]  DME: Cane, Walker, Wheelchair, Shower Chair   Homecare: Active with [facility] for [facility] Daily 3hrs/day  Oxygen Use: Denies oxygen, bipap or cpap  Diabetic: Denies   Dialysis: Denies   Falls: 1 Fall   PCP: Olegario Leo  Pharmacy: [facility] and mail order   Insurance: [employer]     Patient lives with wife in ranch style home. Patient required assistance with ADLs prior to admission. Assistance provided by HHA. Per patient's son plan for patient to discharge to [facility] facility [facility] ([address], [place] [address]). Patient discussed in morning rounds, per medical team (trauma) patient is not medically ready, chest tube remains in place. Discharge dispo: Patient evaluated by therapy team rec moderate intensity.  SW team informed and educated patient's son on therapy recs during previous conversation. Yury expressed understanding, however Yury declined plan for patient to discharge to SNF. Yury stated he would like for patient to discharge to [facility] (list ed above) with his wife and HC. Per Yury patient can move into AL apartment when medically ready. Per Yury plan to contact [facility] HC team to determine if agency offers PT and OT vs [facility] as AOC. Yury to call [facility] back with updates.         Cele Avila RN BSN   Transitional Care Coordinator

## 2024-11-04 NOTE — CARE PLAN
The patient's goals for the shift include  remaining safe throughout shift     The clinical goals for the shift include remain free of falls or injury        Problem: Skin  Goal: Decreased wound size/increased tissue granulation at next dressing change  Outcome: Progressing  Flowsheets (Taken 11/4/2024 0201)  Decreased wound size/increased tissue granulation at next dressing change:   Promote sleep for wound healing   Protective dressings over bony prominences  Goal: Participates in plan/prevention/treatment measures  Outcome: Progressing  Flowsheets (Taken 11/4/2024 0201)  Participates in plan/prevention/treatment measures:   Discuss with provider PT/OT consult   Elevate heels   Increase activity/out of bed for meals  Goal: Prevent/manage excess moisture  Outcome: Progressing  Flowsheets (Taken 11/4/2024 0201)  Prevent/manage excess moisture:   Cleanse incontinence/protect with barrier cream   Moisturize dry skin   Monitor for/manage infection if present  Goal: Prevent/minimize sheer/friction injuries  Outcome: Progressing  Flowsheets (Taken 11/4/2024 0201)  Prevent/minimize sheer/friction injuries:   HOB 30 degrees or less   Use pull sheet   Turn/reposition every 2 hours/use positioning/transfer devices  Goal: Promote/optimize nutrition  Outcome: Progressing  Flowsheets (Taken 11/4/2024 0201)  Promote/optimize nutrition:   Assist with feeding   Offer water/supplements/favorite foods   Monitor/record intake including meals  Goal: Promote skin healing  Outcome: Progressing  Flowsheets (Taken 11/4/2024 0201)  Promote skin healing:   Turn/reposition every 2 hours/use positioning/transfer devices   Protective dressings over bony prominences   Assess skin/pad under line(s)/device(s)     Problem: Pain  Goal: Takes deep breaths with improved pain control throughout the shift  Outcome: Progressing  Goal: Turns in bed with improved pain control throughout the shift  Outcome: Progressing  Goal: Walks with improved pain  control throughout the shift  Outcome: Progressing  Goal: Performs ADL's with improved pain control throughout shift  Outcome: Progressing  Goal: Participates in PT with improved pain control throughout the shift  Outcome: Progressing  Goal: Free from opioid side effects throughout the shift  Outcome: Progressing  Goal: Free from acute confusion related to pain meds throughout the shift  Outcome: Progressing     Problem: Pain - Adult  Goal: Verbalizes/displays adequate comfort level or baseline comfort level  Outcome: Progressing     Problem: Safety - Adult  Goal: Free from fall injury  Outcome: Progressing     Problem: Discharge Planning  Goal: Discharge to home or other facility with appropriate resources  Outcome: Progressing     Problem: Chronic Conditions and Co-morbidities  Goal: Patient's chronic conditions and co-morbidity symptoms are monitored and maintained or improved  Outcome: Progressing     Problem: Fall/Injury  Goal: Not fall by end of shift  Outcome: Progressing  Goal: Be free from injury by end of the shift  Outcome: Progressing  Goal: Verbalize understanding of personal risk factors for fall in the hospital  Outcome: Progressing  Goal: Verbalize understanding of risk factor reduction measures to prevent injury from fall in the home  Outcome: Progressing  Goal: Use assistive devices by end of the shift  Outcome: Progressing  Goal: Pace activities to prevent fatigue by end of the shift  Outcome: Progressing

## 2024-11-04 NOTE — PROGRESS NOTES
Subjective   Seen today for follow up visit.        Objective     Current Facility-Administered Medications   Medication Dose Route Frequency Provider Last Rate Last Admin    acetaminophen (Tylenol) tablet 650 mg  650 mg oral q6h Melba Castillo MD   650 mg at 11/04/24 0357    bisacodyl (Dulcolax) suppository 10 mg  10 mg rectal Daily Roger Gaytan MD   10 mg at 11/03/24 1047    cefTRIAXone (Rocephin) 1 g in dextrose (iso) IV 50 mL  1 g intravenous q24h Roger Gaytan MD   Stopped at 11/03/24 2342    enoxaparin (Lovenox) syringe 30 mg  30 mg subcutaneous BID Melba Castillo MD   30 mg at 11/04/24 0829    [Held by provider] furosemide (Lasix) tablet 20 mg  20 mg oral Daily Roger Gaytan MD        HYDROmorphone PF (Dilaudid) injection 0.2 mg  0.2 mg intravenous q3h PRN Melba Castillo MD   0.2 mg at 11/03/24 1210    lidocaine 4 % patch 1 patch  1 patch transdermal Daily Melba Castillo MD   1 patch at 11/03/24 0919    melatonin tablet 6 mg  6 mg oral Nightly Agata Monet, APRKT-CNP   6 mg at 11/03/24 2231    methocarbamol (Robaxin) tablet 500 mg  500 mg oral q8h PRN Roger aGytan MD   500 mg at 11/02/24 1620    metoprolol tartrate (Lopressor) tablet 12.5 mg  12.5 mg oral BID Albino León MD   12.5 mg at 11/04/24 0829    oxyCODONE (Roxicodone) immediate release tablet 2.5 mg  2.5 mg oral q4h PRN Roger Gaytan MD        oxyCODONE (Roxicodone) immediate release tablet 5 mg  5 mg oral q4h PRN Roger Gaytan MD   5 mg at 11/04/24 0415    oxygen (O2) therapy   inhalation Continuous PRN - O2/gases Edmundo Villa, DO   2 L/min at 11/01/24 0800    polyethylene glycol (Glycolax, Miralax) packet 17 g  17 g oral Daily Edmundo Villa, DO   17 g at 11/03/24 0919    tamsulosin (Flomax) 24 hr capsule 0.4 mg  0.4 mg oral Daily Melba Castillo MD   0.4 mg at 11/04/24 0829       Physical Exam  HENT:      Head: Normocephalic.      Nose: Nose normal.      Mouth/Throat:      Mouth:  Mucous membranes are moist.      Pharynx: Oropharynx is clear.   Eyes:      Conjunctiva/sclera: Conjunctivae normal.      Pupils: Pupils are equal, round, and reactive to light.   Cardiovascular:      Rate and Rhythm: Normal rate and regular rhythm.   Pulmonary:      Effort: Pulmonary effort is normal.      Breath sounds: Normal breath sounds.      Comments: Pigtail in place   Abdominal:      General: Bowel sounds are normal.      Palpations: Abdomen is soft.   Skin:     Findings: Bruising present.      Comments: Extensive ecchymosis left flank extending to abdomen    Neurological:      Mental Status: He is alert.   Psychiatric:         Mood and Affect: Mood is anxious.         Confusion Assessment Method(CAM) for diagnosis of delirium:    1.  Acute onset or fluctuating course: absent  2.  Inattention: absent  3.  Disorganized thinking: absent  4.  Altered level of consciousness: absent  CAM: negative    AT Score For Assessment of Delirium and Cognitive Impairment:    Alertness: 0  Normal(fully alert,but not agitated, throughout assessment)=0  Mild sleepiness for <10 seconds after walking, then normal=0  Clearly abnormal=4  2.  AMT4: 2  No mistakes=0  One mistake=1  Two or more mistakes/untestable=2  3.  Attention: 0  Achieves seven months or more correctly=0  Starts but scores <7 months/ refuses to start=1  Untestable(cannot start because unwell, drowsy, inattentive)=2  4.  Acute: 0  No=0  Yes=4    Total Score: 2  4 or above: Possible delirium +/- cognitive impairment  1-3: Possible cognitive impairment  0: Delirium or severe cognitive impairment unlikely(but delirium still possible if (4) information incomplete)      Last Recorded Vitals      11/3/2024    12:56 PM 11/3/2024     5:08 PM 11/3/2024     8:48 PM 11/3/2024    10:31 PM 11/3/2024    11:32 PM 11/4/2024     4:49 AM 11/4/2024     8:46 AM   Vitals   Systolic 137 160 161 161 199 162 157   Diastolic 78 89 89 89 97 76 76   Heart Rate 84 70 83 83 78 72 80   Temp  36.3 °C (97.4 °F) 35.8 °C (96.5 °F) 36.6 °C (97.9 °F)  36.7 °C (98 °F) 36.3 °C (97.3 °F) 36.4 °C (97.5 °F)   Resp 16 16 16  16 18 18      Vitals:    10/31/24 0554   Weight: 65.8 kg (145 lb)        Relevant Results  Lab Results   Component Value Date    TSH 1.47 09/26/2024    [id number] 236 07/08/2024     Results from last 7 days   Lab Units 11/04/24  0654 11/03/24  0901 11/02/24  1233 10/31/24  0824 10/30/24  1909   WBC AUTO x10*3/uL 6.9 7.2 7.6   < > 7.8   HEMOGLOBIN g/dL 8.5* 8.4* 9.0*   < > 8.2*   HEMATOCRIT % 26.9* 26.5* 29.0*   < > 26.1*   ALT U/L  --   --   --   --  27   AST U/L  --   --   --   --  29   SODIUM mmol/L 141 140 140   < > 140   POTASSIUM mmol/L 3.9 4.1 3.9   < > 4.5   CHLORIDE mmol/L 109* 106 109*   < > 110*   CREATININE mg/dL 1.05 1.67* 1.11   < > 1.87*   BUN mg/dL 33* 43* 37*   < > 73*   CO2 mmol/L 25 28 24   < > 23    < > = values in this interval not displayed.          XR chest 1 view  Narrative: Interpreted By:  Cezar Shields and Ritchie Brandon   STUDY:  XR CHEST 1 VIEW;  11/3/2024 8:01 pm      INDICATION:  Signs/Symptoms:pigtail placement.      COMPARISON:  Chest x-ray 11/03/2024, 5:04 a.m. CT chest 11/03/2024.      ACCESSION NUMBER(S):  [id number]      ORDERING CLINICIAN:  TESHA FUNK      FINDINGS:  AP radiograph of the chest was provided.      Left basilar approach pigtail catheter projecting over the mid lung  zone, slightly elevated when compared to the prior examination      CARDIOMEDIASTINAL SILHOUETTE:  Cardiomediastinal silhouette is stable in size and configuration.  Mild central pulmonary vascular congestion. Aortic knob calcification  is noted.      LUNGS:  Compared to prior examination there is intervally increased left  basilar/left retrocardiac airspace opacity with elevation of the left  hemidiaphragm and obscuration of the left costophrenic angle. Similar  right retrocardiac/right basilar airspace opacity. No evidence of  pneumothorax.      ABDOMEN:  No remarkable  upper abdominal findings.      BONES:  No acute osseous changes.      Impression: 1.  Interval elevation of the left-sided pigtail catheter, increased  left basilar/retrocardiac airspace opacity, and persistent  obscuration of the left costophrenic angle. Findings are favored to  represent a layering left-sided pleural effusion with increased  atelectasis.  2. Similar right basilar/retrocardiac airspace opacity, also favored  to represent atelectasis.  3. Mild central pulmonary vascular congestion.      I personally reviewed the images/study and I agree with the findings  as stated by resident Willard Walls. This study was interpreted  at [place], [place].      MACRO:  None      Signed by: Cezar Shields 11/4/2024 7:37 AM  Dictation workstation:   [id number]          DATA:  EKG: QTC  Encounter Date: 10/30/24   ECG 12 lead   Result Value    Ventricular Rate 86    Atrial Rate 86    OR Interval 190    QRS Duration 124    QT Interval 366    QTC Calculation(Bazett) 437    P Axis 24    R Axis -42    T Axis -74    QRS Count 14    Q Onset 225    P Onset 130    P Offset 189    T Offset 408    QTC Fredericia 412    Narrative    Normal sinus rhythm  Left axis deviation  Nonspecific intraventricular conduction delay  T wave abnormality, consider inferior ischemia  Abnormal ECG  When compared with ECG of 14-SEP-2024 17:26,  Nonspecific intraventricular conduction delay has replaced Incomplete right bundle branch block  Borderline criteria for Lateral infarct are no longer Present  See ED provider note for full interpretation and clinical correlation  Confirmed by Lucía Galindo ([id number]) on 11/1/2024 12:54:02 PM      Anti-psychotics in 48 hours: none   Opioids/Benzodiazepines in 48 hours: oxycodone   Anticholinergics on board:Yes - methocarbamol   Restraints:No  Indwelling catheters:No  Last BM: none recorded   UO in 24 hours: 850   Activity in the past 24 hours:  Need for  ambulatory devices: uses a walker at [facility]         Assessment/Plan     This is a/an 89 y.o. year old male, with past medical history relevant for recurrent UTIs, BPH, colon ca s/p resection, poor vision, gait difficulty, htn, presenting for falls w L multiple rib fx,  large posterior chest/abd wall hematoma, L ptx/rosalio, s/p L pigtail CT placement 10/31/24, being seen in geriatric consultation for comanagement.      Principal Problem:    Hemopneumothorax on left    1. Resolving hypoactive delirium  - CAM negative, 4 AT: 2  - Pain is well managed  - Last methocarbamol doses on 11/2- recommend avoiding use due to anticholinergic side effects and may worsen delirium  - Maintain delirium precautions     2. Recurrent falls  - Falls multifactorial- vision, gait difficulty  - PT/OT   - Plan is for moderate intensity rehab    - Recommend beginning cyanocobalamin 1000 mcg by mouth daily for low B12 level, 236     3. Pain due to multiple left rib fracture, large abdominal wall hematoma, s/p L CT placement for hemopneumothorax  - Pain well controlled  - Encourage use of incentive spirometer   - Continue O2     4. UTI  - Ceftriaxone   - Afebrile     5. BPH  - Tamsulosin 0.4 mg daily  -Recommend dosing Tamsulosin at night to decrease risk of daytime hypotension         4M AGE-FRIENDLY INITIATIVE:  What matters most to patient: Not to fall   Medications: Robaxin, oxycodone   Mentation: CAM negative, 4 AT: 2   Mobility: uses a walker     Geriatric medicine will continue to follow the patient. Thank you for allowing geriatric medicine to be involved in the care of your patient. Geriatric medicine consultation team is available during work hours Monday through Friday. For any emergency issues requiring immediate assistance over the weekend, please page Geriatrics pager [phone]    Elizabeth Hathaway, APRN-CNP

## 2024-11-04 NOTE — PROGRESS NOTES
Patient discussed during morning rounds. He is not medically ready for discharge at this time, as he has a L pigtail in place, set to suction. SW spoke with patient son, Yury [phone], who confirmed that the plan is for patient to discharge to AL with his wife. This AL can provide some PT/OT, as well as memory care. SW did confirm that patient is not ready at this time due to the pigtail. SW will continue to follow to assist with the discharge plan.       JOHN Roldan

## 2024-11-05 ENCOUNTER — APPOINTMENT (OUTPATIENT)
Dept: RADIOLOGY | Facility: HOSPITAL | Age: 89
DRG: 183 | End: 2024-11-05
Payer: MEDICARE

## 2024-11-05 LAB
AORTIC VALVE PEAK VELOCITY: 1.29 M/S
AV PEAK GRADIENT: 7 MMHG
AVA (PEAK VEL): 2.4 CM2
BODY SURFACE AREA: 1.75 M2
EJECTION FRACTION APICAL 4 CHAMBER: 60
EJECTION FRACTION: 51 %
LEFT ATRIUM VOLUME AREA LENGTH INDEX BSA: 41.3 ML/M2
LEFT VENTRICLE INTERNAL DIMENSION DIASTOLE: 4.8 CM (ref 3.5–6)
LEFT VENTRICULAR OUTFLOW TRACT DIAMETER: 1.9 CM
MITRAL VALVE E/A RATIO: 0.64
RIGHT VENTRICLE FREE WALL PEAK S': 11.5 CM/S
RIGHT VENTRICLE PEAK SYSTOLIC PRESSURE: 21.5 MMHG
TRICUSPID ANNULAR PLANE SYSTOLIC EXCURSION: 2.5 CM

## 2024-11-05 PROCEDURE — 2500000005 HC RX 250 GENERAL PHARMACY W/O HCPCS

## 2024-11-05 PROCEDURE — 99232 SBSQ HOSP IP/OBS MODERATE 35: CPT | Performed by: NURSE PRACTITIONER

## 2024-11-05 PROCEDURE — 2500000002 HC RX 250 W HCPCS SELF ADMINISTERED DRUGS (ALT 637 FOR MEDICARE OP, ALT 636 FOR OP/ED)

## 2024-11-05 PROCEDURE — 71045 X-RAY EXAM CHEST 1 VIEW: CPT

## 2024-11-05 PROCEDURE — 71045 X-RAY EXAM CHEST 1 VIEW: CPT | Performed by: RADIOLOGY

## 2024-11-05 PROCEDURE — 2500000004 HC RX 250 GENERAL PHARMACY W/ HCPCS (ALT 636 FOR OP/ED)

## 2024-11-05 PROCEDURE — 2500000001 HC RX 250 WO HCPCS SELF ADMINISTERED DRUGS (ALT 637 FOR MEDICARE OP): Performed by: STUDENT IN AN ORGANIZED HEALTH CARE EDUCATION/TRAINING PROGRAM

## 2024-11-05 PROCEDURE — 1100000001 HC PRIVATE ROOM DAILY

## 2024-11-05 PROCEDURE — 2500000004 HC RX 250 GENERAL PHARMACY W/ HCPCS (ALT 636 FOR OP/ED): Performed by: STUDENT IN AN ORGANIZED HEALTH CARE EDUCATION/TRAINING PROGRAM

## 2024-11-05 PROCEDURE — 2500000001 HC RX 250 WO HCPCS SELF ADMINISTERED DRUGS (ALT 637 FOR MEDICARE OP)

## 2024-11-05 PROCEDURE — 99232 SBSQ HOSP IP/OBS MODERATE 35: CPT | Performed by: STUDENT IN AN ORGANIZED HEALTH CARE EDUCATION/TRAINING PROGRAM

## 2024-11-05 RX ORDER — OLANZAPINE 10 MG/2ML
2.5 INJECTION, POWDER, FOR SOLUTION INTRAMUSCULAR ONCE
Status: COMPLETED | OUTPATIENT
Start: 2024-11-05 | End: 2024-11-05

## 2024-11-05 RX ORDER — QUETIAPINE FUMARATE 25 MG/1
12.5 TABLET, FILM COATED ORAL ONCE
Status: DISCONTINUED | OUTPATIENT
Start: 2024-11-05 | End: 2024-11-05

## 2024-11-05 RX ORDER — TALC
9 POWDER (GRAM) TOPICAL NIGHTLY
Status: DISCONTINUED | OUTPATIENT
Start: 2024-11-05 | End: 2024-11-12 | Stop reason: HOSPADM

## 2024-11-05 RX ORDER — QUETIAPINE FUMARATE 25 MG/1
25 TABLET, FILM COATED ORAL ONCE
Status: COMPLETED | OUTPATIENT
Start: 2024-11-05 | End: 2024-11-05

## 2024-11-05 RX ADMIN — CEFTRIAXONE SODIUM 1 G: 1 INJECTION, SOLUTION INTRAVENOUS at 16:52

## 2024-11-05 RX ADMIN — OLANZAPINE 2.5 MG: 10 INJECTION, POWDER, LYOPHILIZED, FOR SOLUTION INTRAMUSCULAR at 03:21

## 2024-11-05 RX ADMIN — TAMSULOSIN HYDROCHLORIDE 0.4 MG: 0.4 CAPSULE ORAL at 21:18

## 2024-11-05 RX ADMIN — ACETAMINOPHEN 650 MG: 325 TABLET, FILM COATED ORAL at 21:17

## 2024-11-05 RX ADMIN — Medication 9 MG: at 21:17

## 2024-11-05 RX ADMIN — METOPROLOL TARTRATE 12.5 MG: 25 TABLET, FILM COATED ORAL at 21:17

## 2024-11-05 RX ADMIN — OLANZAPINE 2.5 MG: 10 INJECTION, POWDER, FOR SOLUTION INTRAMUSCULAR at 07:39

## 2024-11-05 RX ADMIN — ENOXAPARIN SODIUM 30 MG: 30 INJECTION SUBCUTANEOUS at 08:40

## 2024-11-05 RX ADMIN — QUETIAPINE FUMARATE 25 MG: 25 TABLET ORAL at 21:17

## 2024-11-05 ASSESSMENT — COGNITIVE AND FUNCTIONAL STATUS - GENERAL
TURNING FROM BACK TO SIDE WHILE IN FLAT BAD: A LOT
HELP NEEDED FOR BATHING: A LITTLE
CLIMB 3 TO 5 STEPS WITH RAILING: A LOT
EATING MEALS: A LITTLE
MOBILITY SCORE: 12
MOVING FROM LYING ON BACK TO SITTING ON SIDE OF FLAT BED WITH BEDRAILS: A LOT
MOVING TO AND FROM BED TO CHAIR: A LOT
STANDING UP FROM CHAIR USING ARMS: A LOT
WALKING IN HOSPITAL ROOM: A LOT
DRESSING REGULAR UPPER BODY CLOTHING: A LITTLE
DRESSING REGULAR LOWER BODY CLOTHING: A LOT
DAILY ACTIVITIY SCORE: 17
TOILETING: A LITTLE
PERSONAL GROOMING: A LITTLE

## 2024-11-05 ASSESSMENT — PAIN SCALES - GENERAL
PAINLEVEL_OUTOF10: 0 - NO PAIN
PAINLEVEL_OUTOF10: 0 - NO PAIN

## 2024-11-05 ASSESSMENT — PAIN SCALES - WONG BAKER: WONGBAKER_NUMERICALRESPONSE: NO HURT

## 2024-11-05 NOTE — CARE PLAN
The patient's goals for the shift include      The clinical goals for the shift include pt will get rest

## 2024-11-05 NOTE — NURSING NOTE
Pt increasingly confused during PM, pt pulling at chest tube and trying to get out of bed, RN tried multiple times at redirection and reorientation that were unsuccessful. Trauma PA made aware given orders for Zyprexa 2.5mg given for agitation.

## 2024-11-05 NOTE — PROGRESS NOTES
Physical Therapy                 Therapy Communication Note    Patient Name: Tommy Richmond  MRN: [id number]  Department: [place] [address]  Room: 8027/8027-A  Today's Date: 11/5/2024     Discipline: Physical Therapy    Missed Visit Reason: Missed Visit Reason: Patient refused (PT will re-attempt as schedule allows)    Missed Time: Attempt [id number]

## 2024-11-05 NOTE — PROGRESS NOTES
Subjective   Seen today for follow up visit. Patient with overnight agitation, he removed his chest tube. He received 2 doses of olanzapine with improvement.     This morning patient is alert, but is not sure if he is at a hospital or at a fair. He states the tv has told him to remove the tube. He does not recall the events of last night. He denies pain. He denies pain at the chest tube site. He denies shortness of breath and chest pain. He is eating well, no n/v.        Objective     Current Facility-Administered Medications   Medication Dose Route Frequency Provider Last Rate Last Admin    acetaminophen (Tylenol) tablet 650 mg  650 mg oral q6h Melba Castillo MD   650 mg at 11/04/24 2148    bisacodyl (Dulcolax) suppository 10 mg  10 mg rectal Daily Roger Gaytan MD   10 mg at 11/03/24 1047    cefTRIAXone (Rocephin) 1 g in dextrose (iso) IV 50 mL  1 g intravenous q24h Roger Gaytan MD   Stopped at 11/04/24 1759    cyanocobalamin (Vitamin B-12) tablet 1,000 mcg  1,000 mcg oral Daily Roger Gaytan MD   1,000 mcg at 11/04/24 1457    enoxaparin (Lovenox) syringe 30 mg  30 mg subcutaneous BID Melba Castillo MD   30 mg at 11/05/24 0840    [Held by provider] furosemide (Lasix) tablet 20 mg  20 mg oral Daily Roger Gaytan MD        HYDROmorphone PF (Dilaudid) injection 0.2 mg  0.2 mg intravenous q3h PRN Melba Castillo MD   0.2 mg at 11/03/24 1210    lidocaine 4 % patch 1 patch  1 patch transdermal Daily Melba Castillo MD   1 patch at 11/03/24 0919    melatonin tablet 9 mg  9 mg oral Nightly Roger Gaytan MD        methocarbamol (Robaxin) tablet 500 mg  500 mg oral q8h PRN Roger Gaytan MD   500 mg at 11/02/24 1620    metoprolol tartrate (Lopressor) tablet 12.5 mg  12.5 mg oral BID Albino León MD   12.5 mg at 11/04/24 2148    oxyCODONE (Roxicodone) immediate release tablet 2.5 mg  2.5 mg oral q4h PRN Roger Gaytan MD        oxyCODONE (Roxicodone) immediate release  tablet 5 mg  5 mg oral q4h PRN Roger Gaytan MD   5 mg at 11/04/24 0415    oxygen (O2) therapy   inhalation Continuous PRN - O2/gases Edmundo Villa, DO   2 L/min at 11/01/24 0800    polyethylene glycol (Glycolax, Miralax) packet 17 g  17 g oral Daily Edmundo Villa, DO   17 g at 11/03/24 0919    QUEtiapine (SEROquel) tablet 25 mg  25 mg oral Once Roger Gaytan MD        sennosides (Senokot) tablet 8.6 mg  1 tablet oral Nightly Roger Gaytan MD        tamsulosin (Flomax) 24 hr capsule 0.4 mg  0.4 mg oral Nightly Roger Gaytan MD   0.4 mg at 11/04/24 1806       Physical Exam  HENT:      Head: Normocephalic.      Nose: Nose normal.      Mouth/Throat:      Mouth: Mucous membranes are moist.   Eyes:      Extraocular Movements: Extraocular movements intact.      Conjunctiva/sclera: Conjunctivae normal.      Pupils: Pupils are equal, round, and reactive to light.   Cardiovascular:      Rate and Rhythm: Normal rate and regular rhythm.      Heart sounds: Normal heart sounds.   Pulmonary:      Effort: Pulmonary effort is normal.      Breath sounds: Normal breath sounds.      Comments: Chest tube in place   Abdominal:      General: There is no distension.      Palpations: Abdomen is soft.      Tenderness: There is no abdominal tenderness.   Musculoskeletal:      Right lower leg: No edema.      Left lower leg: No edema.   Skin:     Findings: Bruising present.      Comments: Ecchymosis left flank extending to mid abdomen     Neurological:      Mental Status: He is alert.   Psychiatric:         Attention and Perception: He is inattentive. He perceives visual hallucinations.         Speech: Speech is tangential.         Thought Content: Thought content is paranoid and delusional.         Cognition and Memory: Cognition is impaired. Memory is impaired.         Judgment: Judgment is impulsive.      Comments: Patient states the people on the television told him to remove the tube.          Confusion  Assessment Method(CAM) for diagnosis of delirium:    1.  Acute onset or fluctuating course: present  2.  Inattention: present  3.  Disorganized thinking: present  4.  Altered level of consciousness: absent  CAM: positive    AT Score For Assessment of Delirium and Cognitive Impairment:    Alertness: 0  Normal(fully alert,but not agitated, throughout assessment)=0  Mild sleepiness for <10 seconds after walking, then normal=0  Clearly abnormal=4  2.  AMT4: 2  No mistakes=0  One mistake=1  Two or more mistakes/untestable=2  3.  Attention: 2  Achieves seven months or more correctly=0  Starts but scores <7 months/ refuses to start=1  Untestable(cannot start because unwell, drowsy, inattentive)=2  4.  Acute: 4  No=0  Yes=4    Total Score: 8  4 or above: Possible delirium +/- cognitive impairment  1-3: Possible cognitive impairment  0: Delirium or severe cognitive impairment unlikely(but delirium still possible if (4) information incomplete)      Last Recorded Vitals      11/4/2024     8:46 AM 11/4/2024    11:00 AM 11/4/2024     4:06 PM 11/4/2024     9:09 PM 11/5/2024     1:00 AM 11/5/2024     4:00 AM 11/5/2024     8:23 AM   Vitals   Systolic 157 151 116 157 170 150 179   Diastolic 76 76 60 76 79 74 65   Heart Rate 80 69 76 94 92 84 80   Temp 36.4 °C (97.5 °F) 36.2 °C (97.2 °F) 35.9 °C (96.6 °F) 37 °C (98.6 °F) 36.1 °C (97 °F) 37 °C (98.6 °F) 36.3 °C (97.4 °F)   Resp 18 17 18 18 17 17 18      Vitals:    10/31/24 0554   Weight: 65.8 kg (145 lb)        Relevant Results  Lab Results   Component Value Date    TSH 1.47 09/26/2024    [id number] 236 07/08/2024     Results from last 7 days   Lab Units 11/04/24  0654 11/03/24  0901 11/02/24  1233 10/31/24  0824 10/30/24  1909   WBC AUTO x10*3/uL 6.9 7.2 7.6   < > 7.8   HEMOGLOBIN g/dL 8.5* 8.4* 9.0*   < > 8.2*   HEMATOCRIT % 26.9* 26.5* 29.0*   < > 26.1*   ALT U/L  --   --   --   --  27   AST U/L  --   --   --   --  29   SODIUM mmol/L 141 140 140   < > 140   POTASSIUM mmol/L 3.9 4.1  3.9   < > 4.5   CHLORIDE mmol/L 109* 106 109*   < > 110*   CREATININE mg/dL 1.05 1.67* 1.11   < > 1.87*   BUN mg/dL 33* 43* 37*   < > 73*   CO2 mmol/L 25 28 24   < > 23    < > = values in this interval not displayed.          XR chest 1 view  Narrative: Interpreted By:  Vitor Shields,   STUDY:  XR CHEST 1 VIEW;  11/4/2024 4:57 am      INDICATION:  Signs/Symptoms:Evaluate chest tube status.      COMPARISON:  Chest radiograph dated 11/3/2024      ACCESSION NUMBER(S):  [id number]      ORDERING CLINICIAN:  VITOR FARAH      FINDINGS:  AP radiograph of the chest      Medical devices: Pigtail thoracostomy tube is noted within the left  lung base similar previous study.      The heart is maximum normal in size. Atheromatous disease of the  aortic arch is noted.      Left lower lobe infiltrate linear subsegmental atelectasis is noted  above the left diaphragm. Blunting of the left costophrenic sulcus is  noted.      There is subsegmental atelectasis and vascular crowding within the  right lower lobe medially.      Bones are diffusely osteoporotic.      Impression: 1. Left lower lobe infiltrate and subsegmental atelectasis above the  mildly elevated left diaphragm  2. Subsegmental atelectasis and vascular crowding within the right  lower lobe medially              Signed by: Vitor Shields 11/4/2024 12:55 PM  Dictation workstation:   [id number]  XR chest 1 view  Narrative: Interpreted By:  Vitor Shields and Ritchie Brandon   STUDY:  XR CHEST 1 VIEW;  11/3/2024 8:01 pm      INDICATION:  Signs/Symptoms:pigtail placement.      COMPARISON:  Chest x-ray 11/03/2024, 5:04 a.m. CT chest 11/03/2024.      ACCESSION NUMBER(S):  [id number]      ORDERING CLINICIAN:  TESHA FUNK      FINDINGS:  AP radiograph of the chest was provided.      Left basilar approach pigtail catheter projecting over the mid lung  zone, slightly elevated when compared to the prior examination      CARDIOMEDIASTINAL SILHOUETTE:  Cardiomediastinal  silhouette is stable in size and configuration.  Mild central pulmonary vascular congestion. Aortic knob calcification  is noted.      LUNGS:  Compared to prior examination there is intervally increased left  basilar/left retrocardiac airspace opacity with elevation of the left  hemidiaphragm and obscuration of the left costophrenic angle. Similar  right retrocardiac/right basilar airspace opacity. No evidence of  pneumothorax.      ABDOMEN:  No remarkable upper abdominal findings.      BONES:  No acute osseous changes.      Impression: 1.  Interval elevation of the left-sided pigtail catheter, increased  left basilar/retrocardiac airspace opacity, and persistent  obscuration of the left costophrenic angle. Findings are favored to  represent a layering left-sided pleural effusion with increased  atelectasis.  2. Similar right basilar/retrocardiac airspace opacity, also favored  to represent atelectasis.  3. Mild central pulmonary vascular congestion.      I personally reviewed the images/study and I agree with the findings  as stated by resident Willard Walls. This study was interpreted  at [place], [place].      MACRO:  None      Signed by: Cezar Shields 11/4/2024 7:37 AM  Dictation workstation:   [id number]          DATA:  EKG: QTC  Encounter Date: 10/30/24   ECG 12 lead   Result Value    Ventricular Rate 86    Atrial Rate 86    HI Interval 190    QRS Duration 124    QT Interval 366    QTC Calculation(Bazett) 437    P Axis 24    R Axis -42    T Axis -74    QRS Count 14    Q Onset 225    P Onset 130    P Offset 189    T Offset 408    QTC Fredericia 412    Narrative    Normal sinus rhythm  Left axis deviation  Nonspecific intraventricular conduction delay  T wave abnormality, consider inferior ischemia  Abnormal ECG  When compared with ECG of 14-SEP-2024 17:26,  Nonspecific intraventricular conduction delay has replaced Incomplete right bundle branch block  Borderline  criteria for Lateral infarct are no longer Present  See ED provider note for full interpretation and clinical correlation  Confirmed by Lucía Galindo ([id number]) on 11/1/2024 12:54:02 PM      Anti-psychotics in 48 hours: olanzapine   Opioids/Benzodiazepines in 48 hours: oxycodone   Anticholinergics on board:No  Restraints:No  Indwelling catheters:No  Last BM: 11/4   UO in 24 hours: 350 mL   Activity in the past 24 hours: working with PT   Need for ambulatory devices: 2 person heavy assist           Assessment/Plan     This is a/an 89 y.o. year old male, with past medical history relevant for recurrent UTIs, BPH, colon ca s/p resection, poor vision, gait difficulty, htn, presenting for falls w L multiple rib fx, large posterior chest/abd wall hematoma, L ptx/rosalio, s/p L pigtail CT placement 10/31/24, being seen in geriatric consultation for comanagement.     Principal Problem:    Hemopneumothorax on left    1. Acute mixed (hyperactive/hypoactive) delirium   - CAM positive, 4 AT: 8   - Pain well controlled  - Patient is voiding well  - Recommend beginning melatonin 3 mg at 7 pm  - Give quetiapine 25 mg at 9pm scheduled   - Please follow Qtc  - Keep patient active during the day, which may help patient sleep at night   - UTI may be contributing to delirium     Please consider the following general measures for minimizing delirium in a hospitalized patient:   -Bright lights during the day, keeps blinds up, switch all lights on   -avoid disturbances at night. Encourage at least 6 hours uninterrupted sleep. Consider d/c 4am vitals check  -avoid benzodiazepines, sedatives. Minimize opioids   -daily orientation to time and place by the staff   -out of bed to chair few hours everyday  - encourage stimulating activities during the day if possible     2. Acute UTI   - Afebrile  - Continue Ceftriaxone until 11/13     3. Rib fracture, large abdominal wall hematoma, s/p L CT placement for hemopneumothorax  - Pain controlled with  scheduled acetaminophen and as needed oxycodone         4M AGE-FRIENDLY INITIATIVE:  What matters most to patient: Not to fall   Medications: oxycodone, methocarbamol, olanzapine   Mentation: CAM positive, 4 AT: 8  Mobility: uses a walker     Geriatric medicine will continue to follow the patient. Thank you for allowing geriatric medicine to be involved in the care of your patient. Geriatric medicine consultation team is available during work hours Monday through Friday. For any emergency issues requiring immediate assistance over the weekend, please page Geriatrics pager [phone]    Elizabeth Hathaway, APRN-CNP

## 2024-11-05 NOTE — PROGRESS NOTES
Transitional Care Coordinator Note: Patient discussed in morning rounds, per medical team (trauma) patient is not medically ready, plan to remove chest tube. Discharge dispo: Plan for patient to discharge to [place] [facility] ([address], [place] [address]) with  when medically ready. Patient evaluated by therapy team rec moderate intensity. Patient's son Yury Richmond [phone] updated on therapy recs. Yury expressed understanding and declined SNF placement. Trauma team updated and plans to call patient's son to discuss therapy recs and possible placement prior to patient discharging to [place].       Cele Avila RN BSN   Transitional Care Coordinator

## 2024-11-05 NOTE — PROGRESS NOTES
[facility]  TRAUMA SERVICE - PROGRESS NOTE    Patient Name: Tommy Richmond  MRN: [id number]  Admit Date: 1031  : 1935  AGE: 89 y.o.   GENDER: male  8027/8027-A  ==============================================================================  MECHANISM OF INJURY:   89-year-old male who presented after a ground-level fall of unknown mechanism. Due to patient's confusion, history obtained via chart review and conversation w/ patient's son.   LOC (yes/no?): No  Anticoagulant / Anti-platelet Rx? (for what dx?): No  Referring Facility Name (N/A for scene EMR run): [facility]     No acute overnight events     INJURIES:   Left Hemo/Pneumothorax  L1 Compression fracture  Fracture of left ribs 5-7     OTHER MEDICAL PROBLEMS:  Recurrent UTIs with stage III Kidney disease  HTN  Glaucoma  BPH     INCIDENTAL FINDINGS:  Complex kidney cyst  Cholelithiasis    ==============================================================================  TODAY'S ASSESSMENT AND PLAN OF CARE:  89 y/o male s/p fall with L rib fractures and associated hemopneumothorax, now on the floor from the TICU after pain and O2 requirements controlled. Chest tube replaced overnight as imaging noted pigtail inappropriately placed in the abdomen. Patient's abdominal exam is benign this AM. Delirious this AM after a poor night of sleep, patient pulled his own chest pigtail partway out (pulled completely out by M.D. this AM, will obtain post-removal CXR).     - Appreciate input from geriatrics team, especially given patient's current delirium. Will start 25mg seroquel nightly.   - Anticipate discharge to SNF after chest tube is removed; appreciate input from PT/OT teams.  - Continue ceftriaxone for UTI; patient will discharge on cefixime.   - Okay for regular diet; bowel regimen w/ ducolax, miralax, as patient has not had regular bowel movements in this hospital thus far. Given delirium, patient will need assistance  "ordering food.    Plans discussed with Dr. Bergman, attending    Roger Gaytan MD  PGY4 General Surgery  Trauma Surgery [id number]    ==============================================================================  CHIEF COMPLAINT / OVERNIGHT EVENTS:   Patient agitated overnight, given zyprexa. Likely delirium; chest tube to be removed today as patient too acutely delirious to allow CT removal.     MEDICAL HISTORY / ROS:  Admission history and ROS reviewed. Pertinent changes as follows:    PHYSICAL EXAM:  Heart Rate:  [76-94]   Temp:  [35.9 °C (96.6 °F)-37 °C (98.6 °F)]   Resp:  [17-18]   BP: (116-179)/(60-79)   SpO2:  [92 %-95 %]   Physical Exam  Constitutional:       General: He is awake. He is not in acute distress.     Comments: Patient lying comfortably in bed, oriented to self/ time only. Irritated and grumpy, accusing those around of \"lying\" and asking about the fairgrounds. Amnestic to most events of the last week.   HENT:      Head: Normocephalic and atraumatic.      Right Ear: External ear normal.      Left Ear: External ear normal.      Nose: Nose normal.      Mouth/Throat:      Mouth: Mucous membranes are moist.   Eyes:      Extraocular Movements: Extraocular movements intact.      Conjunctiva/sclera: Conjunctivae normal.      Pupils: Pupils are equal, round, and reactive to light.   Cardiovascular:      Rate and Rhythm: Normal rate and regular rhythm.      Pulses: Normal pulses.           Radial pulses are 2+ on the right side and 2+ on the left side.        Dorsalis pedis pulses are 2+ on the right side and 2+ on the left side.      Heart sounds: Normal heart sounds, S1 normal and S2 normal. Heart sounds not distant. No murmur heard.  Pulmonary:      Effort: Pulmonary effort is normal.      Breath sounds: Normal breath sounds.      Comments: Equal chest rise and fall.No respiratory distress.   Chest:      Comments: There is left lower chest wall ecchymosis with tenderness. Chest tube apparently " dislodged.   Abdominal:      General: Abdomen is flat. There is no distension.      Palpations: Abdomen is soft.      Tenderness: There is no abdominal tenderness.      Comments: There is Left quadrant ecchymosis extending to the midline abdomen.    Genitourinary:     Penis: Normal.    Musculoskeletal:         General: Normal range of motion.      Cervical back: Full passive range of motion without pain and normal range of motion. No tenderness.      Right lower leg: No edema.      Left lower leg: No edema.   Skin:     General: Skin is warm.   Neurological:      General: No focal deficit present.      Mental Status: He is alert. He is disoriented.      Cranial Nerves: No cranial nerve deficit.      Sensory: No sensory deficit.      Motor: No weakness.   Psychiatric:         Mood and Affect: Mood normal.         Behavior: Behavior is cooperative.    IMAGING SUMMARY:  (summary of new imaging findings, not a copy of dictation)  CXR with improved aeration and no pneumothorax    LABS:  Results from last 7 days   Lab Units 11/04/24  0654 11/03/24  0901 11/02/24  1233 10/31/24  1615 10/31/24  0824   WBC AUTO x10*3/uL 6.9 7.2 7.6   < > 10.2   HEMOGLOBIN g/dL 8.5* 8.4* 9.0*   < > 7.6*   HEMATOCRIT % 26.9* 26.5* 29.0*   < > 22.1*   PLATELETS AUTO x10*3/uL 230 237 234   < > 192   NEUTROS PCT AUTO %  --   --   --   --  93.2   LYMPHS PCT AUTO %  --   --   --   --  2.2   MONOS PCT AUTO %  --   --   --   --  2.9   EOS PCT AUTO %  --   --   --   --  0.1    < > = values in this interval not displayed.         Results from last 7 days   Lab Units 11/04/24  0654 11/03/24  0901 11/02/24  1233 10/31/24  1417 10/30/24  1909   SODIUM mmol/L 141 140 140   < > 140   POTASSIUM mmol/L 3.9 4.1 3.9   < > 4.5   CHLORIDE mmol/L 109* 106 109*   < > 110*   CO2 mmol/L 25 28 24   < > 23   BUN mg/dL 33* 43* 37*   < > 73*   CREATININE mg/dL 1.05 1.67* 1.11   < > 1.87*   CALCIUM mg/dL 7.5* 7.8* 8.0*   < > 8.6   PROTEIN TOTAL g/dL  --   --   --   --   5.6*   BILIRUBIN TOTAL mg/dL  --   --   --   --  1.1   ALK PHOS U/L  --   --   --   --  87   ALT U/L  --   --   --   --  27   AST U/L  --   --   --   --  29   GLUCOSE mg/dL 85 101* 131*   < > 87    < > = values in this interval not displayed.     Results from last 7 days   Lab Units 10/30/24  3409   BILIRUBIN TOTAL mg/dL 1.1           I have reviewed all medications, laboratory results, and imaging pertinent for today's encounter.

## 2024-11-06 ENCOUNTER — TELEPHONE (OUTPATIENT)
Dept: PRIMARY CARE | Facility: CLINIC | Age: 89
End: 2024-11-06
Payer: MEDICARE

## 2024-11-06 ENCOUNTER — APPOINTMENT (OUTPATIENT)
Dept: CARDIOLOGY | Facility: HOSPITAL | Age: 89
DRG: 183 | End: 2024-11-06
Payer: MEDICARE

## 2024-11-06 ENCOUNTER — APPOINTMENT (OUTPATIENT)
Dept: RADIOLOGY | Facility: HOSPITAL | Age: 89
DRG: 183 | End: 2024-11-06
Payer: MEDICARE

## 2024-11-06 LAB
25(OH)D3 SERPL-MCNC: 41 NG/ML (ref 30–100)
VIT B12 SERPL-MCNC: 755 PG/ML (ref 211–911)

## 2024-11-06 PROCEDURE — 93005 ELECTROCARDIOGRAM TRACING: CPT

## 2024-11-06 PROCEDURE — 1100000001 HC PRIVATE ROOM DAILY

## 2024-11-06 PROCEDURE — 99233 SBSQ HOSP IP/OBS HIGH 50: CPT

## 2024-11-06 PROCEDURE — 36415 COLL VENOUS BLD VENIPUNCTURE: CPT

## 2024-11-06 PROCEDURE — 2500000001 HC RX 250 WO HCPCS SELF ADMINISTERED DRUGS (ALT 637 FOR MEDICARE OP)

## 2024-11-06 PROCEDURE — 82607 VITAMIN B-12: CPT

## 2024-11-06 PROCEDURE — 97530 THERAPEUTIC ACTIVITIES: CPT | Mod: GP

## 2024-11-06 PROCEDURE — 2500000004 HC RX 250 GENERAL PHARMACY W/ HCPCS (ALT 636 FOR OP/ED): Performed by: STUDENT IN AN ORGANIZED HEALTH CARE EDUCATION/TRAINING PROGRAM

## 2024-11-06 PROCEDURE — 2500000002 HC RX 250 W HCPCS SELF ADMINISTERED DRUGS (ALT 637 FOR MEDICARE OP, ALT 636 FOR OP/ED)

## 2024-11-06 PROCEDURE — 2500000005 HC RX 250 GENERAL PHARMACY W/O HCPCS

## 2024-11-06 PROCEDURE — 2500000004 HC RX 250 GENERAL PHARMACY W/ HCPCS (ALT 636 FOR OP/ED)

## 2024-11-06 PROCEDURE — 99232 SBSQ HOSP IP/OBS MODERATE 35: CPT | Performed by: STUDENT IN AN ORGANIZED HEALTH CARE EDUCATION/TRAINING PROGRAM

## 2024-11-06 PROCEDURE — 71045 X-RAY EXAM CHEST 1 VIEW: CPT | Performed by: RADIOLOGY

## 2024-11-06 PROCEDURE — 71045 X-RAY EXAM CHEST 1 VIEW: CPT

## 2024-11-06 RX ORDER — NALOXONE HYDROCHLORIDE 0.4 MG/ML
0.2 INJECTION, SOLUTION INTRAMUSCULAR; INTRAVENOUS; SUBCUTANEOUS EVERY 5 MIN PRN
Status: DISCONTINUED | OUTPATIENT
Start: 2024-11-06 | End: 2024-11-12 | Stop reason: HOSPADM

## 2024-11-06 RX ORDER — QUETIAPINE FUMARATE 25 MG/1
25 TABLET, FILM COATED ORAL EVERY MORNING
Status: DISCONTINUED | OUTPATIENT
Start: 2024-11-07 | End: 2024-11-07

## 2024-11-06 RX ORDER — MAGNESIUM SULFATE HEPTAHYDRATE 40 MG/ML
2 INJECTION, SOLUTION INTRAVENOUS ONCE
Status: COMPLETED | OUTPATIENT
Start: 2024-11-06 | End: 2024-11-06

## 2024-11-06 RX ORDER — OXYCODONE HYDROCHLORIDE 5 MG/1
5 TABLET ORAL NIGHTLY
Status: DISCONTINUED | OUTPATIENT
Start: 2024-11-06 | End: 2024-11-12

## 2024-11-06 RX ORDER — OLANZAPINE 10 MG/2ML
2.5 INJECTION, POWDER, FOR SOLUTION INTRAMUSCULAR ONCE
Status: COMPLETED | OUTPATIENT
Start: 2024-11-06 | End: 2024-11-06

## 2024-11-06 RX ORDER — ACETAMINOPHEN 325 MG/1
975 TABLET ORAL EVERY 8 HOURS
Status: DISCONTINUED | OUTPATIENT
Start: 2024-11-06 | End: 2024-11-12 | Stop reason: HOSPADM

## 2024-11-06 RX ORDER — QUETIAPINE FUMARATE 50 MG/1
50 TABLET, FILM COATED ORAL NIGHTLY
Status: DISCONTINUED | OUTPATIENT
Start: 2024-11-06 | End: 2024-11-09

## 2024-11-06 RX ORDER — QUETIAPINE FUMARATE 25 MG/1
25 TABLET, FILM COATED ORAL EVERY 8 HOURS
Status: DISCONTINUED | OUTPATIENT
Start: 2024-11-07 | End: 2024-11-06

## 2024-11-06 RX ORDER — QUETIAPINE FUMARATE 25 MG/1
25 TABLET, FILM COATED ORAL EVERY MORNING
Status: DISCONTINUED | OUTPATIENT
Start: 2024-11-07 | End: 2024-11-06

## 2024-11-06 RX ORDER — POTASSIUM CHLORIDE 20 MEQ/1
10 TABLET, EXTENDED RELEASE ORAL ONCE
Status: DISCONTINUED | OUTPATIENT
Start: 2024-11-06 | End: 2024-11-10

## 2024-11-06 RX ADMIN — SENNOSIDES 8.6 MG: 8.6 TABLET, FILM COATED ORAL at 20:31

## 2024-11-06 RX ADMIN — Medication 9 MG: at 20:31

## 2024-11-06 RX ADMIN — OXYCODONE HYDROCHLORIDE 5 MG: 5 TABLET ORAL at 20:32

## 2024-11-06 RX ADMIN — METOPROLOL TARTRATE 12.5 MG: 25 TABLET, FILM COATED ORAL at 20:31

## 2024-11-06 RX ADMIN — OXYCODONE HYDROCHLORIDE 5 MG: 5 TABLET ORAL at 13:10

## 2024-11-06 RX ADMIN — CEFTRIAXONE SODIUM 1 G: 1 INJECTION, SOLUTION INTRAVENOUS at 17:13

## 2024-11-06 RX ADMIN — TAMSULOSIN HYDROCHLORIDE 0.4 MG: 0.4 CAPSULE ORAL at 20:33

## 2024-11-06 RX ADMIN — MAGNESIUM SULFATE HEPTAHYDRATE 2 G: 40 INJECTION, SOLUTION INTRAVENOUS at 12:44

## 2024-11-06 RX ADMIN — QUETIAPINE FUMARATE 50 MG: 50 TABLET ORAL at 20:32

## 2024-11-06 RX ADMIN — OLANZAPINE 2.5 MG: 10 INJECTION, POWDER, LYOPHILIZED, FOR SOLUTION INTRAMUSCULAR at 01:15

## 2024-11-06 RX ADMIN — ENOXAPARIN SODIUM 30 MG: 30 INJECTION SUBCUTANEOUS at 20:32

## 2024-11-06 RX ADMIN — ENOXAPARIN SODIUM 30 MG: 30 INJECTION SUBCUTANEOUS at 08:26

## 2024-11-06 ASSESSMENT — COGNITIVE AND FUNCTIONAL STATUS - GENERAL
HELP NEEDED FOR BATHING: A LOT
CLIMB 3 TO 5 STEPS WITH RAILING: A LOT
MOVING TO AND FROM BED TO CHAIR: A LOT
MOVING TO AND FROM BED TO CHAIR: TOTAL
CLIMB 3 TO 5 STEPS WITH RAILING: TOTAL
WALKING IN HOSPITAL ROOM: A LOT
EATING MEALS: A LITTLE
EATING MEALS: A LITTLE
TOILETING: A LOT
TURNING FROM BACK TO SIDE WHILE IN FLAT BAD: A LOT
DRESSING REGULAR LOWER BODY CLOTHING: A LOT
TURNING FROM BACK TO SIDE WHILE IN FLAT BAD: A LOT
MOBILITY SCORE: 12
MOBILITY SCORE: 8
MOVING FROM LYING ON BACK TO SITTING ON SIDE OF FLAT BED WITH BEDRAILS: A LOT
DRESSING REGULAR UPPER BODY CLOTHING: A LITTLE
DRESSING REGULAR LOWER BODY CLOTHING: A LOT
DRESSING REGULAR UPPER BODY CLOTHING: A LITTLE
HELP NEEDED FOR BATHING: A LITTLE
TOILETING: A LOT
WALKING IN HOSPITAL ROOM: A LOT
DAILY ACTIVITIY SCORE: 15
MOBILITY SCORE: 12
TURNING FROM BACK TO SIDE WHILE IN FLAT BAD: A LOT
CLIMB 3 TO 5 STEPS WITH RAILING: TOTAL
MOVING TO AND FROM BED TO CHAIR: A LOT
DAILY ACTIVITIY SCORE: 15
MOVING FROM LYING ON BACK TO SITTING ON SIDE OF FLAT BED WITH BEDRAILS: A LITTLE
WALKING IN HOSPITAL ROOM: TOTAL
PERSONAL GROOMING: A LOT
STANDING UP FROM CHAIR USING ARMS: TOTAL
STANDING UP FROM CHAIR USING ARMS: A LOT
MOVING FROM LYING ON BACK TO SITTING ON SIDE OF FLAT BED WITH BEDRAILS: A LOT
PERSONAL GROOMING: A LITTLE
STANDING UP FROM CHAIR USING ARMS: A LOT

## 2024-11-06 ASSESSMENT — PAIN SCALES - WONG BAKER
WONGBAKER_NUMERICALRESPONSE: NO HURT
WONGBAKER_NUMERICALRESPONSE: HURTS LITTLE BIT

## 2024-11-06 ASSESSMENT — LIFESTYLE VARIABLES
HOW OFTEN DO YOU HAVE 6 OR MORE DRINKS ON ONE OCCASION: PATIENT UNABLE TO ANSWER
AUDIT-C TOTAL SCORE: -1
SKIP TO QUESTIONS 9-10: 0
AUDIT-C TOTAL SCORE: -1
HOW OFTEN DO YOU HAVE A DRINK CONTAINING ALCOHOL: PATIENT UNABLE TO ANSWER
HOW MANY STANDARD DRINKS CONTAINING ALCOHOL DO YOU HAVE ON A TYPICAL DAY: PATIENT UNABLE TO ANSWER

## 2024-11-06 ASSESSMENT — PAIN - FUNCTIONAL ASSESSMENT
PAIN_FUNCTIONAL_ASSESSMENT: 0-10
PAIN_FUNCTIONAL_ASSESSMENT: 0-10

## 2024-11-06 ASSESSMENT — PAIN SCALES - GENERAL
PAINLEVEL_OUTOF10: 0 - NO PAIN
PAINLEVEL_OUTOF10: 7

## 2024-11-06 ASSESSMENT — PAIN DESCRIPTION - LOCATION: LOCATION: LEG

## 2024-11-06 NOTE — PROGRESS NOTES
Physical Therapy    Physical Therapy Treatment    Patient Name: Tommy Richmond  MRN: [id number]  Department: [place] [address]  Room: [facility]  Today's Date: 11/6/2024  Time Calculation  Start Time: 1417  Stop Time: 1435  Time Calculation (min): 18 min         Assessment/Plan   PT Assessment  End of Session Communication: Bedside nurse  Assessment Comment: Pt tolerated session fairly.  Pt with fear of falling throughout session, frantically kicking LEs making sitting EOB unsafe despite Max A due to pt's hips sliding forward at EOB.  Pt completed some ther ex but again with fear of falling despite being supine in bed with bed rails up.  Pt continues to benefit from skilled PT and remains appropriate for moderate intensity PT upon discharge.  End of Session Patient Position: Bed, 3 rail up, Alarm on (RN in room)  PT Plan  Inpatient/Swing Bed or Outpatient: Inpatient  PT Plan  Treatment/Interventions: Bed mobility, Transfer training, Gait training, Stair training, Balance training, Neuromuscular re-education, Strengthening, Endurance training, Therapeutic exercise, Therapeutic activity, Home exercise program, Postural re-education  PT Plan: Ongoing PT  PT Frequency: 5 times per week  PT Discharge Recommendations: Moderate intensity level of continued care  Equipment Recommended upon Discharge:  (TBD at next level of care)  PT Recommended Transfer Status: Assist x2  PT - OK to Discharge: Yes (eval complete and discharge recommendations made)      General Visit Information:   PT  Visit  PT Received On: 11/06/24  General  Reason for Referral: GLF: 1. Left Hemo/Pneumothorax  2. L1 Compression fracture  3. Fracture of left ribs 5-7  Past Medical History Relevant to Rehab: 1. Recurrent UTIs with stage III Kidney disease  2. HTN  3. Glaucoma  4. BPH  Missed Visit: Yes  Missed Visit Reason: Patient refused (PT will re-attempt as schedule allows)  Co-Treatment:  (rehab aide assisted with bed mobility)  Prior to Session Communication:  Bedside nurse  Patient Position Received: Bed, 3 rail up, Alarm on  General Comment: Pt cleared for PT by RN.  Pt alert and agreeable to PT. +PIVx2, external catheter    Subjective   Precautions:  Precautions  Medical Precautions: Fall precautions, Spinal precautions    Objective   Pain:  Pain Assessment  Pain Assessment: 0-10  0-10 (Numeric) Pain Score:  (initially pt denied pain, then c/o neck pain with bed mobility and buttock pain with supine ther ex.  Did not rate either pain)  Pain Interventions: Repositioned, Rest  Response to Interventions: no change in pain  Cognition:  Cognition  Overall Cognitive Status: Impaired  Orientation Level: Disoriented to situation, Disoriented to time  Coordination:  Movements are Fluid and Coordinated: No  Postural Control:  Postural Control  Postural Control: Impaired (Max A for sitting balance)  Static Sitting Balance  Static Sitting-Balance Support: Bilateral upper extremity supported, Feet supported  Static Sitting-Level of Assistance: Maximum assistance  Dynamic Sitting Balance  Dynamic Sitting-Balance Support: Bilateral upper extremity supported, Feet supported  Dynamic Sitting-Level of Assistance: Maximum assistance  Dynamic Sitting-Balance:  (LE ther ex)  Activity Tolerance:  Activity Tolerance  Endurance: Tolerates 10 - 20 min exercise with multiple rests  Treatments:  Therapeutic Exercise  Therapeutic Exercise Performed: Yes  Therapeutic Exercise Activity 1: AAROM LAQx4 on L LE with pt sliding forward at EOB twice due to pt frantically kicking LEs despite repositioning and reoerientation to task and situation, so discontinued.  AAROM ankle pumps, heel slides x10 each LE.  Attempted quad sets with pt again frantically kicking LEs, unable to complete quad sets so discontinued    Therapeutic Activity  Therapeutic Activity Performed: Yes  Therapeutic Activity 1: bed mobility, pt edu on PT POC, ther ex, bed mobility, and sitting balance <3 minutes    Bed Mobility  Bed  Mobility: Yes  Bed Mobility 1  Bed Mobility 1: Rolling right, Rolling left  Level of Assistance 1: Maximum assistance  Bed Mobility 2  Bed Mobility  2: Supine to sitting, Sitting to supine  Level of Assistance 2: Maximum assistance, +2  Bed Mobility 3  Bed Mobility 3: Scooting (to HOB in supine)  Level of Assistance 3: Dependent, +2       Transfers  Transfer: No (deferred due to pt fear of falling and frantically kicking LEs at EOB making seated ther ex unsafe)    Outcome Measures:  [facility] Basic Mobility  Turning from your back to your side while in a flat bed without using bedrails: A lot  Moving from lying on your back to sitting on the side of a flat bed without using bedrails: A lot  Moving to and from bed to chair (including a wheelchair): Total  Standing up from a chair using your arms (e.g. wheelchair or bedside chair): Total  To walk in hospital room: Total  Climbing 3-5 steps with railing: Total  Basic Mobility - Total Score: 8    Education Documentation  Body Mechanics, taught by Karyna Gold PT at 11/6/2024  2:48 PM.  Learner: Patient  Readiness: Acceptance  Method: Explanation  Response: Needs Reinforcement  Comment: Spine precautions    Precautions, taught by Karyan Gold, PT at 11/6/2024  2:48 PM.  Learner: Patient  Readiness: Acceptance  Method: Explanation  Response: Needs Reinforcement  Comment: Spine precautions    Home Exercise Program, taught by Karyna Gold, PT at 11/6/2024  2:48 PM.  Learner: Patient  Readiness: Acceptance  Method: Explanation  Response: Needs Reinforcement  Comment: Spine precautions    Mobility Training, taught by Karyna Gold PT at 11/6/2024  2:48 PM.  Learner: Patient  Readiness: Acceptance  Method: Explanation  Response: Needs Reinforcement  Comment: Spine precautions    Education Comments  No comments found.        Encounter Problems       Encounter Problems (Active)       Balance       STG - Maintains dynamic standing balance with upper extremity support on LRAD  with Min A x1  (Not Progressing)       Start:  11/01/24    Expected End:  11/22/24            STG - Maintains dynamic sitting balance without upper extremity support with Sup (Progressing)       Start:  11/01/24    Expected End:  11/22/24               Mobility       STG - Patient will ambulate x20 ft with LRAD with Min A x1 (Not Progressing)       Start:  11/01/24    Expected End:  11/22/24               PT Transfers       STG - Patient will perform bed mobility with Min A (Progressing)       Start:  11/01/24    Expected End:  11/22/24            STG - Patient will transfer sit to and from stand with Min A using LRAD (Not Progressing)       Start:  11/01/24    Expected End:  11/22/24

## 2024-11-06 NOTE — CARE PLAN
The patient's goals for the shift include      The clinical goals for the shift include pt will remain injury free

## 2024-11-06 NOTE — PROGRESS NOTES
"Patient not medically ready per team. Rev Michael Ireland at [facility] [place] needs a \"referral bundle\" from  sent over. Phone [phone]. Cell [phone]. Fax [phone]. Sent via fax. Patient's son would like home care set up for patient for patient to discharge to home 1-2 days prior to admitting to AL facility. Liz Valero RN, TCC    "

## 2024-11-06 NOTE — PROGRESS NOTES
[facility]  TRAUMA SERVICE - PROGRESS NOTE    Patient Name: Tommy Richmond  MRN: [id number]  Admit Date: 1031  : 1935  AGE: 89 y.o.   GENDER: male  8027/8027-A  ==============================================================================  MECHANISM OF INJURY:   89-year-old male who presented after a ground-level fall of unknown mechanism. Due to patient's confusion, history obtained via chart review and conversation w/ patient's son.   LOC (yes/no?): No  Anticoagulant / Anti-platelet Rx? (for what dx?): No  Referring Facility Name (N/A for scene EMR run): [facility]       INJURIES:   Left Hemo/Pneumothorax  L1 Compression fracture  Fracture of left ribs 5-7     OTHER MEDICAL PROBLEMS:  Recurrent UTIs with stage III Kidney disease  HTN  Glaucoma  BPH     INCIDENTAL FINDINGS:  Complex kidney cyst  Cholelithiasis    ==============================================================================  TODAY'S ASSESSMENT AND PLAN OF CARE:  89 y/o male s/p fall with L rib fractures and associated hemopneumothorax, now on the floor from the TICU after pain and O2 requirements controlled. Chest tube replaced overnight as imaging noted pigtail inappropriately placed in the abdomen. Patient's abdominal exam is benign this AM. Patient continues to be intermittently delirious with a benign abdominal exam.    - Appreciate input from geriatrics team, especially given patient's current delirium. Will schedule patient's nightly oxycodone given that some amount of patient's delirium may be driven by pan.   - Increasing nightly seroquel to 50mg, adding 25mg in the morning due to ongoing morning delirium.  - Anticipate discharge to SNF now that chest tube is removed; appreciate input from PT/OT teams.  - Continue ceftriaxone for UTI; patient will discharge on cefixime.   - Okay for regular diet; bowel regimen w/ ducolax, miralax, as patient has not had regular bowel movements in this hospital  thus far. Given delirium, patient will need assistance ordering food.    Plans discussed with Dr. Bergman, attending    Roger Gaytan MD  PGY4 General Surgery  Trauma Surgery [id number]    ==============================================================================  CHIEF COMPLAINT / OVERNIGHT EVENTS:   Patient agitated overnight, given zyprexa. Likely delirium; chest tube to be removed today as patient too acutely delirious to allow CT removal.     MEDICAL HISTORY / ROS:  Admission history and ROS reviewed. Pertinent changes as follows:    PHYSICAL EXAM:  Heart Rate:  [77-94]   Temp:  [36.1 °C (97 °F)-36.6 °C (97.9 °F)]   Resp:  [16-18]   BP: (120-205)/(65-88)   SpO2:  [91 %-93 %]   Physical Exam  Constitutional:       General: He is awake. He is not in acute distress.     Comments: Patient lying comfortably in bed, oriented to self/ time only. Irritated and grumpy, does not remember he has children. Accuses the examiner of lying. Amnestic to most events of the last week. Not easily redirectable.   HENT:      Head: Normocephalic and atraumatic.      Right Ear: External ear normal.      Left Ear: External ear normal.      Nose: Nose normal.      Mouth/Throat:      Mouth: Mucous membranes are moist.   Eyes:      Extraocular Movements: Extraocular movements intact.      Conjunctiva/sclera: Conjunctivae normal.      Pupils: Pupils are equal, round, and reactive to light.   Cardiovascular:      Rate and Rhythm: Normal rate and regular rhythm.      Pulses: Normal pulses.           Radial pulses are 2+ on the right side and 2+ on the left side.        Dorsalis pedis pulses are 2+ on the right side and 2+ on the left side.      Heart sounds: Normal heart sounds, S1 normal and S2 normal. Heart sounds not distant. No murmur heard.  Pulmonary:      Effort: Pulmonary effort is normal.      Breath sounds: Normal breath sounds.      Comments: Equal chest rise and fall. No respiratory distress.   Chest:      Comments: There  is left lower chest wall ecchymosis with tenderness. Chest tube dressing in place.   Abdominal:      General: Abdomen is flat. There is no distension.      Palpations: Abdomen is soft.      Tenderness: There is no abdominal tenderness.      Comments: There is Left quadrant ecchymosis extending to the midline abdomen.    Genitourinary:     Penis: Normal.    Musculoskeletal:         General: Normal range of motion.      Cervical back: Full passive range of motion without pain and normal range of motion. No tenderness.      Right lower leg: No edema.      Left lower leg: No edema.   Skin:     General: Skin is warm.   Neurological:      General: No focal deficit present.      Mental Status: He is alert. He is disoriented.      Cranial Nerves: No cranial nerve deficit.      Sensory: No sensory deficit.      Motor: No weakness.   Psychiatric:         Mood and Affect: Mood is agitated.         Behavior: Behavior is uncooperative.    IMAGING SUMMARY:  (summary of new imaging findings, not a copy of dictation)  CXR with improved aeration and no pneumothorax    LABS:  Results from last 7 days   Lab Units 11/04/24  0654 11/03/24  0901 11/02/24  1233 10/31/24  1615 10/31/24  0824   WBC AUTO x10*3/uL 6.9 7.2 7.6   < > 10.2   HEMOGLOBIN g/dL 8.5* 8.4* 9.0*   < > 7.6*   HEMATOCRIT % 26.9* 26.5* 29.0*   < > 22.1*   PLATELETS AUTO x10*3/uL 230 237 234   < > 192   NEUTROS PCT AUTO %  --   --   --   --  93.2   LYMPHS PCT AUTO %  --   --   --   --  2.2   MONOS PCT AUTO %  --   --   --   --  2.9   EOS PCT AUTO %  --   --   --   --  0.1    < > = values in this interval not displayed.         Results from last 7 days   Lab Units 11/04/24  0654 11/03/24  0901 11/02/24  1233 10/31/24  1417 10/30/24  1909   SODIUM mmol/L 141 140 140   < > 140   POTASSIUM mmol/L 3.9 4.1 3.9   < > 4.5   CHLORIDE mmol/L 109* 106 109*   < > 110*   CO2 mmol/L 25 28 24   < > 23   BUN mg/dL 33* 43* 37*   < > 73*   CREATININE mg/dL 1.05 1.67* 1.11   < > 1.87*    CALCIUM mg/dL 7.5* 7.8* 8.0*   < > 8.6   PROTEIN TOTAL g/dL  --   --   --   --  5.6*   BILIRUBIN TOTAL mg/dL  --   --   --   --  1.1   ALK PHOS U/L  --   --   --   --  87   ALT U/L  --   --   --   --  27   AST U/L  --   --   --   --  29   GLUCOSE mg/dL 85 101* 131*   < > 87    < > = values in this interval not displayed.     Results from last 7 days   Lab Units 10/30/24  1909   BILIRUBIN TOTAL mg/dL 1.1           I have reviewed all medications, laboratory results, and imaging pertinent for today's encounter.

## 2024-11-06 NOTE — PROGRESS NOTES
Subjective   Pt had chest tube removed yesterday. Per nursing, not oriented to self this am and only to birthday. Overnight, had an episode of yelling but was otherwise ok. Seroquel stated yesterday.  Received 2.5 mg Zyprexa around 1 am.     Objective     Current Facility-Administered Medications   Medication Dose Route Frequency Provider Last Rate Last Admin    acetaminophen (Tylenol) tablet 650 mg  650 mg oral q6h Melba Castillo MD   650 mg at 11/05/24 2117    bisacodyl (Dulcolax) suppository 10 mg  10 mg rectal Daily Roger Gaytan MD   10 mg at 11/03/24 1047    cefTRIAXone (Rocephin) 1 g in dextrose (iso) IV 50 mL  1 g intravenous q24h Roger Gaytan MD   Stopped at 11/05/24 1723    cyanocobalamin (Vitamin B-12) tablet 1,000 mcg  1,000 mcg oral Daily Roger Gaytan MD   1,000 mcg at 11/04/24 1457    enoxaparin (Lovenox) syringe 30 mg  30 mg subcutaneous BID Melba Castillo MD   30 mg at 11/06/24 0826    [Held by provider] furosemide (Lasix) tablet 20 mg  20 mg oral Daily Roger Gaytan MD        HYDROmorphone PF (Dilaudid) injection 0.2 mg  0.2 mg intravenous q3h PRN Melba Castillo MD   0.2 mg at 11/03/24 1210    lidocaine 4 % patch 1 patch  1 patch transdermal Daily Melba Castillo MD   1 patch at 11/03/24 0919    melatonin tablet 9 mg  9 mg oral Nightly Roger Gaytan MD   9 mg at 11/05/24 2117    methocarbamol (Robaxin) tablet 500 mg  500 mg oral q8h PRN Roger Gaytan MD   500 mg at 11/02/24 1620    metoprolol tartrate (Lopressor) tablet 12.5 mg  12.5 mg oral BID Albino León MD   12.5 mg at 11/05/24 2117    oxyCODONE (Roxicodone) immediate release tablet 2.5 mg  2.5 mg oral q4h PRN Roger Gaytan MD        oxyCODONE (Roxicodone) immediate release tablet 5 mg  5 mg oral q4h PRN Roger Gaytan MD   5 mg at 11/04/24 0415    oxygen (O2) therapy   inhalation Continuous PRN - O2/gases Edmundo Villa DO   2 L/min at 11/01/24 0800    polyethylene glycol  (Glycolax, Miralax) packet 17 g  17 g oral Daily Edmundo Villa, DO   17 g at 11/03/24 0919    sennosides (Senokot) tablet 8.6 mg  1 tablet oral Nightly Roger Gaytan MD        tamsulosin (Flomax) 24 hr capsule 0.4 mg  0.4 mg oral Nightly Roger Gaytan MD   0.4 mg at 11/05/24 2118       Physical Exam  HENT:      Head: Normocephalic.      Nose: Nose normal.      Mouth/Throat:      Mouth: Mucous membranes are dry.   Eyes:      Extraocular Movements: Extraocular movements intact.      Conjunctiva/sclera: Conjunctivae normal.   Cardiovascular:      Rate and Rhythm: Normal rate and regular rhythm.   Abdominal:      General: There is no distension.      Palpations: Abdomen is soft.      Comments: Purpura over left chest and abdomen   Genitourinary:     Comments: Dark urine from condom catheter  Musculoskeletal:         General: No swelling or deformity.   Skin:     General: Skin is warm and dry.      Capillary Refill: Capillary refill takes less than 2 seconds.   Neurological:      Comments: Not oriented to self, place, or time. Able to state birthday. Moving all extremities.            Confusion Assessment Method(CAM) for diagnosis of delirium:    1.  Acute onset or fluctuating course: absent/present: Present  2.  Inattention: absent/present: Present  3.  Disorganized thinking: absent/present: Present  4.  Altered level of consciousness: absent/present: Absent  CAM: positive    AT Score For Assessment of Delirium and Cognitive Impairment:    Alertness: 0  Normal(fully alert,but not agitated, throughout assessment)=0  Mild sleepiness for <10 seconds after walking, then normal=0  Clearly abnormal=4  2.  AMT4: 1  No mistakes=0  One mistake=1  Two or more mistakes/untestable=2  3.  Attention: 1  Achieves seven months or more correctly=0  Starts but scores <7 months/ refuses to start=1  Untestable(cannot start because unwell, drowsy, inattentive)=2  4.  Acute: 4  No=0  Yes=4    Total Score: 6  4 or above: Possible  delirium +/- cognitive impairment  1-3: Possible cognitive impairment  0: Delirium or severe cognitive impairment unlikely(but delirium still possible if (4) information incomplete)      Last Recorded Vitals      11/5/2024     8:23 AM 11/5/2024    12:42 PM 11/5/2024     4:27 PM 11/5/2024     6:42 PM 11/5/2024     8:44 PM 11/5/2024    11:00 PM 11/6/2024     7:55 AM   Vitals   Systolic 179 165 205 174 165 120 164   Diastolic 65 79 65 88 75 70 86   Heart Rate 80 77 89 94 87 80 84   Temp 36.3 °C (97.4 °F) 36.6 °C (97.9 °F) 36.6 °C (97.8 °F)  36.2 °C (97.2 °F) 36.1 °C (97 °F) 36.3 °C (97.3 °F)   Resp 18 18 18  16  17      Vitals:    10/31/24 0554   Weight: 65.8 kg (145 lb)        Relevant Results  Lab Results   Component Value Date    TSH 1.47 09/26/2024    [id number] 236 07/08/2024     Results from last 7 days   Lab Units 11/04/24  0654 11/03/24  0901 11/02/24  1233 10/31/24  0824 10/30/24  1909   WBC AUTO x10*3/uL 6.9 7.2 7.6   < > 7.8   HEMOGLOBIN g/dL 8.5* 8.4* 9.0*   < > 8.2*   HEMATOCRIT % 26.9* 26.5* 29.0*   < > 26.1*   ALT U/L  --   --   --   --  27   AST U/L  --   --   --   --  29   SODIUM mmol/L 141 140 140   < > 140   POTASSIUM mmol/L 3.9 4.1 3.9   < > 4.5   CHLORIDE mmol/L 109* 106 109*   < > 110*   CREATININE mg/dL 1.05 1.67* 1.11   < > 1.87*   BUN mg/dL 33* 43* 37*   < > 73*   CO2 mmol/L 25 28 24   < > 23    < > = values in this interval not displayed.          XR chest 1 view  Narrative: Interpreted By:  Vitor Shields,   STUDY:  XR CHEST 1 VIEW;  11/5/2024 5:24 am      INDICATION:  Signs/Symptoms:Evaluate chest tube status.      COMPARISON:  Chest radiograph dated 11/4/2024      ACCESSION NUMBER(S):  [id number]      ORDERING CLINICIAN:  VITOR FARAH      FINDINGS:  AP radiograph of the chest      The pigtail left thoracostomy tube overlies the subcutaneous tissues  of the left lower thorax. Left lower lobe opacity obscures the left  diaphragm and partially obscures the left ventricular apex.  No  pneumothorax is seen. Pulmonary vascular cephalization redistribution  to lower lobe segments is noted. Focal consolidation/subsegmental  atelectasis right lower lobe medially.      The heart is      Impression: 1. The heart is mildly enlarged. Pulmonary vascular congestion  2. Focal consolidation/subsegmental atelectasis right lower lobe  medially.  3. The left thoracostomy tube has been withdrawn. Subsegment  atelectasis/consolidation/pleural effusion left lower lobe              Signed by: Cezar Shields 11/5/2024 2:40 PM  Dictation workstation:   [id number]  Transthoracic Echo (TTE) [facility], [address] [place] [address]                 Tel [phone] and Fax [phone]    TRANSTHORACIC ECHOCARDIOGRAM REPORT       Patient Name:       LELAND Adame Physician:    [id number] Joel Erazo MD  Study Date:         11/2/2024           Ordering Provider:    [id number] CEZAR FARAH  MRN/PID:            [id number]            Fellow:  Accession#:         [id number]        Nurse:                Arlyn Fall RN  Date of Birth/Age:  1935 / 89      Sonographer:          Gisselle arevalo                                     EDY  Gender assigned at  M                   Additional Staff:  Birth:  Height:             167.64 cm           Admit Date:           10/31/2024  Weight:             65.77 kg            Admission Status:     Inpatient - STAT  BSA / BMI:          1.74 m2 / 23.40     Encounter#:           [id number]                      kg/m2  Blood Pressure:     182/77 mmHg         Department Location:  [facility]                                                                Non Invasive    Study Type:    TRANSTHORACIC ECHO (TTE)  COMPLETE  Diagnosis/ICD: Syncope and collapse-R55  Indication:    Syncope  CPT Code:      Echo Complete w Full Doppler-42995    Patient History:  Pertinent History: HTN; Cholelithiasis; BPH; Syncope; Anemia.    Study Detail: The following Echo studies were performed: 2D, Doppler, M-Mode and                color flow. Technically challenging study due to body habitus and                patient lying in supine position. Definity used as a contrast                agent for endocardial border definition. Total contrast used for                this procedure was 1 mL via IV push.       PHYSICIAN INTERPRETATION:  Left Ventricle: Left ventricular ejection fraction is low normal, calculated by Melendrez's biplane at 51%. There is eccentric left ventricular hypertrophy. The left ventricular cavity size is normal. There is normal septal and normal posterior left ventricular wall thickness. Abnormal (paradoxical) septal motion, consistent with an intraventricular conduction delay. Spectral Doppler shows a Grade I (impaired relaxation pattern) of left ventricular diastolic filling with normal left atrial filling pressure.  Left Atrium: The left atrium is mildly dilated.  Right Ventricle: The right ventricle is normal in size. There is normal right ventricular global systolic function.  Right Atrium: The right atrium is normal in size.  Aortic Valve: The aortic valve is probably trileaflet. There is mild aortic valve cusp calcification. There is trace aortic valve regurgitation. The peak instantaneous gradient of the aortic valve is 7 mmHg.  Mitral Valve: The mitral valve is mildly thickened. There is mild mitral annular calcification. There is mild mitral valve regurgitation.  Tricuspid Valve: The tricuspid valve is structurally normal. There is mild tricuspid regurgitation.  Pulmonic Valve: The pulmonic valve is not well visualized. There is trace pulmonic valve regurgitation.  Pericardium: Trivial pericardial effusion.  Aorta:  The aortic root was not well visualized. There is no dilatation of the ascending aorta. The aortic root is at the upper limits of normal size.  Pulmonary Artery: The tricuspid regurgitant velocity is 2.15 m/s, and with an estimated right atrial pressure of 3 mmHg, the estimated pulmonary artery pressure is normal with the RVSP at 21.5 mmHg.  Systemic Veins: The inferior vena cava was not well visualized.  In comparison to the previous echocardiogram(s): There are no prior studies on this patient for comparison purposes.       CONCLUSIONS:   1. Poorly visualized anatomical structures due to suboptimal image quality.   2. Left ventricular ejection fraction is low normal, calculated by Melendrez's biplane at 51%.   3. There is normal right ventricular global systolic function.   4. The left atrium is mildly dilated.    RECOMMENDATIONS:  Utilizing an FDA cleared automated machine learning algorithm (Workbooks Heart Failure by [employer]), the analysis of the apical 4-chamber echocardiogram suggests the presence of heart failure with preserved ejection fraction (HFpEF)*. Clinical correlation looking for additional heart failure signs and symptoms is recommended, as a definite diagnosis of heart failure cannot be made by imaging alone.  *Per ACC/AHA/HFSA universal diagnosis of heart failure, HFpEF is defined as 1) signs and symptoms leading to clinical diagnosis of heart failure, 2) an ejection fraction of at least 50%, and 3) evidence of elevated intra-cardiac filling pressures by echocardiography, BNP elevation, or catheterization.     QUANTITATIVE DATA SUMMARY:     2D MEASUREMENTS:          Normal Ranges:  IVSd:            0.80 cm  (0.6-1.1cm)  LVPWd:           0.70 cm  (0.6-1.1cm)  LVIDd:           4.80 cm  (3.9-5.9cm)  LVIDs:           3.20 cm  LV Mass Index:   67 g/m2  LVEDV Index:     66 ml/m2  LV % FS          33.3 %       LA VOLUME:                    Normal Ranges:  LA Vol A4C:        62.3 ml    (22+/-6mL/m2)  LA Vol  A2C:        73.4 ml  LA Vol BP:         72.1 ml  LA Vol Index A4C:  35.7ml/m2  LA Vol Index A2C:  42.1 ml/m2  LA Vol Index BP:   41.3 ml/m2  LA Area A4C:       18.5 cm2  LA Area A2C:       21.4 cm2  LA Major Axis A4C: 4.7 cm  LA Major Axis A2C: 5.3 cm       RA VOLUME BY A/L METHOD:          Normal Ranges:  RA Area A4C:             13.9 cm2       M-MODE MEASUREMENTS:         Normal Ranges:  Ao Root:             3.50 cm (2.0-3.7cm)  LAs:                 3.60 cm (2.7-4.0cm)       AORTA MEASUREMENTS:         Normal Ranges:  Ao Sinus, d:        3.62 cm (2.1-3.5cm)  Asc Ao, d:          2.91 cm (2.1-3.4cm)       LV SYSTOLIC FUNCTION BY 2D PLANIMETRY (MOD):                       Normal Ranges:  EF-A4C View:    60 % (>=55%)  EF-A2C View:    50 %  EF-Biplane:     51 %  LV EF Reported: 51 %       LV DIASTOLIC FUNCTION:             Normal Ranges:  MV Peak E:             0.86 m/s    (0.7-1.2 m/s)  MV Peak A:             1.35 m/s    (0.42-0.7 m/s)  E/A Ratio:             0.64        (1.0-2.2)  MV e'                  0.064 m/s   (>8.0)  MV lateral e'          0.08 m/s  MV medial e'           0.05 m/s  MV A Dur:              135.00 msec  E/e' Ratio:            13.51       (<8.0)       MITRAL VALVE:          Normal Ranges:  MV DT:        169 msec (150-240msec)       AORTIC VALVE:           Normal Ranges:  AoV Vmax:      1.29 m/s (<=1.7m/s)  AoV Peak P.7 mmHg (<20mmHg)  LVOT Max Bebo:  1.09 m/s (<=1.1m/s)  LVOT VTI:      23.50 cm  LVOT Diameter: 1.90 cm  (1.8-2.4cm)  AoV Area,Vmax: 2.40 cm2 (2.5-4.5cm2)       RIGHT VENTRICLE:  RV Basal 3.67 cm  RV Mid   2.97 cm  RV Major 7.2 cm  TAPSE:   25.2 mm  RV s'    0.12 m/s       TRICUSPID VALVE/RVSP:          Normal Ranges:  Peak TR Velocity:     2.15 m/s  RV Syst Pressure:     21 mmHg  (< 30mmHg)  IVC Diam:             1.98 cm       PULMONIC VALVE:          Normal Ranges:  PV Accel Time:  108 msec (>120ms)  PV Max Bebo:     0.6 m/s  (0.6-0.9m/s)  PV Max P.5 mmHg       [id number]  Joel Erazo MD  Electronically signed on 11/2/2024 at 9:23:12 AM       ** Final **          DATA:  EKG: QTC  Encounter Date: 10/30/24   ECG 12 lead   Result Value    Ventricular Rate 86    Atrial Rate 86    OH Interval 190    QRS Duration 124    QT Interval 366    QTC Calculation(Bazett) 437    P Axis 24    R Axis -42    T Axis -74    QRS Count 14    Q Onset 225    P Onset 130    P Offset 189    T Offset 408    QTC Fredericia 412    Narrative    Normal sinus rhythm  Left axis deviation  Nonspecific intraventricular conduction delay  T wave abnormality, consider inferior ischemia  Abnormal ECG  When compared with ECG of 14-SEP-2024 17:26,  Nonspecific intraventricular conduction delay has replaced Incomplete right bundle branch block  Borderline criteria for Lateral infarct are no longer Present  See ED provider note for full interpretation and clinical correlation  Confirmed by Lucía Galindo ([id number]) on 11/1/2024 12:54:02 PM      Anti-psychotics in 48 hours: Seroquel, Zyprexa  Opioids/Benzodiazepines in 48 hours: oxycodone 11/4  Anticholinergics on board:No  Restraints:No  Indwelling catheters:No  Last BM:11/4 (pt endorses one yseterday though)  UO in 24 hours: adequate  Need for ambulatory devices: none                  Assessment/Plan   89 y.o. year old male, with past medical history relevant for recurrent UTIs, BPH, colon ca s/p resection, poor vision, gait difficulty, htn, presenting for falls w L multiple rib fx, large posterior chest/abd wall hematoma, L ptx/rosalio, s/p L pigtail CT placement 10/31/24, being seen in geriatric consultation for comanagement, currently being followed for acute delirium. Currently remains altered, responds to name, but unable to state his name, would recommend obtaining labs, optimizing pain regimen, and if no change tomorrow, repeating head imaging.     Principal Problem:    Hemopneumothorax on left    1. Acute mixed (hyperactive/hypoactive) delirium   - CAM positive, 4 AT:  6  - Pain may not be well controlled as opioids currently PRN, has not received any since 11/4. Optimize pain regimen as below with Tylenol and Tramadol.   - Patient is voiding well with regular Bms. Will consider making bisacodyl PRN tomorrow if continues to have bowel movements.   - Continue melatonin 3 mg at 7 pm  - Continue quetiapine 25 mg at 9pm scheduled   - Please obtain EKG to follow EKG after starting Seroquel. Last  on 10/31.   - Please obtain repeat labs including CBC, RFP, Mg, B12, and Vitamin D level. Replete for K < 4 and Mg < 2. Follow up claclium levels and replete if corrected calcium also low (corrected normal from last labs on 11/4). -  - Consider repeat CT head tomorrow if no change  - Keep patient active during the day, which may help patient sleep at night   - UTI may be contributing to delirium     Please consider the following general measures for minimizing delirium in a hospitalized patient:   -Bright lights during the day, keeps blinds up, switch all lights on   -avoid disturbances at night. Encourage at least 6 hours uninterrupted sleep. Consider d/c 4am vitals check  -avoid benzodiazepines, sedatives. Minimize opioids   -daily orientation to time and place by the staff   -out of bed to chair few hours everyday  - encourage stimulating activities during the day if possible     2. Acute UTI   - Afebrile  - Continue Ceftriaxone until 11/13     3. Rib fracture, large abdominal wall hematoma, s/p L CT placement for hemopneumothorax  - Discontinue Robaxin given concern for delirium  - Change Tylenol from 650q6 to 975 TID  - Trial lidocaine patches for pain on chest (can trial two given large area of bruising).  - Start Tramadol 25 mg q6. Please give one dose now. Also schedule 25 mg Tramadol at bedtime. Add Narcan PRN as well.      4M AGE-FRIENDLY INITIATIVE:  What matters most to patient: Not to fall   Medications: oxycodone, methocarbamol, olanzapine   Mentation: CAM positive, 4 AT:  8  Mobility: uses a walker      Geriatric medicine will continue to follow the patient. Thank you for allowing geriatric medicine to be involved in the care of your patient. Geriatric medicine consultation team is available during work hours Monday through Friday. For any emergency issues requiring immediate assistance over the weekend, please page Geriatrics pager [phone].     Pt seen and discussed with Dr. Rajput and recommendations conveyed to primary team.     Mone Crews  Med-Peds PGY4  [facility] Secure Chat

## 2024-11-07 ENCOUNTER — HOME HEALTH ADMISSION (OUTPATIENT)
Dept: HOME HEALTH SERVICES | Facility: HOME HEALTH | Age: 89
End: 2024-11-07
Payer: MEDICARE

## 2024-11-07 ENCOUNTER — APPOINTMENT (OUTPATIENT)
Dept: RADIOLOGY | Facility: HOSPITAL | Age: 89
DRG: 183 | End: 2024-11-07
Payer: MEDICARE

## 2024-11-07 LAB
ALBUMIN SERPL BCP-MCNC: 2.5 G/DL (ref 3.4–5)
ANION GAP SERPL CALC-SCNC: 13 MMOL/L (ref 10–20)
BUN SERPL-MCNC: 28 MG/DL (ref 6–23)
CALCIUM SERPL-MCNC: 7.7 MG/DL (ref 8.6–10.6)
CHLORIDE SERPL-SCNC: 109 MMOL/L (ref 98–107)
CO2 SERPL-SCNC: 25 MMOL/L (ref 21–32)
CREAT SERPL-MCNC: 1.08 MG/DL (ref 0.5–1.3)
EGFRCR SERPLBLD CKD-EPI 2021: 66 ML/MIN/1.73M*2
ERYTHROCYTE [DISTWIDTH] IN BLOOD BY AUTOMATED COUNT: 16.5 % (ref 11.5–14.5)
GLUCOSE SERPL-MCNC: 81 MG/DL (ref 74–99)
HCT VFR BLD AUTO: 29.1 % (ref 41–52)
HGB BLD-MCNC: 9.2 G/DL (ref 13.5–17.5)
MAGNESIUM SERPL-MCNC: 2.39 MG/DL (ref 1.6–2.4)
MCH RBC QN AUTO: 28.8 PG (ref 26–34)
MCHC RBC AUTO-ENTMCNC: 31.6 G/DL (ref 32–36)
MCV RBC AUTO: 91 FL (ref 80–100)
NRBC BLD-RTO: 0.2 /100 WBCS (ref 0–0)
PHOSPHATE SERPL-MCNC: 3.3 MG/DL (ref 2.5–4.9)
PLATELET # BLD AUTO: 258 X10*3/UL (ref 150–450)
POTASSIUM SERPL-SCNC: 3.5 MMOL/L (ref 3.5–5.3)
RBC # BLD AUTO: 3.19 X10*6/UL (ref 4.5–5.9)
SODIUM SERPL-SCNC: 143 MMOL/L (ref 136–145)
WBC # BLD AUTO: 9.5 X10*3/UL (ref 4.4–11.3)

## 2024-11-07 PROCEDURE — 2500000004 HC RX 250 GENERAL PHARMACY W/ HCPCS (ALT 636 FOR OP/ED)

## 2024-11-07 PROCEDURE — 71045 X-RAY EXAM CHEST 1 VIEW: CPT | Performed by: RADIOLOGY

## 2024-11-07 PROCEDURE — 85027 COMPLETE CBC AUTOMATED: CPT

## 2024-11-07 PROCEDURE — 80069 RENAL FUNCTION PANEL: CPT

## 2024-11-07 PROCEDURE — 71045 X-RAY EXAM CHEST 1 VIEW: CPT

## 2024-11-07 PROCEDURE — 2500000001 HC RX 250 WO HCPCS SELF ADMINISTERED DRUGS (ALT 637 FOR MEDICARE OP)

## 2024-11-07 PROCEDURE — 2500000002 HC RX 250 W HCPCS SELF ADMINISTERED DRUGS (ALT 637 FOR MEDICARE OP, ALT 636 FOR OP/ED)

## 2024-11-07 PROCEDURE — 97530 THERAPEUTIC ACTIVITIES: CPT | Mod: GO

## 2024-11-07 PROCEDURE — 2500000004 HC RX 250 GENERAL PHARMACY W/ HCPCS (ALT 636 FOR OP/ED): Performed by: STUDENT IN AN ORGANIZED HEALTH CARE EDUCATION/TRAINING PROGRAM

## 2024-11-07 PROCEDURE — 99232 SBSQ HOSP IP/OBS MODERATE 35: CPT | Performed by: STUDENT IN AN ORGANIZED HEALTH CARE EDUCATION/TRAINING PROGRAM

## 2024-11-07 PROCEDURE — 2500000005 HC RX 250 GENERAL PHARMACY W/O HCPCS

## 2024-11-07 PROCEDURE — 83735 ASSAY OF MAGNESIUM: CPT

## 2024-11-07 PROCEDURE — 1100000001 HC PRIVATE ROOM DAILY

## 2024-11-07 PROCEDURE — 99232 SBSQ HOSP IP/OBS MODERATE 35: CPT

## 2024-11-07 PROCEDURE — 97530 THERAPEUTIC ACTIVITIES: CPT | Mod: GP

## 2024-11-07 PROCEDURE — 36415 COLL VENOUS BLD VENIPUNCTURE: CPT

## 2024-11-07 RX ORDER — QUETIAPINE FUMARATE 25 MG/1
25 TABLET, FILM COATED ORAL ONCE
Status: DISCONTINUED | OUTPATIENT
Start: 2024-11-07 | End: 2024-11-07

## 2024-11-07 RX ADMIN — ACETAMINOPHEN 975 MG: 325 TABLET ORAL at 08:07

## 2024-11-07 RX ADMIN — ENOXAPARIN SODIUM 30 MG: 30 INJECTION SUBCUTANEOUS at 22:15

## 2024-11-07 RX ADMIN — CYANOCOBALAMIN TAB 1000 MCG 1000 MCG: 1000 TAB at 08:07

## 2024-11-07 RX ADMIN — POLYETHYLENE GLYCOL 3350 17 G: 17 POWDER, FOR SOLUTION ORAL at 08:07

## 2024-11-07 RX ADMIN — CEFTRIAXONE SODIUM 1 G: 1 INJECTION, SOLUTION INTRAVENOUS at 17:30

## 2024-11-07 RX ADMIN — QUETIAPINE FUMARATE 50 MG: 50 TABLET ORAL at 22:15

## 2024-11-07 RX ADMIN — SENNOSIDES 8.6 MG: 8.6 TABLET, FILM COATED ORAL at 22:16

## 2024-11-07 RX ADMIN — QUETIAPINE FUMARATE 25 MG: 25 TABLET ORAL at 08:07

## 2024-11-07 RX ADMIN — ENOXAPARIN SODIUM 30 MG: 30 INJECTION SUBCUTANEOUS at 08:07

## 2024-11-07 RX ADMIN — Medication 9 MG: at 22:15

## 2024-11-07 RX ADMIN — OXYCODONE HYDROCHLORIDE 5 MG: 5 TABLET ORAL at 22:15

## 2024-11-07 RX ADMIN — METOPROLOL TARTRATE 12.5 MG: 25 TABLET, FILM COATED ORAL at 22:16

## 2024-11-07 RX ADMIN — METOPROLOL TARTRATE 12.5 MG: 25 TABLET, FILM COATED ORAL at 08:07

## 2024-11-07 RX ADMIN — TAMSULOSIN HYDROCHLORIDE 0.4 MG: 0.4 CAPSULE ORAL at 22:16

## 2024-11-07 ASSESSMENT — COGNITIVE AND FUNCTIONAL STATUS - GENERAL
PERSONAL GROOMING: A LITTLE
EATING MEALS: A LITTLE
DRESSING REGULAR LOWER BODY CLOTHING: A LOT
DRESSING REGULAR UPPER BODY CLOTHING: A LITTLE
MOVING TO AND FROM BED TO CHAIR: A LOT
DAILY ACTIVITIY SCORE: 15
MOBILITY SCORE: 12
HELP NEEDED FOR BATHING: A LOT
WALKING IN HOSPITAL ROOM: TOTAL
DRESSING REGULAR UPPER BODY CLOTHING: A LOT
TURNING FROM BACK TO SIDE WHILE IN FLAT BAD: A LOT
DAILY ACTIVITIY SCORE: 12
PERSONAL GROOMING: A LITTLE
STANDING UP FROM CHAIR USING ARMS: A LOT
MOVING FROM LYING ON BACK TO SITTING ON SIDE OF FLAT BED WITH BEDRAILS: A LOT
TURNING FROM BACK TO SIDE WHILE IN FLAT BAD: A LOT
WALKING IN HOSPITAL ROOM: A LOT
TOILETING: A LOT
DRESSING REGULAR LOWER BODY CLOTHING: TOTAL
TOILETING: TOTAL
EATING MEALS: A LITTLE
STANDING UP FROM CHAIR USING ARMS: A LOT
HELP NEEDED FOR BATHING: A LOT
MOVING FROM LYING ON BACK TO SITTING ON SIDE OF FLAT BED WITH BEDRAILS: A LITTLE
CLIMB 3 TO 5 STEPS WITH RAILING: TOTAL
CLIMB 3 TO 5 STEPS WITH RAILING: TOTAL
MOVING TO AND FROM BED TO CHAIR: A LOT
MOBILITY SCORE: 10

## 2024-11-07 ASSESSMENT — PAIN SCALES - GENERAL
PAINLEVEL_OUTOF10: 0 - NO PAIN
PAINLEVEL_OUTOF10: 8
PAINLEVEL_OUTOF10: 0 - NO PAIN

## 2024-11-07 ASSESSMENT — PAIN - FUNCTIONAL ASSESSMENT
PAIN_FUNCTIONAL_ASSESSMENT: 0-10
PAIN_FUNCTIONAL_ASSESSMENT: 0-10

## 2024-11-07 NOTE — PROGRESS NOTES
[facility]  TRAUMA SERVICE - PROGRESS NOTE    Patient Name: Tommy Richmond  MRN: [id number]  Admit Date: 1031  : 1935  AGE: 89 y.o.   GENDER: male  8027/8027-A  ==============================================================================  MECHANISM OF INJURY:   89-year-old male who presented after a ground-level fall of unknown mechanism. Due to patient's confusion, history obtained via chart review and conversation w/ patient's son.   LOC (yes/no?): No  Anticoagulant / Anti-platelet Rx? (for what dx?): No  Referring Facility Name (N/A for scene EMR run): [facility]       INJURIES:   Left Hemo/Pneumothorax  L1 Compression fracture  Fracture of left ribs 5-7     OTHER MEDICAL PROBLEMS:  Recurrent UTIs with stage III Kidney disease  HTN  Glaucoma  BPH     INCIDENTAL FINDINGS:  Complex kidney cyst  Cholelithiasis    ==============================================================================  TODAY'S ASSESSMENT AND PLAN OF CARE:  88 y/o male s/p fall with L rib fractures and associated hemopneumothorax, now on the floor from the TICU after pain and O2 requirements controlled. Chest tube replaced overnight as imaging noted pigtail inappropriately placed in the abdomen. Patient's abdominal exam is benign this AM. Patient continues to be intermittently delirious with a benign abdominal exam. Delirium improved this AM, as patient was able to sleep better last night than he did this morning.     - Appreciate input from geriatrics team, especially given patient's current delirium. Will keep patient's nightly oxycodone scheduled given that some amount of patient's delirium may be driven by pain.   - Continuing nightly seroquel at 50mg,  - Patient's goals of care are to maximize time with his wife and family; appreciate input from social work to facilitate this.   - Continue ceftriaxone for UTI; patient will discharge on cefixime.   - Okay for regular diet; bowel regimen w/  ducolax, miralax, as patient has not had regular bowel movements in this hospital thus far. Given delirium, patient will need assistance ordering food.    Plans discussed with Dr. Bergman, attending    Roger Gaytan MD  PGY1 General Surgery  Trauma Surgery [id number]    ==============================================================================  CHIEF COMPLAINT / OVERNIGHT EVENTS:   Patient agitated overnight, given zyprexa. Likely delirium; chest tube to be removed today as patient too acutely delirious to allow CT removal.     MEDICAL HISTORY / ROS:  Admission history and ROS reviewed. Pertinent changes as follows:    PHYSICAL EXAM:  Heart Rate:  []   Temp:  [36.1 °C (97 °F)-36.9 °C (98.4 °F)]   Resp:  [16-18]   BP: (120-175)/(57-89)   SpO2:  [91 %-95 %]   Physical Exam  Constitutional:       General: He is awake. He is not in acute distress.     Comments: Patient lying comfortably in bed, oriented to x3. Drowsy this AM Amnestic to most events of the last week.  HENT:      Head: Normocephalic and atraumatic.      Right Ear: External ear normal.      Left Ear: External ear normal.      Nose: Nose normal.      Mouth/Throat:      Mouth: Mucous membranes are moist.   Eyes:      Extraocular Movements: Extraocular movements intact.      Conjunctiva/sclera: Conjunctivae normal.      Pupils: Pupils are equal, round, and reactive to light.   Cardiovascular:      Rate and Rhythm: Normal rate and regular rhythm.      Pulses: Normal pulses.           Radial pulses are 2+ on the right side and 2+ on the left side.        Dorsalis pedis pulses are 2+ on the right side and 2+ on the left side.      Heart sounds: Normal heart sounds, S1 normal and S2 normal. Heart sounds not distant. No murmur heard.  Pulmonary:      Effort: Pulmonary effort is normal.      Breath sounds: Normal breath sounds.      Comments: Equal chest rise and fall. No respiratory distress.   Chest:      Comments: There is left lower chest wall  ecchymosis with tenderness. Chest tube dressing in place.   Abdominal:      General: Abdomen is flat. There is no distension.      Palpations: Abdomen is soft.      Tenderness: There is no abdominal tenderness.      Comments: There is Left quadrant ecchymosis extending to the midline abdomen.    Genitourinary:     Penis: Normal.    Musculoskeletal:         General: Normal range of motion.      Cervical back: Full passive range of motion without pain and normal range of motion. No tenderness.      Right lower leg: No edema.      Left lower leg: No edema.   Skin:     General: Skin is warm.   Neurological:      General: No focal deficit present.      Mental Status: He is alert. He is disoriented.      Cranial Nerves: No cranial nerve deficit.      Sensory: No sensory deficit.      Motor: No weakness.   Psychiatric:         Mood and Affect: Mood is drowsy.         Behavior: Behavior is cooperative.    IMAGING SUMMARY:  (summary of new imaging findings, not a copy of dictation)  CXR with improved aeration and no pneumothorax    LABS:  Results from last 7 days   Lab Units 11/07/24  0630 11/04/24  0654 11/03/24  0901   WBC AUTO x10*3/uL 9.5 6.9 7.2   HEMOGLOBIN g/dL 9.2* 8.5* 8.4*   HEMATOCRIT % 29.1* 26.9* 26.5*   PLATELETS AUTO x10*3/uL 258 230 237         Results from last 7 days   Lab Units 11/07/24  0630 11/04/24  0654 11/03/24  0901   SODIUM mmol/L 143 141 140   POTASSIUM mmol/L 3.5 3.9 4.1   CHLORIDE mmol/L 109* 109* 106   CO2 mmol/L 25 25 28   BUN mg/dL 28* 33* 43*   CREATININE mg/dL 1.08 1.05 1.67*   CALCIUM mg/dL 7.7* 7.5* 7.8*   GLUCOSE mg/dL 81 85 101*                 I have reviewed all medications, laboratory results, and imaging pertinent for today's encounter.

## 2024-11-07 NOTE — PROGRESS NOTES
Subjective   No acute events overnight. Patient orientated to self, place, and time today. Denies any chest pain or shortness of breath. Reports last having a bowel movement yesterday. States that pain is well controlled with acetaminophen. Has not received Zyprexa overnight. Overnight, vitals were stable. //70 HR 80-97 SpO2 92-95% NC.     Objective   Current Facility-Administered Medications   Medication Dose Route Frequency Provider Last Rate Last Admin    acetaminophen (Tylenol) tablet 975 mg  975 mg oral q8h Roger Gaytan MD   975 mg at 11/07/24 0807    bisacodyl (Dulcolax) suppository 10 mg  10 mg rectal Daily Roger Gaytan MD   10 mg at 11/03/24 1047    cefTRIAXone (Rocephin) 1 g in dextrose (iso) IV 50 mL  1 g intravenous q24h Roger Gaytan MD   Stopped at 11/06/24 1743    cyanocobalamin (Vitamin B-12) tablet 1,000 mcg  1,000 mcg oral Daily Roger Gaytan MD   1,000 mcg at 11/07/24 0807    enoxaparin (Lovenox) syringe 30 mg  30 mg subcutaneous BID Melba Castillo MD   30 mg at 11/07/24 0807    [Held by provider] furosemide (Lasix) tablet 20 mg  20 mg oral Daily Roger Gaytan MD        lidocaine 4 % patch 1 patch  1 patch transdermal Daily Melba Castillo MD   1 patch at 11/03/24 0919    melatonin tablet 9 mg  9 mg oral Nightly Roger Gaytan MD   9 mg at 11/06/24 2031    metoprolol tartrate (Lopressor) tablet 12.5 mg  12.5 mg oral BID Albino León MD   12.5 mg at 11/07/24 0807    naloxone (Narcan) injection 0.2 mg  0.2 mg intravenous q5 min PRN Roger Gaytan MD        oxyCODONE (Roxicodone) immediate release tablet 2.5 mg  2.5 mg oral q4h PRN Roger Gaytan MD        oxyCODONE (Roxicodone) immediate release tablet 5 mg  5 mg oral q4h PRN Roger Gaytan MD   5 mg at 11/06/24 1310    oxyCODONE (Roxicodone) immediate release tablet 5 mg  5 mg oral Nightly Roger Gaytan MD   5 mg at 11/06/24 2032    oxygen (O2) therapy   inhalation  Continuous PRN - O2/gases Edmundo Villa, DO   2 L/min at 11/01/24 0800    polyethylene glycol (Glycolax, Miralax) packet 17 g  17 g oral Daily Edmundo Villa, DO   17 g at 11/07/24 0807    potassium chloride CR (Klor-Con M20) ER tablet 10 mEq  10 mEq oral Once Roger Gaytan MD        QUEtiapine (SEROquel) tablet 25 mg  25 mg oral q AM Michelle Chilel MD   25 mg at 11/07/24 0807    QUEtiapine (SEROquel) tablet 50 mg  50 mg oral Nightly Roger Gaytan MD   50 mg at 11/06/24 2032    sennosides (Senokot) tablet 8.6 mg  1 tablet oral Nightly Roger Gaytan MD   8.6 mg at 11/06/24 2031    tamsulosin (Flomax) 24 hr capsule 0.4 mg  0.4 mg oral Nightly Roger Gaytan MD   0.4 mg at 11/06/24 2033     Physical Exam  HENT:      Head: Normocephalic.      Nose: Nose normal.      Mouth/Throat:      Mouth: Mucous membranes are dry.   Eyes:      Extraocular Movements: Extraocular movements intact.      Conjunctiva/sclera: Conjunctivae normal.   Cardiovascular:      Rate and Rhythm: Normal rate and regular rhythm.   Abdominal:      General: There is no distension.      Palpations: Abdomen is soft.      Comments: Purpura over left chest and abdomen   Genitourinary:     Comments: Dark urine from condom catheter  Musculoskeletal:         General: No swelling or deformity.   Skin:     General: Skin is warm and dry.      Capillary Refill: Capillary refill takes less than 2 seconds.   Neurological:      Comments: Oriented to self, place, and time. Moving all extremities.      Confusion Assessment Method(CAM) for diagnosis of delirium:    1.  Acute onset or fluctuating course: present  2.  Inattention: present  3.  Disorganized thinking: present  4.  Altered level of consciousness: present  CAM: positive    AT Score For Assessment of Delirium and Cognitive Impairment:    Alertness: 0  Normal(fully alert,but not agitated, throughout assessment)=0  Mild sleepiness for <10 seconds after walking, then normal=0  Clearly  abnormal=4  2.  AMT4: 0  No mistakes=0  One mistake=1  Two or more mistakes/untestable=2  3.  Attention: 0  Achieves seven months or more correctly=0  Starts but scores <7 months/ refuses to start=1  Untestable(cannot start because unwell, drowsy, inattentive)=2  4.  Acute: 4  No=0  Yes=4    Total Score: 4  4 or above: Possible delirium +/- cognitive impairment  1-3: Possible cognitive impairment  0: Delirium or severe cognitive impairment unlikely(but delirium still possible if (4) information incomplete)    Last Recorded Vitals      11/6/2024     7:55 AM 11/6/2024    11:53 AM 11/6/2024     4:13 PM 11/6/2024     8:26 PM 11/7/2024    12:59 AM 11/7/2024     4:13 AM 11/7/2024     8:05 AM   Vitals   Systolic 164 175 143 120 149 161 123   Diastolic 86 89 69 57 70 70 64   Heart Rate 84 94 104 94 97 92 80   Temp 36.3 °C (97.3 °F) 36.1 °C (97 °F) 36.8 °C (98.2 °F) 36.9 °C (98.4 °F) 36.6 °C (97.8 °F) 36.6 °C (97.8 °F) 36.5 °C (97.7 °F)   Resp 17 18 18 16 16 16 16      Vitals:    10/31/24 0554   Weight: 65.8 kg (145 lb)      Relevant Results  Lab Results   Component Value Date    TSH 1.47 09/26/2024    APATQQZD06 755 11/06/2024    VITD25 41 11/01/2024     Results from last 7 days   Lab Units 11/07/24  0630 11/04/24  0654 11/03/24  0901   WBC AUTO x10*3/uL 9.5 6.9 7.2   HEMOGLOBIN g/dL 9.2* 8.5* 8.4*   HEMATOCRIT % 29.1* 26.9* 26.5*   SODIUM mmol/L 143 141 140   POTASSIUM mmol/L 3.5 3.9 4.1   CHLORIDE mmol/L 109* 109* 106   CREATININE mg/dL 1.08 1.05 1.67*   BUN mg/dL 28* 33* 43*   CO2 mmol/L 25 25 28       XR chest 1 view  Narrative: Interpreted By:  Vitor Shields,   STUDY:  XR CHEST 1 VIEW;  11/7/2024 6:05 am      INDICATION:  Signs/Symptoms:Evaluate chest tube status.      COMPARISON:  Chest radiograph dated 11/6/2024      ACCESSION NUMBER(S):  [id number]      ORDERING CLINICIAN:  VITOR FARAH      FINDINGS:  AP radiograph of the chest      The heart is normal in size stable in comparison to previous  study.  Atheromatous disease of the aortic arch is noted. Bilateral pulmonary  edema is noted. There is increased opacity within the right lower  lobe in comparison to previous study. Diffuse hazy attenuated opacity  is noted within the left lung. There is blunting of both costophrenic  sulci.      Impression: 1. Bilateral pulmonary edema  2. Subsegmental atelectasis and increased opacity within the right  lower lobe since the previous study  3. Slightly improved pulmonary aeration within the left lower lobe  next number subsegmental atelectasis within the left lower lobe and  small bilateral pleural effusions left greater than right.              Signed by: Cezar Shields 11/7/2024 7:30 AM  Dictation workstation:   [id number]    DATA:  EKG: QTC  Encounter Date: 10/30/24   ECG 12 lead   Result Value    Ventricular Rate 86    Atrial Rate 86    MO Interval 190    QRS Duration 124    QT Interval 366    QTC Calculation(Bazett) 437    P Axis 24    R Axis -42    T Axis -74    QRS Count 14    Q Onset 225    P Onset 130    P Offset 189    T Offset 408    QTC Fredericia 412    Narrative    Normal sinus rhythm  Left axis deviation  Nonspecific intraventricular conduction delay  T wave abnormality, consider inferior ischemia  Abnormal ECG  When compared with ECG of 14-SEP-2024 17:26,  Nonspecific intraventricular conduction delay has replaced Incomplete right bundle branch block  Borderline criteria for Lateral infarct are no longer Present  See ED provider note for full interpretation and clinical correlation  Confirmed by Lucía Galindo ([id number]) on 11/1/2024 12:54:02 PM      Anti-psychotics in 48 hours: Seorquel, Zyprexa   Opioids/Benzodiazepines in 48 hours: Oxycodone   Anticholinergics on board:No  Restraints:No  Indwelling catheters: No   Last BM: None recorded, patient reports last BM 11/6   UO in 24 hours: 1L   Activity in the past 24 hours:  Need for ambulatory devices: None     Assessment/Plan   89 y.o. year old male,  with past medical history relevant for recurrent UTIs, BPH, colon ca s/p resection, poor vision, gait difficulty, htn, presenting for falls w L multiple rib fx, large posterior chest/abd wall hematoma, L ptx/rosalio, s/p L pigtail CT placement 10/31/24, being seen in geriatric consultation for comanagement, currently being followed for acute delirium. Currently remains altered, responds to name, but unable to state his name, would recommend obtaining labs, optimizing pain regimen, and if no change tomorrow, repeating head imaging.      Principal Problem:    Hemopneumothorax on left     1. Acute mixed (hyperactive/hypoactive) delirium   - CAM positive, 4AT: 4  - Delirium slightly improved from yesterday   - Optimize pain regimen as below with Tylenol and Tramadol. Currently received scheduled oxy.   - Patient is voiding well with regular Bms. Last had a bowel movement yesterday. Will consider making bisacodyl PRN.   - Continue melatonin 9 mg at 7 pm  - Recommend quetiapine 25 mg at 9pm scheduled. Recommend discontinuing AM dose of Seroquel as this will make the patient more drowsy/sleeping during the day.    - Please obtain EKG to follow EKG after starting Seroquel. Last  on 10/31.   - Consider repeat CT head tomorrow if worsening symptoms of delirium   - Keep patient active during the day, which may help patient sleep at night   - UTI may be contributing to delirium      Please consider the following general measures for minimizing delirium in a hospitalized patient:   -Bright lights during the day, keeps blinds up, switch all lights on   -avoid disturbances at night. Encourage at least 6 hours uninterrupted sleep. Consider d/c 4am vitals check  -avoid benzodiazepines, sedatives. Minimize opioids   -daily orientation to time and place by the staff   -out of bed to chair few hours everyday  - encourage stimulating activities during the day if possible      2. Acute UTI   - Afebrile  - Continue Ceftriaxone until  11/13      3. Rib fracture, large abdominal wall hematoma, s/p L CT placement for hemopneumothorax  - Continue Tylenol 975 TID  - Trial lidocaine patches for pain on chest (can trial two given large area of bruising).  - Start Tramadol 25 mg q6. Also schedule 25 mg Tramadol at bedtime. Continue Narcan PRN.     4M AGE-FRIENDLY INITIATIVE:  What matters most to patient: Not to fall   Medications: oxycodone, olanzapine   Mentation: CAM positive, 4 AT: 4  Mobility: uses a walker      Geriatric medicine will continue to follow the patient. Thank you for allowing geriatric medicine to be involved in the care of your patient. Geriatric medicine consultation team is available during work hours Monday through Friday. For any emergency issues requiring immediate assistance over the weekend, please page Geriatrics pager [phone].     Pt seen and discussed with Dr. Rajput and recommendations conveyed to primary team.     Mercy Scott MD  Internal Medicine, PGY-1

## 2024-11-07 NOTE — PROGRESS NOTES
Physical Therapy    Physical Therapy Treatment    Patient Name: Tommy Richmond  MRN: [id number]  Department: [place] [address]  Room: 80/8027-A  Today's Date: 11/7/2024  Time Calculation  Start Time: 1115  Stop Time: 1139  Time Calculation (min): 24 min         Assessment/Plan   PT Assessment  End of Session Communication: Bedside nurse  Assessment Comment: Pt tolerated session well.  Demonstrates improved sitting balance and able to complete sitting ther ex with close supervision this session.  Pt able to complete 2 sit to stand transfers with Max Ax2, B knee blocking, and B feet blocking with pt's knees buckling during 2nd trial. Unable to achieve full upright posture either trial.  Pt continues to benefit from skilled PT and remains appropriate for moderate intensity PT upon discharge.  End of Session Patient Position: Bed, 3 rail up, Alarm on  PT Plan  Inpatient/Swing Bed or Outpatient: Inpatient  PT Plan  Treatment/Interventions: Bed mobility, Transfer training, Gait training, Stair training, Balance training, Neuromuscular re-education, Strengthening, Endurance training, Therapeutic exercise, Therapeutic activity, Home exercise program, Postural re-education  PT Plan: Ongoing PT  PT Frequency: 5 times per week  PT Discharge Recommendations: Moderate intensity level of continued care  Equipment Recommended upon Discharge: Wheelchair, Lift (hospital bed)  PT Recommended Transfer Status: Total assist  PT - OK to Discharge: Yes (eval complete and discharge recommendations made)      General Visit Information:   PT  Visit  PT Received On: 11/07/24  General  Reason for Referral: GLF: 1. Left Hemo/Pneumothorax  2. L1 Compression fracture  3. Fracture of left ribs 5-7  Past Medical History Relevant to Rehab: 1. Recurrent UTIs with stage III Kidney disease  2. HTN  3. Glaucoma  4. BPH  Co-Treatment: OT  Co-Treatment Reason: to maximize safety with mobility and therapeutic potential in pt with AMPAC <10  Prior to Session  Communication: Bedside nurse  Patient Position Received: Bed, 3 rail up, Alarm on (MD in room)  General Comment: Pt cleared for PT by RN.  Pt alert and agreeable to PT. +PIV, external catheter, 2L O2 via NC    Subjective   Precautions:  Precautions  Medical Precautions: Fall precautions, Spinal precautions    Objective   Pain:  Pain Assessment  Pain Assessment: 0-10  0-10 (Numeric) Pain Score:  (not rated)  Pain Type: Acute pain  Pain Location:  (L shoulder with ther ex and R hip in sitting)  Pain Interventions: Repositioned, Rest  Response to Interventions: no change in pain  Cognition:  Cognition  Overall Cognitive Status: Impaired  Orientation Level: Disoriented to situation  Coordination:  Movements are Fluid and Coordinated: No  Postural Control:  Postural Control  Postural Control: Impaired (forward flexed posture with L lateral lean, Max A progressing to close supervision however min A for UE ther ex)  Static Sitting Balance  Static Sitting-Balance Support: Bilateral upper extremity supported, Feet supported  Static Sitting-Level of Assistance: Maximum assistance, Close supervision (Max A progressing to close supervision)  Static Sitting-Comment/Number of Minutes: ~10 minutes  Dynamic Sitting Balance  Dynamic Sitting-Balance Support: Bilateral upper extremity supported, Feet supported  Dynamic Sitting-Level of Assistance: Close supervision  Dynamic Sitting-Balance:  (LE ther ex)  Static Standing Balance  Static Standing-Balance Support: Bilateral upper extremity supported (arm-in-arm with PT and OT)  Static Standing-Level of Assistance: Maximum assistance  Static Standing-Comment/Number of Minutes: <30 seconds, unable to achieve full upright standing  Activity Tolerance:  Activity Tolerance  Endurance: Tolerates 10 - 20 min exercise with multiple rests  Treatments:  Therapeutic Exercise  Therapeutic Exercise Performed: Yes  Therapeutic Exercise Activity 1: Seated LESLEY hollis x10 each LE    Therapeutic  Activity  Therapeutic Activity Performed: Yes  Therapeutic Activity 1: bed mobility, sitting balance, transfers, pt edu    Bed Mobility  Bed Mobility: Yes  Bed Mobility 1  Bed Mobility 1: Supine to sitting  Level of Assistance 1: Maximum assistance, +2  Bed Mobility Comments 1: HOB maximally elevated  Bed Mobility 2  Bed Mobility  2: Sitting to supine  Level of Assistance 2: Maximum assistance, +2  Bed Mobility Comments 2: HOB flat  Bed Mobility 3  Bed Mobility 3: Scooting (to HOB in supine)  Level of Assistance 3: Dependent, +2    Ambulation/Gait Training  Ambulation/Gait Training Performed: No  Transfers  Transfer: Yes  Transfer 1  Transfer From 1: Bed to  Transfer to 1: Stand  Technique 1: Sit to stand, Stand to sit  Transfer Device 1:  (no device)  Transfer Level of Assistance 1: Maximum assistance, +2  Trials/Comments 1: unable to achieve full upright postion x2 trials with B knees buckling during 2nd trial    Outcome Measures:  [facility] Basic Mobility  Turning from your back to your side while in a flat bed without using bedrails: A lot  Moving from lying on your back to sitting on the side of a flat bed without using bedrails: A lot  Moving to and from bed to chair (including a wheelchair): A lot  Standing up from a chair using your arms (e.g. wheelchair or bedside chair): A lot  To walk in hospital room: Total  Climbing 3-5 steps with railing: Total  Basic Mobility - Total Score: 10    Education Documentation  Body Mechanics, taught by Karyna Gold PT at 11/7/2024 11:54 AM.  Learner: Patient  Readiness: Acceptance  Method: Explanation  Response: Needs Reinforcement  Comment: spine precautions    Precautions, taught by Karyna Gold PT at 11/7/2024 11:54 AM.  Learner: Patient  Readiness: Acceptance  Method: Explanation  Response: Needs Reinforcement  Comment: spine precautions    Home Exercise Program, taught by Karyna Gold PT at 11/7/2024 11:54 AM.  Learner: Patient  Readiness: Acceptance  Method:  Explanation  Response: Needs Reinforcement  Comment: spine precautions    Mobility Training, taught by Karyna Gold, PT at 11/7/2024 11:54 AM.  Learner: Patient  Readiness: Acceptance  Method: Explanation  Response: Needs Reinforcement  Comment: spine precautions    Education Comments  No comments found.        Encounter Problems       Encounter Problems (Active)       Balance       STG - Maintains dynamic standing balance with upper extremity support on LRAD with Min A x1  (Progressing)       Start:  11/01/24    Expected End:  11/22/24            STG - Maintains dynamic sitting balance without upper extremity support with Sup (Progressing)       Start:  11/01/24    Expected End:  11/22/24               Mobility       STG - Patient will ambulate x20 ft with LRAD with Min A x1 (Not Progressing)       Start:  11/01/24    Expected End:  11/22/24               PT Transfers       STG - Patient will perform bed mobility with Min A (Progressing)       Start:  11/01/24    Expected End:  11/22/24            STG - Patient will transfer sit to and from stand with Min A using LRAD (Progressing)       Start:  11/01/24    Expected End:  11/22/24

## 2024-11-07 NOTE — CARE PLAN
The patient's goals for the shift include safety     The clinical goals for the shift include pt will remain injury free      Problem: Pain  Goal: Takes deep breaths with improved pain control throughout the shift  Outcome: Progressing  Goal: Turns in bed with improved pain control throughout the shift  Outcome: Progressing  Goal: Walks with improved pain control throughout the shift  Outcome: Progressing  Goal: Performs ADL's with improved pain control throughout shift  Outcome: Progressing  Goal: Participates in PT with improved pain control throughout the shift  Outcome: Progressing  Goal: Free from opioid side effects throughout the shift  Outcome: Progressing  Goal: Free from acute confusion related to pain meds throughout the shift  Outcome: Progressing     Problem: Skin  Goal: Decreased wound size/increased tissue granulation at next dressing change  Outcome: Progressing  Goal: Participates in plan/prevention/treatment measures  Outcome: Progressing  Goal: Prevent/manage excess moisture  Outcome: Progressing  Goal: Prevent/minimize sheer/friction injuries  Outcome: Progressing  Goal: Promote/optimize nutrition  Outcome: Progressing  Goal: Promote skin healing  Outcome: Progressing

## 2024-11-07 NOTE — PROGRESS NOTES
Occupational Therapy    Occupational Therapy Treatment    Name: Tommy Richmond  MRN: [id number]  : 1935  Date: 24  Room: [address] [place], [place] [address]      Time Calculation  Start Time: 1115  Stop Time: 1139  Time Calculation (min): 24 min    Assessment:  Evaluation/Treatment Tolerance: Patient tolerated treatment well  Medical Staff Made Aware: Yes  End of Session Communication: Bedside nurse  End of Session Patient Position: Bed, 3 rail up, Alarm on  Plan:  Treatment Interventions: ADL retraining, Functional transfer training, UE strengthening/ROM, Endurance training, Patient/family training, Equipment evaluation/education, Neuromuscular reeducation, Fine motor coordination activities, Compensatory technique education  OT Frequency: 3 times per week  OT Discharge Recommendations: Moderate intensity level of continued care  Equipment Recommended upon Discharge: Wheelchair, Lift (hospital bed)  OT Recommended Transfer Status: Assist of 2  OT - OK to Discharge: Yes    Subjective   General:  OT Last Visit  OT Received On: 24  Co-Treatment: PT  Co-Treatment Reason: To maximize pt's safety and benefit from provided therapy services  Prior to Session Communication: Bedside nurse  Patient Position Received: Bed, 3 rail up, Alarm on  Family/Caregiver Present: No  General Comment: Pt pleasant and agreeable to OT session, initially complaining about care, but improves overall demeanor   Precautions:  Medical Precautions: Fall precautions, Spinal precautions  Vitals:  Vital Signs (Past 2hrs)        Date/Time Vitals Session Patient Position Pulse Resp SpO2 BP MAP (mmHg)    24 1205 --  --  82  16  96 %  77/36  --     24 1227 --  --  87  --  --  131/61  --                   Lines/Tubes/Drains:  External Urinary Catheter Male (Active)   Number of days: 6       Cognition:  Overall Cognitive Status: Impaired  Orientation Level: Disoriented to situation  Following Commands: Follows one step commands with  repetition  Safety Judgment: Decreased awareness of need for assistance  Safety/Judgement: Exceptions to WFL  Complex Functional Tasks: Minimal  Novel Situations: Minimal  Routine Tasks: Minimal  Unable to Self-Monitor and Self-Correct Consistently: Minimal  Insight: Moderate  Processing Speed: Delayed    Pain Assessment:  Pain Assessment  Pain Assessment: 0-10  0-10 (Numeric) Pain Score:  (Not rated)  Pain Type: Acute pain  Pain Location: Hip (R hip and L shoulder)  Pain Interventions: Repositioned  Response to Interventions: Best at rest in supine     Objective   Bed Mobility/Transfers:   Bed Mobility  Bed Mobility: Yes  Bed Mobility 1  Bed Mobility 1: Supine to sitting, Sitting to supine  Level of Assistance 1: Maximum assistance, +2  Bed Mobility Comments 1: Max A x2 with use of draw sheet, cues for increased IND and participation    Transfers  Transfer: Yes  Transfer 1  Transfer From 1: Sit to, Stand to  Transfer to 1: Stand, Sit  Technique 1: Sit to stand, Stand to sit  Transfer Device 1:  (Hilario HHA)  Transfer Level of Assistance 1: Maximum assistance, +2  Trials/Comments 1: Unable to achieve full upright stand  Therapy/Activity:      Therapeutic Activity  Therapeutic Activity Performed: Yes  Therapeutic Activity 1: Static sitting EOB for approx 10-12 minutes, education provided on imporved positioning for seated balance. Initially pt requires Max A to maintain sitting, following repositioning and cues for hand and LE placement, pt improved to SBA with forward flexed posture.  Therapeutic Activity 2: Multiple sit to stand attempts from EOB, extended rest breaks between trials. He requires Max A x2 for lifting to stand but is unable to achieve full upright standing. Pt with forward flexed posture despite cues to lift head to prepare for standing ADL tasks.  Therapeutic Activity 3: Dynamic reaching activity at EOB to challenge seated posture, balance, and functional reach. Pt reached with alternating hands to  various targets to promote functional reach. He demos difficulty reaching to target that requires shoulder flexion 2/2 pain and stiffness in shoulders.  Outcome Measures:  [facility] Daily Activity  Putting on and taking off regular lower body clothing: Total  Bathing (including washing, rinsing, drying): A lot  Putting on and taking off regular upper body clothing: A lot  Toileting, which includes using toilet, bedpan or urinal: Total  Taking care of personal grooming such as brushing teeth: A little  Eating Meals: A little  Daily Activity - Total Score: 12     Education Documentation  Body Mechanics, taught by Emeterio Horne OT at 11/7/2024 12:28 PM.  Learner: Patient  Readiness: Acceptance  Method: Explanation, Demonstration  Response: Verbalizes Understanding, Needs Reinforcement    Precautions, taught by Emeterio Horne OT at 11/7/2024 12:28 PM.  Learner: Patient  Readiness: Acceptance  Method: Explanation, Demonstration  Response: Verbalizes Understanding, Needs Reinforcement    ADL Training, taught by Emeterio Horne OT at 11/7/2024 12:28 PM.  Learner: Patient  Readiness: Acceptance  Method: Explanation, Demonstration  Response: Verbalizes Understanding, Needs Reinforcement    Education Comments  No comments found.    Goals:  Encounter Problems       Encounter Problems (Active)       ADLs       Patient will perform UB and LB bathing with minimal assist  level of assistance. (Progressing)       Start:  11/01/24    Expected End:  11/22/24            Patient with complete upper body dressing with independent level of assistance donning and doffing all UE clothes with no adaptive equipment while edge of bed  (Progressing)       Start:  11/01/24    Expected End:  11/22/24            Patient with complete lower body dressing with stand by assist level of assistance donning and doffing all LE clothes  with no adaptive equipment while edge of bed  (Progressing)       Start:  11/01/24    Expected End:  11/22/24             Patient will complete daily grooming tasks brushing teeth and washing face/hair with independent level of assistance and PRN adaptive equipment while edge of bed . (Progressing)       Start:  11/01/24    Expected End:  11/22/24               COGNITION/SAFETY       Patient will recall and adhere to spinal precautions during all functional mobility/ADL tasks in order to demonstrate improved understanding and promote healing post op (Progressing)       Start:  11/01/24    Expected End:  11/22/24               TRANSFERS       Patient will perform bed mobility stand by assist level of assistance in order to improve safety and independence with mobility (Progressing)       Start:  11/01/24    Expected End:  11/22/24            Patient will complete functional transfer to chair with least restrictive device with minimal assist  level of assistance. (Progressing)       Start:  11/01/24    Expected End:  11/22/24 11/07/24 at 12:28 PM   Emeterio Horne, OT   [phone]

## 2024-11-07 NOTE — PROGRESS NOTES
Transitional Care Coordinator Note: Patient discussed in morning rounds, per medical team (trauma) patient is medically ready. Discharge dispo: Plan for patient to discharge home with HC and transition to AL in 2-3 days per patient's son. TCC contacted [place] [facility] [address] intake team to review for possible acceptance. TCC updated by PT that rec remains for moderate intensity and the following DME, wheelchair, alma lift and hospital bed. TCC placed call to patient's son Yury Richmond [phone] to update on therapy recs and DME recs. Yury expressed understanding and declined SNF. Per Yury patient has chair lift (not alma lift), wheelchair and walkers at home. Yury stated patient's home is one level and he plans to be in home with parents 24/7 to assist. TCC updated PT on DME in patient's home to determine if chair lift would be sufficient in place of alma lift, pending response. ADOD 11/8. Plan for AL facility to evaluate patient 1-2 days post discharge for patient to admit to AL.       Cele Avila RN BSN   Transitional Care Coordinator       UPDATE 11/7/2024 15:30   TCC and PT Karyna WILLIS spoke with patient's son Yury to discuss discharge plan, therapy recs and DME needs. Yury expressed understanding of therapy recs and stated he spoke with trauma attending JARED Bergman and stated he is open to patient discharging to SNF. He selected [facility] as FOC. TCC built and sent referral to SNF to review. Yury to come to bedside to see patient and confirm SNF vs home with HC. Trauma team updated.     Cele Avila RN BSN   Transitional Care Coordinator

## 2024-11-07 NOTE — CARE PLAN
The patient's goals for the shift include      The clinical goals for the shift include pt will be fall free during shift

## 2024-11-08 ENCOUNTER — APPOINTMENT (OUTPATIENT)
Dept: RADIOLOGY | Facility: HOSPITAL | Age: 89
DRG: 183 | End: 2024-11-08
Payer: MEDICARE

## 2024-11-08 PROCEDURE — 2500000004 HC RX 250 GENERAL PHARMACY W/ HCPCS (ALT 636 FOR OP/ED): Performed by: STUDENT IN AN ORGANIZED HEALTH CARE EDUCATION/TRAINING PROGRAM

## 2024-11-08 PROCEDURE — 73130 X-RAY EXAM OF HAND: CPT | Mod: RT

## 2024-11-08 PROCEDURE — 71045 X-RAY EXAM CHEST 1 VIEW: CPT | Performed by: RADIOLOGY

## 2024-11-08 PROCEDURE — 1100000001 HC PRIVATE ROOM DAILY

## 2024-11-08 PROCEDURE — 97530 THERAPEUTIC ACTIVITIES: CPT | Mod: GP

## 2024-11-08 PROCEDURE — 2500000002 HC RX 250 W HCPCS SELF ADMINISTERED DRUGS (ALT 637 FOR MEDICARE OP, ALT 636 FOR OP/ED)

## 2024-11-08 PROCEDURE — 2500000004 HC RX 250 GENERAL PHARMACY W/ HCPCS (ALT 636 FOR OP/ED)

## 2024-11-08 PROCEDURE — 71045 X-RAY EXAM CHEST 1 VIEW: CPT

## 2024-11-08 PROCEDURE — 2500000005 HC RX 250 GENERAL PHARMACY W/O HCPCS

## 2024-11-08 PROCEDURE — 73130 X-RAY EXAM OF HAND: CPT | Mod: RIGHT SIDE | Performed by: STUDENT IN AN ORGANIZED HEALTH CARE EDUCATION/TRAINING PROGRAM

## 2024-11-08 PROCEDURE — 99233 SBSQ HOSP IP/OBS HIGH 50: CPT

## 2024-11-08 PROCEDURE — 2500000001 HC RX 250 WO HCPCS SELF ADMINISTERED DRUGS (ALT 637 FOR MEDICARE OP)

## 2024-11-08 PROCEDURE — 99232 SBSQ HOSP IP/OBS MODERATE 35: CPT | Performed by: STUDENT IN AN ORGANIZED HEALTH CARE EDUCATION/TRAINING PROGRAM

## 2024-11-08 RX ORDER — IBUPROFEN 400 MG/1
400 TABLET ORAL ONCE
Status: COMPLETED | OUTPATIENT
Start: 2024-11-08 | End: 2024-11-08

## 2024-11-08 RX ORDER — CEFDINIR 300 MG/1
300 CAPSULE ORAL 2 TIMES DAILY
Qty: 6 CAPSULE | Refills: 0 | Status: CANCELLED | OUTPATIENT
Start: 2024-11-08 | End: 2024-11-11

## 2024-11-08 RX ORDER — OXYCODONE HYDROCHLORIDE 5 MG/1
2.5 TABLET ORAL EVERY 4 HOURS PRN
Qty: 15 TABLET | Refills: 0 | Status: CANCELLED | OUTPATIENT
Start: 2024-11-08

## 2024-11-08 RX ADMIN — Medication 9 MG: at 22:03

## 2024-11-08 RX ADMIN — BISACODYL 10 MG: 10 SUPPOSITORY RECTAL at 11:02

## 2024-11-08 RX ADMIN — CYANOCOBALAMIN TAB 1000 MCG 1000 MCG: 1000 TAB at 09:20

## 2024-11-08 RX ADMIN — ENOXAPARIN SODIUM 30 MG: 30 INJECTION SUBCUTANEOUS at 09:20

## 2024-11-08 RX ADMIN — ACETAMINOPHEN 975 MG: 325 TABLET ORAL at 16:03

## 2024-11-08 RX ADMIN — TAMSULOSIN HYDROCHLORIDE 0.4 MG: 0.4 CAPSULE ORAL at 22:02

## 2024-11-08 RX ADMIN — ENOXAPARIN SODIUM 30 MG: 30 INJECTION SUBCUTANEOUS at 22:02

## 2024-11-08 RX ADMIN — QUETIAPINE FUMARATE 50 MG: 50 TABLET ORAL at 22:03

## 2024-11-08 RX ADMIN — METOPROLOL TARTRATE 12.5 MG: 25 TABLET, FILM COATED ORAL at 09:20

## 2024-11-08 RX ADMIN — CEFTRIAXONE SODIUM 1 G: 1 INJECTION, SOLUTION INTRAVENOUS at 16:03

## 2024-11-08 RX ADMIN — ACETAMINOPHEN 975 MG: 325 TABLET ORAL at 09:20

## 2024-11-08 RX ADMIN — OXYCODONE HYDROCHLORIDE 5 MG: 5 TABLET ORAL at 22:03

## 2024-11-08 RX ADMIN — SENNOSIDES 8.6 MG: 8.6 TABLET, FILM COATED ORAL at 22:02

## 2024-11-08 RX ADMIN — IBUPROFEN 400 MG: 400 TABLET, FILM COATED ORAL at 14:12

## 2024-11-08 RX ADMIN — POLYETHYLENE GLYCOL 3350 17 G: 17 POWDER, FOR SOLUTION ORAL at 09:20

## 2024-11-08 RX ADMIN — METOPROLOL TARTRATE 12.5 MG: 25 TABLET, FILM COATED ORAL at 22:02

## 2024-11-08 ASSESSMENT — COGNITIVE AND FUNCTIONAL STATUS - GENERAL
TOILETING: A LOT
MOVING FROM LYING ON BACK TO SITTING ON SIDE OF FLAT BED WITH BEDRAILS: A LOT
MOVING TO AND FROM BED TO CHAIR: TOTAL
EATING MEALS: A LITTLE
DAILY ACTIVITIY SCORE: 15
CLIMB 3 TO 5 STEPS WITH RAILING: TOTAL
CLIMB 3 TO 5 STEPS WITH RAILING: TOTAL
MOBILITY SCORE: 12
MOBILITY SCORE: 13
MOVING FROM LYING ON BACK TO SITTING ON SIDE OF FLAT BED WITH BEDRAILS: A LITTLE
TOILETING: A LOT
DRESSING REGULAR UPPER BODY CLOTHING: A LITTLE
MOVING FROM LYING ON BACK TO SITTING ON SIDE OF FLAT BED WITH BEDRAILS: A LITTLE
MOVING TO AND FROM BED TO CHAIR: A LITTLE
WALKING IN HOSPITAL ROOM: TOTAL
PERSONAL GROOMING: A LITTLE
STANDING UP FROM CHAIR USING ARMS: A LOT
TURNING FROM BACK TO SIDE WHILE IN FLAT BAD: A LOT
DAILY ACTIVITIY SCORE: 15
HELP NEEDED FOR BATHING: A LOT
MOBILITY SCORE: 9
PERSONAL GROOMING: A LITTLE
WALKING IN HOSPITAL ROOM: A LOT
WALKING IN HOSPITAL ROOM: A LOT
DRESSING REGULAR LOWER BODY CLOTHING: A LOT
HELP NEEDED FOR BATHING: A LOT
CLIMB 3 TO 5 STEPS WITH RAILING: TOTAL
STANDING UP FROM CHAIR USING ARMS: A LOT
EATING MEALS: A LITTLE
TURNING FROM BACK TO SIDE WHILE IN FLAT BAD: A LOT
DRESSING REGULAR UPPER BODY CLOTHING: A LITTLE
TURNING FROM BACK TO SIDE WHILE IN FLAT BAD: A LOT
STANDING UP FROM CHAIR USING ARMS: A LOT
DRESSING REGULAR LOWER BODY CLOTHING: A LOT
MOVING TO AND FROM BED TO CHAIR: A LOT

## 2024-11-08 ASSESSMENT — PAIN SCALES - GENERAL: PAINLEVEL_OUTOF10: 3

## 2024-11-08 ASSESSMENT — PAIN - FUNCTIONAL ASSESSMENT: PAIN_FUNCTIONAL_ASSESSMENT: 0-10

## 2024-11-08 NOTE — PROGRESS NOTES
Transitional Care Coordinator Note: Patient discussed in morning rounds, per medical team (trauma) patient is medically ready. Discharge dispo: TCC met with patient, patient's son Yury and PT at bedside to discuss discharge plan. Yury expressed understanding of patient's physical stated and therapy's recommendations for moderate intensity. Yury agreeable to patient discharging to SNF [facility]. Facility agreeable to accept but will not have a bed available until Monday 11/11. Yury expressed understanding and agreeable. [facility] to initiate pre-cert tomorrow. SW team updated and following, see note.       Cele Avila RN BSN   Transitional Care Coordinator

## 2024-11-08 NOTE — CARE PLAN
The patient's goals for the shift include  pain control and safety from injury     The clinical goals for the shift include pt will be fall free during shift      Problem: Skin  Goal: Decreased wound size/increased tissue granulation at next dressing change  Outcome: Progressing  Flowsheets (Taken 11/7/2024 2358)  Decreased wound size/increased tissue granulation at next dressing change:   Promote sleep for wound healing   Protective dressings over bony prominences  Goal: Participates in plan/prevention/treatment measures  Outcome: Progressing  Flowsheets (Taken 11/7/2024 2358)  Participates in plan/prevention/treatment measures: Increase activity/out of bed for meals  Goal: Prevent/manage excess moisture  Outcome: Progressing  Flowsheets (Taken 11/7/2024 2358)  Prevent/manage excess moisture:   Moisturize dry skin   Follow provider orders for dressing changes   Monitor for/manage infection if present   Cleanse incontinence/protect with barrier cream  Goal: Prevent/minimize sheer/friction injuries  Outcome: Progressing  Flowsheets (Taken 11/7/2024 2358)  Prevent/minimize sheer/friction injuries:   Use pull sheet   Increase activity/out of bed for meals   Turn/reposition every 2 hours/use positioning/transfer devices  Goal: Promote/optimize nutrition  Outcome: Progressing  Flowsheets (Taken 11/7/2024 2358)  Promote/optimize nutrition:   Assist with feeding   Consume > 50% meals/supplements   Offer water/supplements/favorite foods  Goal: Promote skin healing  Outcome: Progressing  Flowsheets (Taken 11/7/2024 2358)  Promote skin healing:   Turn/reposition every 2 hours/use positioning/transfer devices   Protective dressings over bony prominences     Problem: Pain  Goal: Takes deep breaths with improved pain control throughout the shift  Outcome: Progressing  Goal: Turns in bed with improved pain control throughout the shift  Outcome: Progressing  Goal: Walks with improved pain control throughout the shift  Outcome:  Progressing  Goal: Performs ADL's with improved pain control throughout shift  Outcome: Progressing  Goal: Participates in PT with improved pain control throughout the shift  Outcome: Progressing  Goal: Free from opioid side effects throughout the shift  Outcome: Progressing  Goal: Free from acute confusion related to pain meds throughout the shift  Outcome: Progressing     Problem: Safety - Adult  Goal: Free from fall injury  Outcome: Progressing     Problem: Discharge Planning  Goal: Discharge to home or other facility with appropriate resources  Outcome: Progressing     Problem: Chronic Conditions and Co-morbidities  Goal: Patient's chronic conditions and co-morbidity symptoms are monitored and maintained or improved  Outcome: Progressing     Problem: Fall/Injury  Goal: Not fall by end of shift  Outcome: Progressing  Goal: Be free from injury by end of the shift  Outcome: Progressing  Goal: Verbalize understanding of personal risk factors for fall in the hospital  Outcome: Progressing  Goal: Verbalize understanding of risk factor reduction measures to prevent injury from fall in the home  Outcome: Progressing  Goal: Use assistive devices by end of the shift  Outcome: Progressing  Goal: Pace activities to prevent fatigue by end of the shift  Outcome: Progressing

## 2024-11-08 NOTE — CARE PLAN
The patient's goals for the shift include  increase PO intake    The clinical goals for the shift include increase PO intake

## 2024-11-08 NOTE — PROGRESS NOTES
Physical Therapy    Physical Therapy Treatment    Patient Name: Tommy Richmond  MRN: [id number]  Department: [place] [id number]  Room: [facility]27-  Today's Date: 11/8/2024  Time Calculation  Start Time: 1457  Stop Time: 1525  Time Calculation (min): 28 min         Assessment/Plan   PT Assessment  End of Session Communication: Bedside nurse, PCT/NA/CTA  Assessment Comment: Pt tolerated session with fatigue.  Unable to complete ther ex due to fatigue but did complete multiple episodes of rolling and 1 sit to stand transfer (~50%) with max Ax2 for all.  Pt continues to benefit from skilled PT and remains appropriate for moderate intensity PT upon discharge.  End of Session Patient Position: Bed, 3 rail up, Alarm on (family in room)  PT Plan  Inpatient/Swing Bed or Outpatient: Inpatient  PT Plan  Treatment/Interventions: Bed mobility, Transfer training, Gait training, Stair training, Balance training, Neuromuscular re-education, Strengthening, Endurance training, Therapeutic exercise, Therapeutic activity, Home exercise program, Postural re-education  PT Plan: Ongoing PT  PT Frequency: 5 times per week  PT Discharge Recommendations: Moderate intensity level of continued care  Equipment Recommended upon Discharge: Wheelchair, Lift (hospital bed)  PT Recommended Transfer Status: Total assist  PT - OK to Discharge: Yes (eval complete and discharge recommendations made)      General Visit Information:   PT  Visit  PT Received On: 11/08/24  General  Reason for Referral: GLF: 1. Left Hemo/Pneumothorax  2. L1 Compression fracture  3. Fracture of left ribs 5-7  Past Medical History Relevant to Rehab: 1. Recurrent UTIs with stage III Kidney disease  2. HTN  3. Glaucoma  4. BPH  Family/Caregiver Present: Yes (son and other family member present throughout session)  Co-Treatment:  (rehab aide assisted with bed mobility and sit to stand transfer)  Prior to Session Communication: Bedside nurse  Patient Position Received: Bed, 3 rail up, Alarm  "on  General Comment: Pt cleared for PT by RN.  Pt alert and agreeable to PT. +PIV, external catheter, 2L O2 via NC    Subjective   Precautions:  Precautions  Medical Precautions: Fall precautions, Spinal precautions    Objective   Pain:  Pain Assessment  Pain Assessment: 0-10  0-10 (Numeric) Pain Score:  (not rated)  Pain Type: Acute pain  Pain Location:  (R hand and neck)  Pain Interventions: Repositioned, Rest  Response to Interventions: no change in pain  Cognition:  Cognition  Overall Cognitive Status: Within Functional Limits  Orientation Level: Oriented X4 (but pt unsure of his answers stating \"I assume from people telling me that...\")  Coordination:  Movements are Fluid and Coordinated: No  Postural Control:  Postural Control  Postural Control: Impaired (forward flexed posture max A progressing to CGA)  Static Sitting Balance  Static Sitting-Balance Support: Bilateral upper extremity supported, Feet supported  Static Sitting-Level of Assistance: Maximum assistance, Minimum assistance (max A progressing to min A)  Dynamic Sitting Balance  Dynamic Sitting-Balance Support: Bilateral upper extremity supported, Feet supported  Dynamic Sitting-Level of Assistance: Minimum assistance  Dynamic Sitting-Balance: Forward lean  Static Standing Balance  Static Standing-Balance Support: Bilateral upper extremity supported (arm-in-arm)  Static Standing-Level of Assistance: Maximum assistance  Static Standing-Comment/Number of Minutes: unable to achieve >50% of stand  Activity Tolerance:  Activity Tolerance  Endurance: Tolerates 10 - 20 min exercise with multiple rests  Treatments:  Therapeutic Activity  Therapeutic Activity Performed: Yes  Therapeutic Activity 1: bed mobility, sit to stand transfer, pt edu    Bed Mobility  Bed Mobility: Yes  Bed Mobility 1  Bed Mobility 1: Rolling right, Rolling left  Level of Assistance 1: Maximum assistance, +2  Bed Mobility Comments 1: 3x throughout session for hygiene, pericare, and " noa change due to multiple BMs throughout session  Bed Mobility 2  Bed Mobility  2: Supine to sitting, Sitting to supine  Level of Assistance 2: Maximum assistance, +2  Bed Mobility Comments 2: HOB elevated  Bed Mobility 3  Bed Mobility 3: Scooting (to HOB in supine)  Level of Assistance 3: Dependent, +2  Bed Mobility Comments 3: 3x throughout session    Ambulation/Gait Training  Ambulation/Gait Training Performed: No  Transfers  Transfer: Yes  Transfer 1  Transfer From 1: Bed to  Transfer to 1: Stand  Technique 1: Sit to stand, Stand to sit  Transfer Device 1:  (no device)  Transfer Level of Assistance 1: Maximum assistance, +2  Trials/Comments 1: able to achieve ~50% of full stand    Outcome Measures:  [facility] Basic Mobility  Turning from your back to your side while in a flat bed without using bedrails: A lot  Moving from lying on your back to sitting on the side of a flat bed without using bedrails: A lot  Moving to and from bed to chair (including a wheelchair): Total  Standing up from a chair using your arms (e.g. wheelchair or bedside chair): A lot  To walk in hospital room: Total  Climbing 3-5 steps with railing: Total  Basic Mobility - Total Score: 9    Education Documentation  Body Mechanics, taught by Karyna Gold PT at 11/8/2024  3:43 PM.  Learner: Patient  Readiness: Acceptance  Method: Explanation  Response: Needs Reinforcement  Comment: spine precautions    Precautions, taught by Karyna Gold PT at 11/8/2024  3:43 PM.  Learner: Patient  Readiness: Acceptance  Method: Explanation  Response: Needs Reinforcement  Comment: spine precautions    Home Exercise Program, taught by Karyna Gold PT at 11/8/2024  3:43 PM.  Learner: Patient  Readiness: Acceptance  Method: Explanation  Response: Needs Reinforcement  Comment: spine precautions    Mobility Training, taught by Karyna Gold PT at 11/8/2024  3:43 PM.  Learner: Patient  Readiness: Acceptance  Method: Explanation  Response: Needs  Reinforcement  Comment: spine precautions    Education Comments  No comments found.        Encounter Problems       Encounter Problems (Active)       Balance       STG - Maintains dynamic standing balance with upper extremity support on LRAD with Min A x1  (Progressing)       Start:  11/01/24    Expected End:  11/22/24            STG - Maintains dynamic sitting balance without upper extremity support with Sup (Progressing)       Start:  11/01/24    Expected End:  11/22/24               Mobility       STG - Patient will ambulate x20 ft with LRAD with Min A x1 (Not Progressing)       Start:  11/01/24    Expected End:  11/22/24               PT Transfers       STG - Patient will perform bed mobility with Min A (Progressing)       Start:  11/01/24    Expected End:  11/22/24            STG - Patient will transfer sit to and from stand with Min A using LRAD (Progressing)       Start:  11/01/24    Expected End:  11/22/24

## 2024-11-08 NOTE — PROGRESS NOTES
Subjective   No acute events overnight. This morning patient is eating his breakfast without assistance. He is orientated to self, place, date and time today. He does report right wrist pain and swelling. ROS otherwise negative.     Objective   Current Facility-Administered Medications   Medication Dose Route Frequency Provider Last Rate Last Admin    acetaminophen (Tylenol) tablet 975 mg  975 mg oral q8h Roger Gaytan MD   975 mg at 11/08/24 0920    bisacodyl (Dulcolax) suppository 10 mg  10 mg rectal Daily Roger Gaytan MD   10 mg at 11/08/24 1102    cefTRIAXone (Rocephin) 1 g in dextrose (iso) IV 50 mL  1 g intravenous q24h Roger Gaytan MD   Stopped at 11/07/24 1800    cyanocobalamin (Vitamin B-12) tablet 1,000 mcg  1,000 mcg oral Daily Roger Gaytan MD   1,000 mcg at 11/08/24 0920    enoxaparin (Lovenox) syringe 30 mg  30 mg subcutaneous BID Melba Castillo MD   30 mg at 11/08/24 0920    [Held by provider] furosemide (Lasix) tablet 20 mg  20 mg oral Daily Roger Gaytan MD        lidocaine 4 % patch 1 patch  1 patch transdermal Daily Melba Castillo MD   1 patch at 11/03/24 0919    melatonin tablet 9 mg  9 mg oral Nightly Roger Gaytan MD   9 mg at 11/07/24 2215    metoprolol tartrate (Lopressor) tablet 12.5 mg  12.5 mg oral BID Albino León MD   12.5 mg at 11/08/24 0920    naloxone (Narcan) injection 0.2 mg  0.2 mg intravenous q5 min PRN Roger Gaytan MD        oxyCODONE (Roxicodone) immediate release tablet 2.5 mg  2.5 mg oral q4h PRN Roger Gaytan MD        oxyCODONE (Roxicodone) immediate release tablet 5 mg  5 mg oral q4h PRN Roger Gaytan MD   5 mg at 11/06/24 1310    oxyCODONE (Roxicodone) immediate release tablet 5 mg  5 mg oral Nightly Roger Gaytan MD   5 mg at 11/07/24 2215    oxygen (O2) therapy   inhalation Continuous PRN - O2/gases Edmundo Villa DO   2 L/min at 11/01/24 0800    polyethylene glycol (Glycolax, Miralax) packet 17 g   17 g oral Daily Edumndo Villa DO   17 g at 11/08/24 0920    potassium chloride CR (Klor-Con M20) ER tablet 10 mEq  10 mEq oral Once Roger Gaytan MD        QUEtiapine (SEROquel) tablet 50 mg  50 mg oral Nightly Roger Gaytan MD   50 mg at 11/07/24 2215    sennosides (Senokot) tablet 8.6 mg  1 tablet oral Nightly Roger Gaytan MD   8.6 mg at 11/07/24 2216    tamsulosin (Flomax) 24 hr capsule 0.4 mg  0.4 mg oral Nightly Roger Gaytan MD   0.4 mg at 11/07/24 2216     Physical Exam  HENT:      Head: Normocephalic.      Nose: Nose normal.      Mouth/Throat:      Mouth: Mucous membranes are dry.   Eyes:      Extraocular Movements: Extraocular movements intact.      Conjunctiva/sclera: Conjunctivae normal.   Cardiovascular:      Rate and Rhythm: Normal rate and regular rhythm.   Abdominal:      General: There is no distension.      Palpations: Abdomen is soft.      Comments: Purpura over left chest and abdomen   Genitourinary:     Comments: Dark urine from condom catheter  Musculoskeletal:         General: No swelling or deformity.   Skin:     General: Skin is warm and dry.      Capillary Refill: Capillary refill takes less than 2 seconds.   Neurological:      Comments: Oriented to self, place, and time. Moving all extremities.      Confusion Assessment Method(CAM) for diagnosis of delirium:    1.  Acute onset or fluctuating course: present  2.  Inattention: absent  3.  Disorganized thinking: absent  4.  Altered level of consciousness: absent  CAM: negative    AT Score For Assessment of Delirium and Cognitive Impairment:    Alertness: 0  Normal(fully alert,but not agitated, throughout assessment)=0  Mild sleepiness for <10 seconds after walking, then normal=0  Clearly abnormal=4  2.  AMT4: 0  No mistakes=0  One mistake=1  Two or more mistakes/untestable=2  3.  Attention: 0  Achieves seven months or more correctly=0  Starts but scores <7 months/ refuses to start=1  Untestable(cannot start because  unwell, drowsy, inattentive)=2  4.  Acute: 4  No=0  Yes=4    Total Score: 4  4 or above: Possible delirium +/- cognitive impairment  1-3: Possible cognitive impairment  0: Delirium or severe cognitive impairment unlikely(but delirium still possible if (4) information incomplete)    Last Recorded Vitals      11/7/2024    12:27 PM 11/7/2024     3:51 PM 11/7/2024     8:02 PM 11/8/2024    12:25 AM 11/8/2024     3:59 AM 11/8/2024     7:00 AM 11/8/2024     1:00 PM   Vitals   Systolic 131 109 155 101 151 146 130   Diastolic 61 55 62 58 77 74 71   Heart Rate 87 77 91 76 79 81 73   Temp  36.8 °C (98.2 °F) 36.4 °C (97.5 °F) 36.6 °C (97.9 °F) 36.3 °C (97.4 °F) 36.3 °C (97.4 °F) 36.3 °C (97.3 °F)   Resp  17 14 16 16 17 18      Vitals:    10/31/24 0554   Weight: 65.8 kg (145 lb)      Relevant Results  Lab Results   Component Value Date    TSH 1.47 09/26/2024    ZWUSZURC02 755 11/06/2024    VITD25 41 11/01/2024     Results from last 7 days   Lab Units 11/07/24  0630 11/04/24  0654 11/03/24  0901   WBC AUTO x10*3/uL 9.5 6.9 7.2   HEMOGLOBIN g/dL 9.2* 8.5* 8.4*   HEMATOCRIT % 29.1* 26.9* 26.5*   SODIUM mmol/L 143 141 140   POTASSIUM mmol/L 3.5 3.9 4.1   CHLORIDE mmol/L 109* 109* 106   CREATININE mg/dL 1.08 1.05 1.67*   BUN mg/dL 28* 33* 43*   CO2 mmol/L 25 25 28       XR hand right 3+ views  Narrative: Interpreted By:  Stevenson Antony,  and Alek Smith   STUDY:  XR HAND RIGHT 3+ VIEWS; ;  11/8/2024 10:43 am      INDICATION:  Signs/Symptoms:Right hand pain.          COMPARISON:  None.      ACCESSION NUMBER(S):  [id number]      ORDERING CLINICIAN:  VITOR FARAH      FINDINGS:  Three views of the right hand are provided.      There is moderate osteoarthrosis of multiple joints including the 1st  carpometacarpal, 1st and 3rd distal interphalangeal joints with  osteophytosis, joint space narrowing, and subchondral sclerosis.  There is also mild carpometacarpal and intercarpal joint degenerative  changes. No acute fractures or  malalignment.  There is mild soft tissue swelling about the wrist.      Impression: Moderate osteoarthrosis of the right hand particularly of the 1st CMC  and 1st and 3rd DIP joints.      I personally reviewed the images/study and I agree with the findings  as stated by Resident Sarah Gaston. This study was interpreted at  [facility], [place], [place].      MACRO:  None      Signed by: Stevenson Antony 11/8/2024 11:36 AM  Dictation workstation:   [id number]    DATA:  EKG: QTC  Encounter Date: 10/30/24   ECG 12 lead   Result Value    Ventricular Rate 86    Atrial Rate 86    GA Interval 190    QRS Duration 124    QT Interval 366    QTC Calculation(Bazett) 437    P Axis 24    R Axis -42    T Axis -74    QRS Count 14    Q Onset 225    P Onset 130    P Offset 189    T Offset 408    QTC Fredericia 412    Narrative    Normal sinus rhythm  Left axis deviation  Nonspecific intraventricular conduction delay  T wave abnormality, consider inferior ischemia  Abnormal ECG  When compared with ECG of 14-SEP-2024 17:26,  Nonspecific intraventricular conduction delay has replaced Incomplete right bundle branch block  Borderline criteria for Lateral infarct are no longer Present  See ED provider note for full interpretation and clinical correlation  Confirmed by Lucía Galindo ([id number]) on 11/1/2024 12:54:02 PM      Anti-psychotics in 48 hours: Seroquel  Opioids/Benzodiazepines in 48 hours: Oxycodone   Anticholinergics on board: No  Restraints: No  Indwelling catheters: No   Last BM: today  UO in 24 hours: 600cc   Activity in the past 24 hours: PT/OT  Need for ambulatory devices: None     Assessment/Plan   89 y.o. year old male, with past medical history relevant for recurrent UTIs, BPH, colon ca s/p resection, poor vision, gait difficulty, htn, presenting for falls w L multiple rib fx, large posterior chest/abd wall hematoma, L ptx/rosalio, s/p L pigtail CT placement 10/31/24, being seen in  geriatric consultation for comanagement, currently being followed for acute delirium. Patient demonstrating significant improvement in mentation.      Principal Problem:    Hemopneumothorax on left     1. Acute mixed (hyperactive/hypoactive) delirium (improving)  - CAM negative, 4AT: 4  - Delirium improved from yesterday   - Optimize pain regimen as below with Tylenol and Tramadol. Currently receiving scheduled oxy.   - Patient is voiding well with regular Bms. Last had a bowel movement today. Consider making bisacodyl PRN.   - Continue melatonin 9 mg at 7 pm  - Recommend quetiapine 25 mg at 9pm scheduled.   - Consider repeat CT head tomorrow if worsening symptoms of delirium   - Keep patient active during the day, which may help patient sleep at night   - UTI may be contributing to delirium      Please consider the following general measures for minimizing delirium in a hospitalized patient:   -Bright lights during the day, keeps blinds up, switch all lights on   -avoid disturbances at night. Encourage at least 6 hours uninterrupted sleep. Consider d/c 4am vitals check  -avoid benzodiazepines, sedatives. Minimize opioids   -daily orientation to time and place by the staff   -out of bed to chair few hours everyday  - encourage stimulating activities during the day if possible      2. Acute UTI   - Afebrile  - Continue Ceftriaxone until 11/13      3. Rib fracture, large abdominal wall hematoma, s/p L CT placement for hemopneumothorax  - Continue Tylenol 975 TID  - Trial lidocaine patches for pain on chest (can trial two given large area of bruising).  - Start Tramadol 25 mg q6. Also schedule 25 mg Tramadol at bedtime. Continue Narcan PRN.     4M AGE-FRIENDLY INITIATIVE:  What matters most to patient: Not to fall   Medications: oxycodone, Seroquel   Mentation: CAM positive, 4 AT: 4  Mobility: uses a walker      Geriatric medicine will continue to follow the patient. Thank you for allowing geriatric medicine to be  involved in the care of your patient. Geriatric medicine consultation team is available during work hours Monday through Friday. For any emergency issues requiring immediate assistance over the weekend, please page Geriatrics pager [phone].     Pt seen and discussed with Dr. Rajput.     Faith Blank MD  Internal Medicine, PGY-1

## 2024-11-08 NOTE — PROGRESS NOTES
FAWAD spoke with Mercedes, from [facility]. Initially, patient was declined due to bed availability. Mercedes confirmed that the facility will have available beds on Monday. Patient will require a pre-cert for discharge. [facility] to initiate pre-cert tomorrow. FAWAD will continue to follow to facilitate discharge plan.       JOHN Roldan

## 2024-11-09 ENCOUNTER — APPOINTMENT (OUTPATIENT)
Dept: RADIOLOGY | Facility: HOSPITAL | Age: 89
DRG: 183 | End: 2024-11-09
Payer: MEDICARE

## 2024-11-09 PROCEDURE — 2500000004 HC RX 250 GENERAL PHARMACY W/ HCPCS (ALT 636 FOR OP/ED)

## 2024-11-09 PROCEDURE — 99232 SBSQ HOSP IP/OBS MODERATE 35: CPT

## 2024-11-09 PROCEDURE — 2500000002 HC RX 250 W HCPCS SELF ADMINISTERED DRUGS (ALT 637 FOR MEDICARE OP, ALT 636 FOR OP/ED)

## 2024-11-09 PROCEDURE — 71045 X-RAY EXAM CHEST 1 VIEW: CPT

## 2024-11-09 PROCEDURE — 2500000001 HC RX 250 WO HCPCS SELF ADMINISTERED DRUGS (ALT 637 FOR MEDICARE OP)

## 2024-11-09 PROCEDURE — 2500000004 HC RX 250 GENERAL PHARMACY W/ HCPCS (ALT 636 FOR OP/ED): Performed by: STUDENT IN AN ORGANIZED HEALTH CARE EDUCATION/TRAINING PROGRAM

## 2024-11-09 PROCEDURE — 71045 X-RAY EXAM CHEST 1 VIEW: CPT | Performed by: RADIOLOGY

## 2024-11-09 PROCEDURE — 1100000001 HC PRIVATE ROOM DAILY

## 2024-11-09 PROCEDURE — 2500000005 HC RX 250 GENERAL PHARMACY W/O HCPCS

## 2024-11-09 RX ORDER — BISACODYL 10 MG/1
10 SUPPOSITORY RECTAL DAILY PRN
Status: DISCONTINUED | OUTPATIENT
Start: 2024-11-09 | End: 2024-11-12 | Stop reason: HOSPADM

## 2024-11-09 RX ORDER — QUETIAPINE FUMARATE 25 MG/1
25 TABLET, FILM COATED ORAL NIGHTLY
Status: DISCONTINUED | OUTPATIENT
Start: 2024-11-09 | End: 2024-11-12

## 2024-11-09 RX ADMIN — ENOXAPARIN SODIUM 30 MG: 30 INJECTION SUBCUTANEOUS at 23:07

## 2024-11-09 RX ADMIN — OXYCODONE HYDROCHLORIDE 5 MG: 5 TABLET ORAL at 23:32

## 2024-11-09 RX ADMIN — Medication 9 MG: at 23:06

## 2024-11-09 RX ADMIN — ACETAMINOPHEN 975 MG: 325 TABLET ORAL at 16:06

## 2024-11-09 RX ADMIN — ENOXAPARIN SODIUM 30 MG: 30 INJECTION SUBCUTANEOUS at 10:03

## 2024-11-09 RX ADMIN — METOPROLOL TARTRATE 12.5 MG: 25 TABLET, FILM COATED ORAL at 23:07

## 2024-11-09 RX ADMIN — CYANOCOBALAMIN TAB 1000 MCG 1000 MCG: 1000 TAB at 10:03

## 2024-11-09 RX ADMIN — METOPROLOL TARTRATE 12.5 MG: 25 TABLET, FILM COATED ORAL at 10:03

## 2024-11-09 RX ADMIN — ACETAMINOPHEN 975 MG: 325 TABLET ORAL at 05:07

## 2024-11-09 RX ADMIN — TAMSULOSIN HYDROCHLORIDE 0.4 MG: 0.4 CAPSULE ORAL at 23:07

## 2024-11-09 RX ADMIN — QUETIAPINE FUMARATE 25 MG: 25 TABLET ORAL at 23:07

## 2024-11-09 RX ADMIN — ACETAMINOPHEN 975 MG: 325 TABLET ORAL at 23:08

## 2024-11-09 RX ADMIN — CEFTRIAXONE SODIUM 1 G: 1 INJECTION, SOLUTION INTRAVENOUS at 16:47

## 2024-11-09 ASSESSMENT — COGNITIVE AND FUNCTIONAL STATUS - GENERAL
MOBILITY SCORE: 11
EATING MEALS: A LITTLE
STANDING UP FROM CHAIR USING ARMS: A LOT
TURNING FROM BACK TO SIDE WHILE IN FLAT BAD: A LOT
WALKING IN HOSPITAL ROOM: A LOT
MOVING TO AND FROM BED TO CHAIR: A LOT
DRESSING REGULAR LOWER BODY CLOTHING: A LOT
CLIMB 3 TO 5 STEPS WITH RAILING: TOTAL
PERSONAL GROOMING: A LITTLE
MOVING FROM LYING ON BACK TO SITTING ON SIDE OF FLAT BED WITH BEDRAILS: A LOT
HELP NEEDED FOR BATHING: A LOT
DRESSING REGULAR UPPER BODY CLOTHING: A LITTLE
DAILY ACTIVITIY SCORE: 15
TOILETING: A LOT

## 2024-11-09 ASSESSMENT — PAIN - FUNCTIONAL ASSESSMENT: PAIN_FUNCTIONAL_ASSESSMENT: WONG-BAKER FACES

## 2024-11-09 ASSESSMENT — PAIN SCALES - GENERAL
PAINLEVEL_OUTOF10: 0 - NO PAIN
PAINLEVEL_OUTOF10: 2
PAINLEVEL_OUTOF10: 7

## 2024-11-09 ASSESSMENT — PAIN SCALES - WONG BAKER: WONGBAKER_NUMERICALRESPONSE: HURTS WHOLE LOT

## 2024-11-09 NOTE — PROGRESS NOTES
[facility]  TRAUMA SERVICE - PROGRESS NOTE    Patient Name: Tommy Richmond  MRN: [id number]  Admit Date: 1031  : 1935  AGE: 89 y.o.   GENDER: male  8027/8027-A  ==============================================================================  MECHANISM OF INJURY:   89-year-old male who presented after a ground-level fall of unknown mechanism. Due to patient's confusion, history obtained via chart review and conversation w/ patient's son.   LOC (yes/no?): No  Anticoagulant / Anti-platelet Rx? (for what dx?): No  Referring Facility Name (N/A for scene EMR run): [facility]    INJURIES:   Left Hemo/Pneumothorax  L1 Compression fracture  Fracture of left ribs 5-7     OTHER MEDICAL PROBLEMS:  Recurrent UTIs with stage III Kidney disease  HTN  Glaucoma  BPH     INCIDENTAL FINDINGS:  Complex kidney cyst  Cholelithiasis    ==============================================================================  TODAY'S ASSESSMENT AND PLAN OF CARE:    #L hemo/pneumothorax, L 5-7 rib fx  - s/p pigtail, removed   - daily CXR  - On RA, O2 as needed to maintain SpO2 >92%  - encourage ICS use  - multimodal pain regimen  - PT/OT: moderate intensity    #L1 compression fx  -ortho spine consulted  - OOBAT, no restrictions    #acute mixed delirium, resolving  - geriatrics following, appreciate recs  - Seroquel and melatonin at night, Seroquel decreased from 50 to 25mg  - A&O x3 this AM    #UTI, hx recurrent UTIs with stage III Kidney disease   - 7 days of ceftriaxone completed     #FEN/GI/  - regular diet, assist with ordering food  - bowel regimen w/ miralax, dulcolax    #DVT PPX  - SCDs, Lovenox    Dispo: continue care on trauma floor, plan to DC to [facility], will have bed available Monday.     Patient and plan discussed with attending, Dr Bergman.    Briseyda Ames PA-C  Trauma, Critical Care, and Acute Care Surgery  Floor: [id number] TSICU:  [id number]  ==============================================================================  CHIEF COMPLAINT / OVERNIGHT EVENTS:   Patient resting comfortably in bed, no acute events overnight. A&O x 3 but reports confusion regarding his plan and thought he had to go somewhere today. Explained that he was accepted at a SNF but they do not have any beds available until Monday. Swelling of R hand observed yesterday, likely infiltrated IV. XR hand negative, applied ice pack, will continue to monitor swelling.     MEDICAL HISTORY / ROS:  Admission history and ROS reviewed. Pertinent changes as follows:    PHYSICAL EXAM:  Heart Rate:  [69-84]   Temp:  [35.4 °C (95.7 °F)-36.8 °C (98.2 °F)]   Resp:  [16-18]   BP: (113-135)/(66-85)   SpO2:  [95 %-99 %]   Physical Exam  Constitutional:       General: He is awake. He is not in acute distress.     Comments: Patient lying comfortably in bed, oriented to x3.  HENT:      Head: Normocephalic and atraumatic.      Right Ear: External ear normal.      Left Ear: External ear normal.      Nose: Nose normal.      Mouth/Throat:      Mouth: Mucous membranes are moist.   Eyes:      Extraocular Movements: Extraocular movements intact.      Conjunctiva/sclera: Conjunctivae normal.      Pupils: Pupils are equal, round, and reactive to light.   Cardiovascular:      Rate and Rhythm: Normal rate and regular rhythm.      Pulses: Normal pulses.           Radial pulses are 2+ on the right side and 2+ on the left side.        Dorsalis pedis pulses are 2+ on the right side and 2+ on the left side.      Heart sounds: Normal heart sounds, S1 normal and S2 normal. Heart sounds not distant. No murmur heard.  Pulmonary:      Effort: Pulmonary effort is normal.      Breath sounds: Normal breath sounds.      Comments: Equal chest rise and fall. No respiratory distress.   Chest:      Comments: There is left lower chest wall ecchymosis with tenderness. Abdominal:      General: Abdomen is flat. There is no  distension.      Palpations: Abdomen is soft.      Tenderness: There is no abdominal tenderness.      Comments: There is Left quadrant ecchymosis extending to the midline abdomen.    Genitourinary:     Penis: Normal.    Musculoskeletal:         General: Normal range of motion.      R. Hand: Mild swelling around wrist. Intact ROM, mild tenderness, NV intact.     Cervical back: Full passive range of motion without pain and normal range of motion. No tenderness.      Right lower leg: No edema.      Left lower leg: No edema.   Skin:     General: Skin is warm.   Neurological:      General: No focal deficit present.      Mental Status: He is alert and oriented.      Cranial Nerves: No cranial nerve deficit.      Sensory: No sensory deficit.      Motor: No weakness.   Psychiatric:         Mood and Affect: Mood is drowsy.         Behavior: Behavior is cooperative.    IMAGING SUMMARY:  (summary of new imaging findings, not a copy of dictation)  CXR with improved aeration and no pneumothorax    LABS:  Results from last 7 days   Lab Units 11/07/24  0630 11/04/24  0654 11/03/24  0901   WBC AUTO x10*3/uL 9.5 6.9 7.2   HEMOGLOBIN g/dL 9.2* 8.5* 8.4*   HEMATOCRIT % 29.1* 26.9* 26.5*   PLATELETS AUTO x10*3/uL 258 230 237         Results from last 7 days   Lab Units 11/07/24  0630 11/04/24  0654 11/03/24  0901   SODIUM mmol/L 143 141 140   POTASSIUM mmol/L 3.5 3.9 4.1   CHLORIDE mmol/L 109* 109* 106   CO2 mmol/L 25 25 28   BUN mg/dL 28* 33* 43*   CREATININE mg/dL 1.08 1.05 1.67*   CALCIUM mg/dL 7.7* 7.5* 7.8*   GLUCOSE mg/dL 81 85 101*                 I have reviewed all medications, laboratory results, and imaging pertinent for today's encounter.

## 2024-11-09 NOTE — CARE PLAN
The patient's goals for the shift include safety     The clinical goals for the shift include patient will remain free from falls throughout the shift.         Problem: Pain  Goal: Takes deep breaths with improved pain control throughout the shift  Outcome: Progressing  Goal: Turns in bed with improved pain control throughout the shift  Outcome: Progressing  Goal: Walks with improved pain control throughout the shift  Outcome: Progressing  Goal: Performs ADL's with improved pain control throughout shift  Outcome: Progressing  Goal: Participates in PT with improved pain control throughout the shift  Outcome: Progressing  Goal: Free from opioid side effects throughout the shift  Outcome: Progressing  Goal: Free from acute confusion related to pain meds throughout the shift  Outcome: Progressing     Problem: Safety - Adult  Goal: Free from fall injury  Outcome: Progressing     Problem: Discharge Planning  Goal: Discharge to home or other facility with appropriate resources  Outcome: Progressing     Problem: Fall/Injury  Goal: Not fall by end of shift  Outcome: Progressing  Goal: Be free from injury by end of the shift  Outcome: Progressing  Goal: Verbalize understanding of personal risk factors for fall in the hospital  Outcome: Progressing  Goal: Verbalize understanding of risk factor reduction measures to prevent injury from fall in the home  Outcome: Progressing  Goal: Use assistive devices by end of the shift  Outcome: Progressing  Goal: Pace activities to prevent fatigue by end of the shift  Outcome: Progressing

## 2024-11-10 ENCOUNTER — APPOINTMENT (OUTPATIENT)
Dept: RADIOLOGY | Facility: HOSPITAL | Age: 89
DRG: 183 | End: 2024-11-10
Payer: MEDICARE

## 2024-11-10 PROCEDURE — 1100000001 HC PRIVATE ROOM DAILY

## 2024-11-10 PROCEDURE — 2500000004 HC RX 250 GENERAL PHARMACY W/ HCPCS (ALT 636 FOR OP/ED): Performed by: STUDENT IN AN ORGANIZED HEALTH CARE EDUCATION/TRAINING PROGRAM

## 2024-11-10 PROCEDURE — 2500000005 HC RX 250 GENERAL PHARMACY W/O HCPCS

## 2024-11-10 PROCEDURE — 2500000002 HC RX 250 W HCPCS SELF ADMINISTERED DRUGS (ALT 637 FOR MEDICARE OP, ALT 636 FOR OP/ED)

## 2024-11-10 PROCEDURE — 71045 X-RAY EXAM CHEST 1 VIEW: CPT

## 2024-11-10 PROCEDURE — 2500000001 HC RX 250 WO HCPCS SELF ADMINISTERED DRUGS (ALT 637 FOR MEDICARE OP)

## 2024-11-10 PROCEDURE — 71045 X-RAY EXAM CHEST 1 VIEW: CPT | Performed by: RADIOLOGY

## 2024-11-10 PROCEDURE — 2500000005 HC RX 250 GENERAL PHARMACY W/O HCPCS: Performed by: STUDENT IN AN ORGANIZED HEALTH CARE EDUCATION/TRAINING PROGRAM

## 2024-11-10 PROCEDURE — 2500000004 HC RX 250 GENERAL PHARMACY W/ HCPCS (ALT 636 FOR OP/ED)

## 2024-11-10 PROCEDURE — 2500000001 HC RX 250 WO HCPCS SELF ADMINISTERED DRUGS (ALT 637 FOR MEDICARE OP): Performed by: PHYSICIAN ASSISTANT

## 2024-11-10 PROCEDURE — 99231 SBSQ HOSP IP/OBS SF/LOW 25: CPT | Performed by: PHYSICIAN ASSISTANT

## 2024-11-10 RX ORDER — TALC
9 POWDER (GRAM) TOPICAL NIGHTLY
Qty: 90 TABLET | Refills: 0 | Status: SHIPPED | OUTPATIENT
Start: 2024-11-10 | End: 2024-12-10

## 2024-11-10 RX ORDER — OXYCODONE HYDROCHLORIDE 5 MG/1
5 TABLET ORAL EVERY 6 HOURS PRN
Status: DISCONTINUED | OUTPATIENT
Start: 2024-11-10 | End: 2024-11-11

## 2024-11-10 RX ORDER — POLYETHYLENE GLYCOL 3350 17 G/17G
17 POWDER, FOR SOLUTION ORAL DAILY
Qty: 510 G | Refills: 0 | Status: SHIPPED | OUTPATIENT
Start: 2024-11-10

## 2024-11-10 RX ORDER — BISACODYL 10 MG/1
10 SUPPOSITORY RECTAL DAILY PRN
Start: 2024-11-10

## 2024-11-10 RX ORDER — DOCUSATE SODIUM 100 MG/1
100 CAPSULE, LIQUID FILLED ORAL 2 TIMES DAILY
Status: CANCELLED
Start: 2024-11-10

## 2024-11-10 RX ORDER — OXYCODONE HYDROCHLORIDE 5 MG/1
2.5 TABLET ORAL EVERY 6 HOURS PRN
Status: DISCONTINUED | OUTPATIENT
Start: 2024-11-10 | End: 2024-11-11

## 2024-11-10 RX ORDER — DOCUSATE SODIUM 100 MG/1
100 CAPSULE, LIQUID FILLED ORAL 2 TIMES DAILY
Status: DISCONTINUED | OUTPATIENT
Start: 2024-11-10 | End: 2024-11-12 | Stop reason: HOSPADM

## 2024-11-10 RX ORDER — QUETIAPINE FUMARATE 25 MG/1
25 TABLET, FILM COATED ORAL NIGHTLY PRN
Start: 2024-11-10 | End: 2024-11-12

## 2024-11-10 RX ORDER — ACETAMINOPHEN 325 MG/1
975 TABLET ORAL EVERY 8 HOURS
Qty: 90 TABLET | Refills: 0 | Status: SHIPPED | OUTPATIENT
Start: 2024-11-10 | End: 2024-11-20

## 2024-11-10 RX ORDER — LANOLIN ALCOHOL/MO/W.PET/CERES
1000 CREAM (GRAM) TOPICAL DAILY
Qty: 30 TABLET | Refills: 0 | Status: SHIPPED | OUTPATIENT
Start: 2024-11-10 | End: 2024-12-10

## 2024-11-10 RX ADMIN — TAMSULOSIN HYDROCHLORIDE 0.4 MG: 0.4 CAPSULE ORAL at 20:41

## 2024-11-10 RX ADMIN — METOPROLOL TARTRATE 12.5 MG: 25 TABLET, FILM COATED ORAL at 20:41

## 2024-11-10 RX ADMIN — OXYCODONE HYDROCHLORIDE 5 MG: 5 TABLET ORAL at 03:51

## 2024-11-10 RX ADMIN — CYANOCOBALAMIN TAB 1000 MCG 1000 MCG: 1000 TAB at 09:22

## 2024-11-10 RX ADMIN — METOPROLOL TARTRATE 12.5 MG: 25 TABLET, FILM COATED ORAL at 09:22

## 2024-11-10 RX ADMIN — SENNOSIDES 8.6 MG: 8.6 TABLET, FILM COATED ORAL at 20:41

## 2024-11-10 RX ADMIN — ENOXAPARIN SODIUM 30 MG: 30 INJECTION SUBCUTANEOUS at 09:22

## 2024-11-10 RX ADMIN — DOCUSATE SODIUM 100 MG: 100 CAPSULE, LIQUID FILLED ORAL at 16:48

## 2024-11-10 RX ADMIN — ACETAMINOPHEN 975 MG: 325 TABLET ORAL at 09:27

## 2024-11-10 RX ADMIN — ENOXAPARIN SODIUM 30 MG: 30 INJECTION SUBCUTANEOUS at 20:40

## 2024-11-10 RX ADMIN — FUROSEMIDE 20 MG: 20 TABLET ORAL at 09:22

## 2024-11-10 RX ADMIN — QUETIAPINE FUMARATE 25 MG: 25 TABLET ORAL at 20:41

## 2024-11-10 RX ADMIN — POLYETHYLENE GLYCOL 3350 17 G: 17 POWDER, FOR SOLUTION ORAL at 09:22

## 2024-11-10 RX ADMIN — Medication 9 MG: at 20:41

## 2024-11-10 RX ADMIN — LIDOCAINE 1 PATCH: 4 PATCH TOPICAL at 09:27

## 2024-11-10 RX ADMIN — ACETAMINOPHEN 975 MG: 325 TABLET ORAL at 16:48

## 2024-11-10 RX ADMIN — OXYCODONE HYDROCHLORIDE 5 MG: 5 TABLET ORAL at 09:41

## 2024-11-10 RX ADMIN — OXYCODONE HYDROCHLORIDE 5 MG: 5 TABLET ORAL at 20:40

## 2024-11-10 ASSESSMENT — COGNITIVE AND FUNCTIONAL STATUS - GENERAL
DAILY ACTIVITIY SCORE: 14
MOVING FROM LYING ON BACK TO SITTING ON SIDE OF FLAT BED WITH BEDRAILS: A LITTLE
DRESSING REGULAR LOWER BODY CLOTHING: A LOT
STANDING UP FROM CHAIR USING ARMS: A LOT
MOVING TO AND FROM BED TO CHAIR: A LOT
HELP NEEDED FOR BATHING: A LOT
MOBILITY SCORE: 13
WALKING IN HOSPITAL ROOM: A LOT
TOILETING: A LOT
EATING MEALS: A LOT
DRESSING REGULAR UPPER BODY CLOTHING: A LITTLE
CLIMB 3 TO 5 STEPS WITH RAILING: TOTAL
PERSONAL GROOMING: A LITTLE
TURNING FROM BACK TO SIDE WHILE IN FLAT BAD: A LITTLE

## 2024-11-10 ASSESSMENT — PAIN - FUNCTIONAL ASSESSMENT
PAIN_FUNCTIONAL_ASSESSMENT: 0-10
PAIN_FUNCTIONAL_ASSESSMENT: 0-10

## 2024-11-10 ASSESSMENT — PAIN SCALES - GENERAL
PAINLEVEL_OUTOF10: 7

## 2024-11-10 ASSESSMENT — PAIN DESCRIPTION - DESCRIPTORS: DESCRIPTORS: SHARP

## 2024-11-10 NOTE — CARE PLAN
The patient's goals for the shift include  safety    The clinical goals for the shift include patient will remain free from falls throughout the shift      Problem: Pain  Goal: Takes deep breaths with improved pain control throughout the shift  Outcome: Progressing  Goal: Turns in bed with improved pain control throughout the shift  Outcome: Progressing  Goal: Walks with improved pain control throughout the shift  Outcome: Progressing  Goal: Performs ADL's with improved pain control throughout shift  Outcome: Progressing  Goal: Participates in PT with improved pain control throughout the shift  Outcome: Progressing  Goal: Free from opioid side effects throughout the shift  Outcome: Progressing  Goal: Free from acute confusion related to pain meds throughout the shift  Outcome: Progressing     Problem: Skin  Goal: Decreased wound size/increased tissue granulation at next dressing change  Outcome: Progressing  Goal: Participates in plan/prevention/treatment measures  Outcome: Progressing  Goal: Prevent/manage excess moisture  Outcome: Progressing  Goal: Prevent/minimize sheer/friction injuries  Outcome: Progressing  Goal: Promote/optimize nutrition  Outcome: Progressing  Goal: Promote skin healing  Outcome: Progressing     Problem: Safety - Adult  Goal: Free from fall injury  Outcome: Progressing     Problem: Discharge Planning  Goal: Discharge to home or other facility with appropriate resources  Outcome: Progressing     Problem: Chronic Conditions and Co-morbidities  Goal: Patient's chronic conditions and co-morbidity symptoms are monitored and maintained or improved  Outcome: Progressing

## 2024-11-10 NOTE — PROGRESS NOTES
Tommy Richmond is a 89 y.o. male on day 10 of admission presenting with Hemopneumothorax on left.    FAWAD requested  DSC initiate auth for discharge to [facility].       Precert Status: Approved  [facility] Auth ID # [id number]  Dates: 11/10/2024 - 2024    [facility]  [address] Tommy [address] MRN [id number]   1935  [employer] # [id number]  [facility]     Facility will have bed . Sw to arrange dc transport via [facility]. Transport requested for 11am, FAWAD requested  [facility] from team. Transport confirmed for 11am pending facility acceptance. FAWAD spoke with son Yury to update on discharge. He is aware of arranged discharge for  @11am. Sw informed  facility still needs to confirm. Informed staff will call if discharge needs cancelled for any reason. If not, patient and son updated on discharge plan to [facility]. IMM completed.

## 2024-11-10 NOTE — DISCHARGE INSTRUCTIONS
please call [phone] to schedule your follow up appointment with orthopedics Dr. Worthington as needed for unimproving lower back pain with Dr. Timo Worthington.     Per geriatrics in house:  Continue to hold Losartan-hydrochlorothiazide as Bps have been at goal for age while inpatient. If becomes hypertensive at SNF, would only restart Losartan as pt is already on Lasix to minimize diuretics. Continue to hold oxybutynin at discharge w/ anticholinergic activity. If issues arise at SNF, recommend mirabegron and urology consult. New seroquel should be given for three days and then stopped. Should see PCP post SNF discharge for continued medical management.     If you have any questions or concerns related to your trauma, surgery, or hospitalization, please do not hesitate to call our outpatient clinic nurse coordinator at [phone]. The nurse will get back to you within 48-72 hours. If you feel it is an emergency please proceed to your nearest Emergency Room. Call as needed for follow up regarding chest injuries.

## 2024-11-10 NOTE — PROGRESS NOTES
[facility]  TRAUMA SERVICE - PROGRESS NOTE    Patient Name: Tommy Richmond  MRN: [id number]  Admit Date: 1031  : 1935  AGE: 89 y.o.   GENDER: male  8027/8027-A  ==============================================================================  MECHANISM OF INJURY:   89-year-old male who presented after a ground-level fall   LOC (yes/no?): No  Anticoagulant / Anti-platelet Rx? (for what dx?): No  Referring Facility Name (N/A for scene EMR run): [facility]    INJURIES:   Left Hemo/Pneumothorax  L1 Compression fracture  Fracture of left ribs 5-7     OTHER MEDICAL PROBLEMS:  Recurrent UTIs with stage III Kidney disease  HTN  Glaucoma  BPH     INCIDENTAL FINDINGS:  Complex kidney cyst  Cholelithiasis    ==============================================================================  TODAY'S ASSESSMENT AND PLAN OF CARE:    #L hemo/pneumothorax, L 5-7 rib fx. Pulm edema  - s/p pigtail, removed , site now JOSY  - daily CXR  - weaned to room air, O2 as needed to maintain SpO2 >92%  - encourage ICS use  - restart home 20 mg lasix, repeat CXR tomorrow  - multimodal pain regimen with sched tylenol oxy 2.5/5q6 from q4 (deferring tramadol per kobi recs to stick with one opioid), 5 mg oxy at night, lidoderm. Cont wean opioids daily  - PT/OT: moderate intensity    #L1 compression fx  -ortho spine s/o:   - OOBAT, no restrictions, Xrs stable, fu as needed Ander    #acute mixed delirium, resolving  - geriatrics following during weekdays  - Seroquel and melatonin at night, make antipsychotic as needed at DC    #UTI, hx recurrent UTIs with stage III Kidney disease   - 7 days of ceftriaxone completed     ## remainder comorbids  - cont home metop in form of 12.5 mg bid tartrate instead of 25 mg succinate, flomax, b12  - holding home finasteride, losartan/hydrochlorothiazide for normotension. Geriatrics can weigh in tomorrow on possible reintroduction     #FEN/GI/  - regular diet, order  1:1 assist with feeding for baseline loss of fine motor skills  - voiding well  - bowel regimen w/ miralax, senna, colace, dulcolax as needed  - recheck lytes/creat tomorrow    #DVT PPX  - SCDs, Lovenox    Dispo: continue care on trauma floor, medically clear SNF. Leaving 11A tomorrow    Patient and plan discussed with attending, Dr Bergman.    Thuan Thibodeaux PA-C  Trauma, Critical Care, and Acute Care Surgery  Floor: [id number] TSICU: [id number]  ==============================================================================  CHIEF COMPLAINT / OVERNIGHT EVENTS:   No adverse events overnight. Pain controlled. No shortness of breath. Slept, but needs help eating as says has experience fine motor skill issues with hands for years    MEDICAL HISTORY / ROS:  Admission history and ROS reviewed. Pertinent changes as follows:    PHYSICAL EXAM:  Heart Rate:  [65-83]   Temp:  [36.1 °C (97 °F)-36.7 °C (98.1 °F)]   Resp:  [17]   BP: (128-179)/(70-84)   SpO2:  [94 %-98 %]   Physical Exam      Physical Exam:   GEN: No acute distress  SKIN: Warm and dry  CARDIO: Rate controlled rhythm  RESP: Nml resp rate 1L NC without resp distress, relatively clear throughout. Prior L pigtail chest tube site healed to skin surface. L chest wall ecchymosis, appropriately tender  GI: Soft, NT, ND  : deferred  MSK: PIÑA  EXTREM: No pitting edema  NEURO: Alert and oriented today but has had intermittent confusion during stay, SILTx4, 4/5 strength throughout  PSYCH: Nml affect    IMAGING SUMMARY:  (summary of new imaging findings, not a copy of dictation)  CXR with similar edema/L effusion as prior    LABS:  Results from last 7 days   Lab Units 11/07/24  0630 11/04/24  0654   WBC AUTO x10*3/uL 9.5 6.9   HEMOGLOBIN g/dL 9.2* 8.5*   HEMATOCRIT % 29.1* 26.9*   PLATELETS AUTO x10*3/uL 258 230         Results from last 7 days   Lab Units 11/07/24  0630 11/04/24  0654   SODIUM mmol/L 143 141   POTASSIUM mmol/L 3.5 3.9   CHLORIDE mmol/L 109* 109*   CO2 mmol/L 25  25   BUN mg/dL 28* 33*   CREATININE mg/dL 1.08 1.05   CALCIUM mg/dL 7.7* 7.5*   GLUCOSE mg/dL 81 85                 I have reviewed all medications, laboratory results, and imaging pertinent for today's encounter.

## 2024-11-10 NOTE — CARE PLAN
The patient's goals for the shift include      The clinical goals for the shift include pt will remain free from falls throughout shift

## 2024-11-11 ENCOUNTER — APPOINTMENT (OUTPATIENT)
Dept: RADIOLOGY | Facility: HOSPITAL | Age: 89
DRG: 183 | End: 2024-11-11
Payer: MEDICARE

## 2024-11-11 LAB
ALBUMIN SERPL BCP-MCNC: 2.1 G/DL (ref 3.4–5)
ANION GAP SERPL CALC-SCNC: 9 MMOL/L (ref 10–20)
BUN SERPL-MCNC: 31 MG/DL (ref 6–23)
CALCIUM SERPL-MCNC: 7.8 MG/DL (ref 8.6–10.6)
CHLORIDE SERPL-SCNC: 110 MMOL/L (ref 98–107)
CO2 SERPL-SCNC: 27 MMOL/L (ref 21–32)
CREAT SERPL-MCNC: 1.04 MG/DL (ref 0.5–1.3)
EGFRCR SERPLBLD CKD-EPI 2021: 69 ML/MIN/1.73M*2
GLUCOSE SERPL-MCNC: 87 MG/DL (ref 74–99)
MAGNESIUM SERPL-MCNC: 2.11 MG/DL (ref 1.6–2.4)
PHOSPHATE SERPL-MCNC: 3.6 MG/DL (ref 2.5–4.9)
POTASSIUM SERPL-SCNC: 4.6 MMOL/L (ref 3.5–5.3)
SARS-COV-2 RNA RESP QL NAA+PROBE: NOT DETECTED
SODIUM SERPL-SCNC: 141 MMOL/L (ref 136–145)

## 2024-11-11 PROCEDURE — 87635 SARS-COV-2 COVID-19 AMP PRB: CPT

## 2024-11-11 PROCEDURE — 2500000001 HC RX 250 WO HCPCS SELF ADMINISTERED DRUGS (ALT 637 FOR MEDICARE OP)

## 2024-11-11 PROCEDURE — 2500000004 HC RX 250 GENERAL PHARMACY W/ HCPCS (ALT 636 FOR OP/ED)

## 2024-11-11 PROCEDURE — 83735 ASSAY OF MAGNESIUM: CPT | Performed by: PHYSICIAN ASSISTANT

## 2024-11-11 PROCEDURE — 2500000004 HC RX 250 GENERAL PHARMACY W/ HCPCS (ALT 636 FOR OP/ED): Performed by: STUDENT IN AN ORGANIZED HEALTH CARE EDUCATION/TRAINING PROGRAM

## 2024-11-11 PROCEDURE — 71045 X-RAY EXAM CHEST 1 VIEW: CPT | Performed by: RADIOLOGY

## 2024-11-11 PROCEDURE — 2500000005 HC RX 250 GENERAL PHARMACY W/O HCPCS

## 2024-11-11 PROCEDURE — 97530 THERAPEUTIC ACTIVITIES: CPT | Mod: GP,CQ

## 2024-11-11 PROCEDURE — 2500000002 HC RX 250 W HCPCS SELF ADMINISTERED DRUGS (ALT 637 FOR MEDICARE OP, ALT 636 FOR OP/ED)

## 2024-11-11 PROCEDURE — 99231 SBSQ HOSP IP/OBS SF/LOW 25: CPT

## 2024-11-11 PROCEDURE — 2500000005 HC RX 250 GENERAL PHARMACY W/O HCPCS: Performed by: STUDENT IN AN ORGANIZED HEALTH CARE EDUCATION/TRAINING PROGRAM

## 2024-11-11 PROCEDURE — 97530 THERAPEUTIC ACTIVITIES: CPT | Mod: GO

## 2024-11-11 PROCEDURE — 36415 COLL VENOUS BLD VENIPUNCTURE: CPT | Performed by: PHYSICIAN ASSISTANT

## 2024-11-11 PROCEDURE — 99232 SBSQ HOSP IP/OBS MODERATE 35: CPT

## 2024-11-11 PROCEDURE — 71045 X-RAY EXAM CHEST 1 VIEW: CPT

## 2024-11-11 PROCEDURE — 80069 RENAL FUNCTION PANEL: CPT | Performed by: PHYSICIAN ASSISTANT

## 2024-11-11 PROCEDURE — 2500000001 HC RX 250 WO HCPCS SELF ADMINISTERED DRUGS (ALT 637 FOR MEDICARE OP): Performed by: PHYSICIAN ASSISTANT

## 2024-11-11 PROCEDURE — 1100000001 HC PRIVATE ROOM DAILY

## 2024-11-11 RX ORDER — OXYCODONE HYDROCHLORIDE 5 MG/1
5 TABLET ORAL EVERY 6 HOURS PRN
Qty: 12 TABLET | Refills: 0 | Status: CANCELLED | OUTPATIENT
Start: 2024-11-11 | End: 2024-11-14

## 2024-11-11 RX ORDER — DOCUSATE SODIUM 100 MG/1
100 CAPSULE, LIQUID FILLED ORAL 2 TIMES DAILY
Qty: 28 CAPSULE | Refills: 0 | Status: CANCELLED | OUTPATIENT
Start: 2024-11-11 | End: 2024-11-25

## 2024-11-11 RX ADMIN — CYANOCOBALAMIN TAB 1000 MCG 1000 MCG: 1000 TAB at 09:30

## 2024-11-11 RX ADMIN — ACETAMINOPHEN 975 MG: 325 TABLET ORAL at 15:43

## 2024-11-11 RX ADMIN — LIDOCAINE 1 PATCH: 4 PATCH TOPICAL at 09:30

## 2024-11-11 RX ADMIN — ENOXAPARIN SODIUM 30 MG: 30 INJECTION SUBCUTANEOUS at 09:30

## 2024-11-11 RX ADMIN — FUROSEMIDE 20 MG: 20 TABLET ORAL at 09:30

## 2024-11-11 RX ADMIN — OXYCODONE HYDROCHLORIDE 5 MG: 5 TABLET ORAL at 20:40

## 2024-11-11 RX ADMIN — TAMSULOSIN HYDROCHLORIDE 0.4 MG: 0.4 CAPSULE ORAL at 20:38

## 2024-11-11 RX ADMIN — SENNOSIDES 8.6 MG: 8.6 TABLET, FILM COATED ORAL at 20:38

## 2024-11-11 RX ADMIN — METOPROLOL TARTRATE 12.5 MG: 25 TABLET, FILM COATED ORAL at 09:30

## 2024-11-11 RX ADMIN — QUETIAPINE FUMARATE 25 MG: 25 TABLET ORAL at 20:39

## 2024-11-11 RX ADMIN — ENOXAPARIN SODIUM 30 MG: 30 INJECTION SUBCUTANEOUS at 20:38

## 2024-11-11 RX ADMIN — DOCUSATE SODIUM 100 MG: 100 CAPSULE, LIQUID FILLED ORAL at 20:39

## 2024-11-11 RX ADMIN — METOPROLOL TARTRATE 12.5 MG: 25 TABLET, FILM COATED ORAL at 20:39

## 2024-11-11 RX ADMIN — Medication 9 MG: at 20:38

## 2024-11-11 RX ADMIN — ACETAMINOPHEN 975 MG: 325 TABLET ORAL at 09:30

## 2024-11-11 RX ADMIN — POLYETHYLENE GLYCOL 3350 17 G: 17 POWDER, FOR SOLUTION ORAL at 09:30

## 2024-11-11 ASSESSMENT — COGNITIVE AND FUNCTIONAL STATUS - GENERAL
MOVING FROM LYING ON BACK TO SITTING ON SIDE OF FLAT BED WITH BEDRAILS: A LOT
DRESSING REGULAR UPPER BODY CLOTHING: A LITTLE
HELP NEEDED FOR BATHING: A LOT
EATING MEALS: A LITTLE
MOVING TO AND FROM BED TO CHAIR: A LOT
MOBILITY SCORE: 11
WALKING IN HOSPITAL ROOM: A LOT
STANDING UP FROM CHAIR USING ARMS: A LOT
STANDING UP FROM CHAIR USING ARMS: A LOT
CLIMB 3 TO 5 STEPS WITH RAILING: TOTAL
TURNING FROM BACK TO SIDE WHILE IN FLAT BAD: A LOT
HELP NEEDED FOR BATHING: A LOT
DAILY ACTIVITIY SCORE: 15
TOILETING: A LOT
DRESSING REGULAR UPPER BODY CLOTHING: A LITTLE
WALKING IN HOSPITAL ROOM: TOTAL
MOVING FROM LYING ON BACK TO SITTING ON SIDE OF FLAT BED WITH BEDRAILS: A LOT
DRESSING REGULAR LOWER BODY CLOTHING: TOTAL
PERSONAL GROOMING: A LITTLE
PERSONAL GROOMING: A LITTLE
TOILETING: A LITTLE
CLIMB 3 TO 5 STEPS WITH RAILING: TOTAL
DAILY ACTIVITIY SCORE: 16
MOBILITY SCORE: 10
TURNING FROM BACK TO SIDE WHILE IN FLAT BAD: A LOT
DRESSING REGULAR LOWER BODY CLOTHING: A LOT
MOVING TO AND FROM BED TO CHAIR: A LOT

## 2024-11-11 ASSESSMENT — PAIN SCALES - GENERAL
PAINLEVEL_OUTOF10: 0 - NO PAIN
PAINLEVEL_OUTOF10: 0 - NO PAIN
PAINLEVEL_OUTOF10: 9
PAINLEVEL_OUTOF10: 7

## 2024-11-11 ASSESSMENT — PAIN - FUNCTIONAL ASSESSMENT
PAIN_FUNCTIONAL_ASSESSMENT: 0-10

## 2024-11-11 NOTE — PROGRESS NOTES
Patient unable to discharge. Per the facility, his bed is not available at this time. SW will continue to follow to facilitate discharge plan.       JOHN Roldan

## 2024-11-11 NOTE — PROGRESS NOTES
Transitional Care Coordinator Note: Patient discussed in morning rounds, per medical team (trauma) patient is medically ready. Discharge dispo: Plan for patient to discharge to SNF [facility], pending bed availability. TCC and SW spoke with Mercedes liaison from facility who stated facility should have a bed available tomorrow 11/12. Updated clinicals sent to facility per facility request. TCC placed request in TCC/Therapy for updated notes.       Cele Avila RN BSN   Transitional Care Coordinator

## 2024-11-11 NOTE — PROGRESS NOTES
Occupational Therapy    Occupational Therapy Treatment    Name: Tommy Richmond  MRN: [id number]  : 1935  Date: 24  Room: [address] [place], [place] [address]A      Time Calculation  Start Time: 1216  Stop Time: 1228  Time Calculation (min): 12 min    Assessment:  Evaluation/Treatment Tolerance: Patient limited by fatigue  Medical Staff Made Aware: Yes  End of Session Communication: Bedside nurse, PCT/NA/CTA  End of Session Patient Position: Bed, 3 rail up, Alarm on  Plan:  Treatment Interventions: ADL retraining, Functional transfer training, UE strengthening/ROM, Endurance training, Patient/family training, Equipment evaluation/education, Neuromuscular reeducation, Fine motor coordination activities, Compensatory technique education  OT Frequency: 3 times per week  OT Discharge Recommendations: Moderate intensity level of continued care  Equipment Recommended upon Discharge: Wheelchair, Lift (hospital bed)  OT Recommended Transfer Status: Assist of 2  OT - OK to Discharge: Yes    Subjective   General:  OT Last Visit  OT Received On: 24  Prior to Session Communication: Bedside nurse  Patient Position Received: Up in chair, Alarm off, not on at start of session  Family/Caregiver Present: No  General Comment: Pt pleasant and agreeable to OT session, reports he was just panicking because he felt like he couldn't breath, RN present and placed pt on 4.5 L O2. Cleaered for OT session.   Precautions:  Medical Precautions: Fall precautions, Spinal precautions, Oxygen therapy device and L/min (4.5L)  Vitals:  Vital Signs (Past 2hrs)        Date/Time Vitals Session Patient Position Pulse Resp SpO2 BP MAP (mmHg)    24 1130 During PT  --  75  --  93 %  106/60  --     24 1138 --  --  75  18  93 %  106/60  76                   Lines/Tubes/Drains:  External Urinary Catheter Male (Active)   Number of days: 10       Cognition:  Overall Cognitive Status: Within Functional Limits  Orientation Level: Oriented X4    Pain  Assessment:  Pain Assessment  Pain Assessment: 0-10  0-10 (Numeric) Pain Score: 9  Pain Type: Acute pain  Pain Location: Shoulder  Pain Orientation: Left, Posterior  Pain Interventions: Repositioned  Response to Interventions: Best at rest     Objective   Bed Mobility/Transfers:   Bed Mobility 1  Bed Mobility 1: Sitting to supine  Level of Assistance 1: Moderate assistance  Bed Mobility Comments 1: Cues for spinal precautions, Mod A for LEs and pivoting trunk    Transfers  Transfer: Yes  Transfer 1  Transfer From 1: Chair with arms to  Transfer to 1: Bed  Technique 1: Stand pivot  Transfer Device 1: Walker  Transfer Level of Assistance 1: Maximum assistance  Trials/Comments 1: Max A for lifting and pivoting, cues for positioing and to increase IND.  Therapy/Activity:      Therapeutic Activity  Therapeutic Activity Performed: Yes  Therapeutic Activity 1: Functional transfer chair to bed without a device, Max A for lifting to stand and Max A for succeessful pivot to chair, cues for body positioning and increased IND.  Therapeutic Activity 2: Static sitting EOB for approx 3 minutes, cues for body posture for precautions and balance.    Outcome Measures:  [facility] Daily Activity  Putting on and taking off regular lower body clothing: Total  Bathing (including washing, rinsing, drying): A lot  Putting on and taking off regular upper body clothing: A little  Toileting, which includes using toilet, bedpan or urinal: A lot  Taking care of personal grooming such as brushing teeth: A little  Eating Meals: None  Daily Activity - Total Score: 15     Education Documentation  Body Mechanics, taught by Emeterio Horne OT at 11/11/2024 12:46 PM.  Learner: Patient  Readiness: Acceptance  Method: Explanation, Demonstration  Response: Verbalizes Understanding, Needs Reinforcement    Precautions, taught by Emeterio Horne OT at 11/11/2024 12:46 PM.  Learner: Patient  Readiness: Acceptance  Method: Explanation, Demonstration  Response:  Verbalizes Understanding, Needs Reinforcement    ADL Training, taught by Emeterio Horne OT at 11/11/2024 12:46 PM.  Learner: Patient  Readiness: Acceptance  Method: Explanation, Demonstration  Response: Verbalizes Understanding, Needs Reinforcement    Education Comments  No comments found.    Goals:  Encounter Problems       Encounter Problems (Active)       ADLs       Patient will perform UB and LB bathing with minimal assist  level of assistance. (Progressing)       Start:  11/01/24    Expected End:  11/22/24            Patient with complete upper body dressing with independent level of assistance donning and doffing all UE clothes with no adaptive equipment while edge of bed  (Progressing)       Start:  11/01/24    Expected End:  11/22/24            Patient with complete lower body dressing with stand by assist level of assistance donning and doffing all LE clothes  with no adaptive equipment while edge of bed  (Progressing)       Start:  11/01/24    Expected End:  11/22/24            Patient will complete daily grooming tasks brushing teeth and washing face/hair with independent level of assistance and PRN adaptive equipment while edge of bed . (Progressing)       Start:  11/01/24    Expected End:  11/22/24               COGNITION/SAFETY       Patient will recall and adhere to spinal precautions during all functional mobility/ADL tasks in order to demonstrate improved understanding and promote healing post op (Progressing)       Start:  11/01/24    Expected End:  11/22/24               TRANSFERS       Patient will perform bed mobility stand by assist level of assistance in order to improve safety and independence with mobility (Progressing)       Start:  11/01/24    Expected End:  11/22/24            Patient will complete functional transfer to chair with least restrictive device with minimal assist  level of assistance. (Progressing)       Start:  11/01/24    Expected End:  11/22/24 11/11/24 at  12:47 PM   Emeterio Horne, OT   [phone]

## 2024-11-11 NOTE — PROGRESS NOTES
Physical Therapy    Physical Therapy Treatment    Patient Name: Tommy Richmond  MRN: [id number]  Department: [place] [address]  Room: 80/8027-A  Today's Date: 11/11/2024  Time Calculation  Start Time: 1130  Stop Time: 1154  Time Calculation (min): 24 min         Assessment/Plan   PT Assessment  PT Assessment Results: Decreased strength, Decreased endurance, Impaired balance, Decreased mobility  Rehab Prognosis: Good  Barriers to Discharge: Non ambulatory on eval. HHA intermittently, h/o falls  Evaluation/Treatment Tolerance: Patient tolerated treatment well  Medical Staff Made Aware: Yes  End of Session Communication: PCT/NA/CTA  Assessment Comment: Pt offers good effort despite anxious behaviors throughout session. Remains appropriate for mod intensity therapy  End of Session Patient Position: Up in chair  PT Plan  Inpatient/Swing Bed or Outpatient: Inpatient  PT Plan  Treatment/Interventions: Bed mobility, Transfer training, Gait training, Stair training, Balance training, Neuromuscular re-education, Strengthening, Endurance training, Therapeutic exercise, Therapeutic activity, Home exercise program, Postural re-education  PT Plan: Ongoing PT  PT Frequency: 5 times per week  PT Discharge Recommendations: Moderate intensity level of continued care  Equipment Recommended upon Discharge: Wheelchair, Lift (hospital bed)  PT Recommended Transfer Status: Total assist  PT - OK to Discharge: Yes (eval complete and discharge recommendations made)      General Visit Information:   PT  Visit  PT Received On: 11/11/24  Response to Previous Treatment: Patient with no complaints from previous session.  General  Prior to Session Communication: Bedside nurse  Patient Position Received: Bed, 3 rail up  General Comment: Pt supine in bed, agreeable to therapy, motivated to get OOB  Per handoff with RN, pt is appropriate for therapy, vitals are stable and pain is controlled. Other concerns prior to tx are: none    Subjective    Precautions:  Precautions  Medical Precautions: Fall precautions, Spinal precautions    Vital Signs (Past 2hrs)        Date/Time Vitals Session Patient Position Pulse Resp SpO2 BP MAP (mmHg)    11/11/24 1130 During PT  --  75  --  93 %  106/60  --                  Objective   Pain:  Pain Assessment  Pain Assessment: 0-10  0-10 (Numeric) Pain Score: 0 - No pain  Cognition:  Cognition  Overall Cognitive Status: Within Functional Limits  Orientation Level: Oriented X4    Treatments:     Therapeutic Activity  Therapeutic Activity Performed: Yes  Therapeutic Activity 1: Sitting EOB with SBA, fwd and lateral seated scoot at EOB with modA, max cues    Bed Mobility  Bed Mobility: Yes  Bed Mobility 1  Bed Mobility 1: Supine to sitting  Level of Assistance 1: Maximum assistance, Maximum verbal cues, Maximum tactile cues  Bed Mobility Comments 1: HOB flat, use of logroll technique    Ambulation/Gait Training  Ambulation/Gait Training Performed: No  Transfers  Transfer: Yes  Transfer 1  Transfer From 1: Sit to, Stand to  Transfer to 1: Sit  Technique 1: Sit to stand, Stand to sit  Transfer Device 1: Gait belt  Transfer Level of Assistance 1: Maximum assistance  Trials/Comments 1: significantly flexed posture  Transfers 2  Transfer From 2: Bed to  Transfer to 2: Chair with arms  Technique 2: Stand pivot  Transfer Device 2: Gait belt  Transfer Level of Assistance 2: Maximum assistance, Maximum verbal cues    Outcome Measures:     [facility] Basic Mobility  Turning from your back to your side while in a flat bed without using bedrails: A lot  Moving from lying on your back to sitting on the side of a flat bed without using bedrails: A lot  Moving to and from bed to chair (including a wheelchair): A lot  Standing up from a chair using your arms (e.g. wheelchair or bedside chair): A lot  To walk in hospital room: Total  Climbing 3-5 steps with railing: Total  Basic Mobility - Total Score: 10    Education Documentation  Body Mechanics,  taught by Natalie Vasques PTA at 11/11/2024 12:21 PM.  Learner: Patient  Readiness: Acceptance  Method: Explanation, Demonstration  Response: Verbalizes Understanding, Demonstrated Understanding  Comment: precautions, logroll technique, safe transfers    Precautions, taught by Natalie Vasques PTA at 11/11/2024 12:21 PM.  Learner: Patient  Readiness: Acceptance  Method: Explanation, Demonstration  Response: Verbalizes Understanding, Demonstrated Understanding  Comment: precautions, logroll technique, safe transfers    Mobility Training, taught by Natalie Vasques PTA at 11/11/2024 12:21 PM.  Learner: Patient  Readiness: Acceptance  Method: Explanation, Demonstration  Response: Verbalizes Understanding, Demonstrated Understanding  Comment: precautions, logroll technique, safe transfers    Education Comments  No comments found.        OP EDUCATION:       Encounter Problems       Encounter Problems (Active)       Balance       STG - Maintains dynamic standing balance with upper extremity support on LRAD with Min A x1  (Progressing)       Start:  11/01/24    Expected End:  11/22/24            STG - Maintains dynamic sitting balance without upper extremity support with Sup (Progressing)       Start:  11/01/24    Expected End:  11/22/24               Mobility       STG - Patient will ambulate x20 ft with LRAD with Min A x1 (Progressing)       Start:  11/01/24    Expected End:  11/22/24               PT Transfers       STG - Patient will perform bed mobility with Min A (Progressing)       Start:  11/01/24    Expected End:  11/22/24            STG - Patient will transfer sit to and from stand with Min A using LRAD (Progressing)       Start:  11/01/24    Expected End:  11/22/24                 SHEILA Botello

## 2024-11-11 NOTE — CARE PLAN
The patient's goals for the shift include safety     The clinical goals for the shift include pt will remain free from falls throughout shift      Problem: Skin  Goal: Decreased wound size/increased tissue granulation at next dressing change  Outcome: Progressing  Goal: Participates in plan/prevention/treatment measures  Outcome: Progressing  Goal: Prevent/manage excess moisture  Outcome: Progressing  Goal: Prevent/minimize sheer/friction injuries  Outcome: Progressing  Goal: Promote/optimize nutrition  Outcome: Progressing  Goal: Promote skin healing  Outcome: Progressing     Problem: Pain  Goal: Takes deep breaths with improved pain control throughout the shift  Outcome: Progressing  Goal: Turns in bed with improved pain control throughout the shift  Outcome: Progressing  Goal: Walks with improved pain control throughout the shift  Outcome: Progressing  Goal: Performs ADL's with improved pain control throughout shift  Outcome: Progressing  Goal: Participates in PT with improved pain control throughout the shift  Outcome: Progressing  Goal: Free from opioid side effects throughout the shift  Outcome: Progressing  Goal: Free from acute confusion related to pain meds throughout the shift  Outcome: Progressing     Problem: Safety - Adult  Goal: Free from fall injury  Outcome: Progressing     Problem: Discharge Planning  Goal: Discharge to home or other facility with appropriate resources  Outcome: Progressing     Problem: Chronic Conditions and Co-morbidities  Goal: Patient's chronic conditions and co-morbidity symptoms are monitored and maintained or improved  Outcome: Progressing     Problem: Fall/Injury  Goal: Not fall by end of shift  Outcome: Progressing  Goal: Be free from injury by end of the shift  Outcome: Progressing  Goal: Verbalize understanding of personal risk factors for fall in the hospital  Outcome: Progressing  Goal: Verbalize understanding of risk factor reduction measures to prevent injury from  fall in the home  Outcome: Progressing  Goal: Use assistive devices by end of the shift  Outcome: Progressing  Goal: Pace activities to prevent fatigue by end of the shift  Outcome: Progressing

## 2024-11-11 NOTE — PROGRESS NOTES
[facility]  TRAUMA SERVICE - PROGRESS NOTE    Patient Name: Tommy Richmond  MRN: [id number]  Admit Date: 1031  : 1935  AGE: 89 y.o.   GENDER: male  8027/8027-A  ==============================================================================  MECHANISM OF INJURY:   89-year-old male who presented after a ground-level fall   LOC (yes/no?): No  Anticoagulant / Anti-platelet Rx? (for what dx?): No  Referring Facility Name (N/A for scene EMR run): [facility]    INJURIES:   Left Hemo/Pneumothorax  L1 Compression fracture  Fracture of left ribs 5-7     OTHER MEDICAL PROBLEMS:  Recurrent UTIs with stage III Kidney disease  HTN  Glaucoma  BPH     INCIDENTAL FINDINGS:  Complex kidney cyst  Cholelithiasis    ==============================================================================  TODAY'S ASSESSMENT AND PLAN OF CARE:    #L hemo/pneumothorax, L 5-7 rib fx. Pulm edema  - s/p pigtail, removed , site now JOSY  - daily CXR  - weaned to room air, O2 as needed to maintain SpO2 >92%  - encourage ICS use  - restart home 20 mg lasix, repeat CXR tomorrow  - multimodal pain regimen with sched tylenol oxy 2.5/5q6 from q4 (deferring tramadol per kobi recs to stick with one opioid), 5 mg oxy at night, lidoderm. Cont wean opioids daily  - PT/OT: moderate intensity    #L1 compression fx  -ortho spine s/o:   - OOBAT, no restrictions, Xrs stable, fu as needed Ander    #acute mixed delirium, resolving  - geriatrics following during weekdays  - Seroquel and melatonin at night, make antipsychotic as needed at DC    #UTI, hx recurrent UTIs with stage III Kidney disease   - 7 days of ceftriaxone completed     ## remainder comorbids  - cont home metop in form of 12.5 mg bid tartrate instead of 25 mg succinate, flomax, b12  - holding home finasteride, losartan/hydrochlorothiazide for normotension. Geriatrics can weigh in tomorrow on possible reintroduction     #FEN/GI/  - regular diet, order  1:1 assist with feeding for baseline loss of fine motor skills  - voiding well  - bowel regimen w/ miralax, senna, colace, dulcolax as needed  - recheck lytes/creat tomorrow    #DVT PPX  - SCDs, Lovenox    Dispo: Medically clear SNF. Plan for discharge 11/12 assuming bed availability at facility    Patient and plan discussed with attending, Tamra Lazaro, CNP  Trauma Surgery  Floor: [id number] TICU: [id number]  Pager: [phone]    ==============================================================================  CHIEF COMPLAINT / OVERNIGHT EVENTS:   No OVN events    MEDICAL HISTORY / ROS:  Admission history and ROS reviewed. Pertinent changes as follows:    PHYSICAL EXAM:  Heart Rate:  [73-82]   Temp:  [36.4 °C (97.6 °F)-37.1 °C (98.8 °F)]   Resp:  [18-19]   BP: (106-130)/(50-73)   SpO2:  [92 %-95 %]   Physical Exam      Physical Exam:   GEN: No acute distress  SKIN: Warm and dry  CARDIO: Rate controlled rhythm  RESP: Nml resp rate 1L NC without resp distress, relatively clear throughout. Prior L pigtail chest tube site healed to skin surface. L chest wall ecchymosis, appropriately tender  GI: Soft, NT, ND  : deferred  MSK: PIÑA  EXTREM: No pitting edema  NEURO: Alert and oriented today but has had intermittent confusion during stay, SILTx4, 4/5 strength throughout  PSYCH: Nml affect    IMAGING SUMMARY:  (summary of new imaging findings, not a copy of dictation)  CXR with similar edema/L effusion as prior    LABS:  Results from last 7 days   Lab Units 11/07/24  0630   WBC AUTO x10*3/uL 9.5   HEMOGLOBIN g/dL 9.2*   HEMATOCRIT % 29.1*   PLATELETS AUTO x10*3/uL 258         Results from last 7 days   Lab Units 11/11/24  0803 11/07/24  0630   SODIUM mmol/L 141 143   POTASSIUM mmol/L 4.6 3.5   CHLORIDE mmol/L 110* 109*   CO2 mmol/L 27 25   BUN mg/dL 31* 28*   CREATININE mg/dL 1.04 1.08   CALCIUM mg/dL 7.8* 7.7*   GLUCOSE mg/dL 87 81                 I have reviewed all medications, laboratory results, and imaging  pertinent for today's encounter.

## 2024-11-11 NOTE — PROGRESS NOTES
Subjective   No acute events overnight. Per nursing, pt is doing better, was calm and slept overnight. Planning for discharge this morning. Pt is alert and oriented to person, place, and time this am, states he feels like he is back to himself, and wants to regain his strength for rehab.        Objective     Current Facility-Administered Medications   Medication Dose Route Frequency Provider Last Rate Last Admin    acetaminophen (Tylenol) tablet 975 mg  975 mg oral q8h Roger Gaytan MD   975 mg at 11/10/24 1648    bisacodyl (Dulcolax) suppository 10 mg  10 mg rectal Daily PRN Briseyda Ames PA-C        cyanocobalamin (Vitamin B-12) tablet 1,000 mcg  1,000 mcg oral Daily Roger Gaytan MD   1,000 mcg at 11/10/24 0922    docusate sodium (Colace) capsule 100 mg  100 mg oral BID Thuan Thibodeaux PA-C   100 mg at 11/10/24 1648    enoxaparin (Lovenox) syringe 30 mg  30 mg subcutaneous BID Melba Castillo MD   30 mg at 11/10/24 2040    furosemide (Lasix) tablet 20 mg  20 mg oral Daily Thuan Thibodeaux PA-C   20 mg at 11/10/24 0922    lidocaine 4 % patch 1 patch  1 patch transdermal Daily Melba Castillo MD   1 patch at 11/10/24 0927    melatonin tablet 9 mg  9 mg oral Nightly Roger Gaytan MD   9 mg at 11/10/24 2041    metoprolol tartrate (Lopressor) tablet 12.5 mg  12.5 mg oral BID Albino León MD   12.5 mg at 11/10/24 2041    naloxone (Narcan) injection 0.2 mg  0.2 mg intravenous q5 min PRN Roger Gaytan MD        oxyCODONE (Roxicodone) immediate release tablet 2.5 mg  2.5 mg oral q6h PRN Thuan Thibodeaux PA-C        oxyCODONE (Roxicodone) immediate release tablet 5 mg  5 mg oral Nightly Roger Gaytan MD   5 mg at 11/10/24 2040    oxyCODONE (Roxicodone) immediate release tablet 5 mg  5 mg oral q6h PRN Thuan Thibodeaux PA-C        polyethylene glycol (Glycolax, Miralax) packet 17 g  17 g oral Daily Edmundo Villa DO   17 g at 11/10/24 0922    QUEtiapine (SEROquel) tablet 25 mg  25 mg  oral Nightly Briseyda Ames PA-C   25 mg at 11/10/24 2041    sennosides (Senokot) tablet 8.6 mg  1 tablet oral Nightly Roger Gaytan MD   8.6 mg at 11/10/24 2041    tamsulosin (Flomax) 24 hr capsule 0.4 mg  0.4 mg oral Nightly Roger Gaytan MD   0.4 mg at 11/10/24 2041       Physical Exam  HENT:      Nose: No congestion.      Mouth/Throat:      Mouth: Mucous membranes are moist.   Eyes:      Extraocular Movements: Extraocular movements intact.      Conjunctiva/sclera: Conjunctivae normal.   Cardiovascular:      Rate and Rhythm: Normal rate and regular rhythm.   Pulmonary:      Breath sounds: Normal breath sounds.   Abdominal:      General: There is no distension.      Palpations: Abdomen is soft.   Skin:     Capillary Refill: Capillary refill takes less than 2 seconds.      Findings: Bruising present.      Comments: Bruising over left lower abdomen   Neurological:      Mental Status: He is alert and oriented to person, place, and time.   Psychiatric:         Mood and Affect: Mood normal.         Confusion Assessment Method(CAM) for diagnosis of delirium:    1.  Acute onset or fluctuating course: absent/present: Present  2.  Inattention: absent/present: Absent  3.  Disorganized thinking: absent/present: Absent  4.  Altered level of consciousness: absent/present: Absent  CAM: negative    AT Score For Assessment of Delirium and Cognitive Impairment:    Alertness: 0  Normal(fully alert,but not agitated, throughout assessment)=0  Mild sleepiness for <10 seconds after walking, then normal=0  Clearly abnormal=4  2.  AMT4: 1  No mistakes=0  One mistake=1  Two or more mistakes/untestable=2  3.  Attention: 0  Achieves seven months or more correctly=0  Starts but scores <7 months/ refuses to start=1  Untestable(cannot start because unwell, drowsy, inattentive)=2  4.  Acute: 0  No=0  Yes=4    Total Score: 1  4 or above: Possible delirium +/- cognitive impairment  1-3: Possible cognitive impairment  0: Delirium or  severe cognitive impairment unlikely(but delirium still possible if (4) information incomplete)      Last Recorded Vitals      11/10/2024     4:04 AM 11/10/2024     8:14 AM 11/10/2024    11:00 AM 11/10/2024     3:00 PM 11/10/2024     8:28 PM 11/11/2024    12:17 AM 11/11/2024     4:40 AM   Vitals   Systolic 149 134 128 130 116 114 130   Diastolic 75 70 76 57 60 69 73   Heart Rate 79 67 65 87 79 73 78   Temp 36.7 °C (98.1 °F) 36.1 °C (97 °F) 36.2 °C (97.2 °F) 36 °C (96.8 °F) 36.4 °C (97.6 °F) 36.9 °C (98.4 °F) 36.9 °C (98.4 °F)   Resp 17 17 17 18 19 19 19      Vitals:    10/31/24 0554   Weight: 65.8 kg (145 lb)        Relevant Results  Lab Results   Component Value Date    TSH 1.47 09/26/2024    [id number] 755 11/06/2024    VITD25 41 11/01/2024     Results from last 7 days   Lab Units 11/07/24  0630   WBC AUTO x10*3/uL 9.5   HEMOGLOBIN g/dL 9.2*   HEMATOCRIT % 29.1*   SODIUM mmol/L 143   POTASSIUM mmol/L 3.5   CHLORIDE mmol/L 109*   CREATININE mg/dL 1.08   BUN mg/dL 28*   CO2 mmol/L 25          XR chest 1 view  Narrative: STUDY:  XR CHEST 1 VIEW;  11/10/2024 9:08 am      INDICATION:  Signs/Symptoms:resp failure, comparison.      COMPARISON:  Chest radiograph 11/09/2024, CT chest 11/03/2024      ACCESSION NUMBER(S):  [id number]      ORDERING CLINICIAN:  KRYSTINA SMITH      FINDINGS:  AP radiograph of the chest was provided.      CARDIOMEDIASTINAL SILHOUETTE:  Cardiomediastinal silhouette is stable in size and configuration.  Left heart border is obscured by the left hemithorax opacification.      LUNGS:  No pneumothorax there is similar diffuse left basilar opacification  that silhouettes the left hemidiaphragm, left costophrenic angle, and  left heart border. There is improved right-sided blunting of the  costophrenic angle. Similar perihilar edema..      ABDOMEN:  No remarkable upper abdominal findings.      BONES:  No acute osseous changes.      Impression: 1. Allowing for differences in technique, similar moderate  left-sided  pleural effusion with associated atelectasis/consolidation. Decreased  right-sided pleural effusion.  2. Stable findings of pulmonary interstitial edema.      I personally reviewed the images/study and I agree with the findings  as stated by Ashutosh Garay MD. This study was interpreted at  [facility], [place], [place].      MACRO:  None          Dictation workstation:   [id number]          DATA:  EKG: QTC  Encounter Date: 10/30/24   ECG 12 lead   Result Value    Ventricular Rate 86    Atrial Rate 86    PA Interval 190    QRS Duration 124    QT Interval 366    QTC Calculation(Bazett) 437    P Axis 24    R Axis -42    T Axis -74    QRS Count 14    Q Onset 225    P Onset 130    P Offset 189    T Offset 408    QTC Fredericia 412    Narrative    Normal sinus rhythm  Left axis deviation  Nonspecific intraventricular conduction delay  T wave abnormality, consider inferior ischemia  Abnormal ECG  When compared with ECG of 14-SEP-2024 17:26,  Nonspecific intraventricular conduction delay has replaced Incomplete right bundle branch block  Borderline criteria for Lateral infarct are no longer Present  See ED provider note for full interpretation and clinical correlation  Confirmed by Lucía Galindo ([id number]) on 11/1/2024 12:54:02 PM      Anti-psychotics in 48 hours: Seroquel  Opioids/Benzodiazepines in 48 hours: none  Anticholinergics on board:No  Restraints:No  Indwelling catheters:No  Last BM: 11/8  UO in 24 hours: 500                    Assessment/Plan   Tommy Richmond is a 89 y.o. male on day 11 of admission presenting with Hemopneumothorax on left.    Principal Problem:    Hemopneumothorax on left    89 y.o. year old male, with past medical history relevant for recurrent UTIs, BPH, colon ca s/p resection, poor vision, gait difficulty, htn, presenting for falls w L multiple rib fx, large posterior chest/abd wall hematoma, L ptx/rosalio, s/p L pigtail CT placement 10/31/24,  being seen in geriatric consultation for comanagement, currently being followed for acute delirium. Patient is significantly improved in mentation, back to baseline, ok for discharge today. Recommend limiting anticholinergics for discharge and weaning off of antipsychotics. Detailed plan as below.      Principal Problem:    Hemopneumothorax on left     1. Acute mixed (hyperactive/hypoactive) delirium - RESOLVED  - CAM negative, 4AT: 1  - Delirium improved   - Continue to optimize pain regimen as below.   - Las BM 11/8. Increase sennosides to BID for discharge.   - Continue melatonin 9 mg at 7 pm  - Reduce quetiapine to 12.5 mg for SNF, continue for three days at SNF and then discontinue. Obtain EKG for Qtc.   - Keep patient active during the day, which may help patient sleep at night   - Completed abx w/ CTX on 11/9 for UTI     Please consider the following general measures for minimizing delirium in a hospitalized patient:   -Bright lights during the day, keeps blinds up, switch all lights on   -avoid disturbances at night. Encourage at least 6 hours uninterrupted sleep. Consider d/c 4am vitals check  -avoid benzodiazepines, sedatives. Minimize opioids   -daily orientation to time and place by the staff   -out of bed to chair few hours everyday  - encourage stimulating activities during the day if possible      2. Acute UTI - RESOLVED  - Afebrile, s/p 7 day course of CTX       3. Rib fracture, large abdominal wall hematoma, s/p L CT placement for hemopneumothorax  - Continue Tylenol 975 TID  - Continue lidocaine patches for pain on chest (can trial two given large area of bruising).  - Can continue oxycodone with Narcan    4. BPH  - Continue Tamsulosin 0.4 mg  - Restart Finasteride     5. Hx of urinary incontinence  - Continue to hold oxybutynin at discharge w/ anticholinergic activity. If issues arise at SNF, recommend mirabegron and urology consult.     6. HTN  - Continue to hold Losartan-hydrochlorothiazide as  Bps have been at goal for age while inpatient. If becomes hypertensive at SNF, would only restart Losartan as pt is already on Lasix to minimize diuretics.          4M AGE-FRIENDLY INITIATIVE:  What matters most to patient: Not to fall   Medications: oxycodone, Seroquel   Mentation: CAM negative, 4 AT: 1  Mobility: uses a walker     Geriatric medicine will continue to follow the patient. Thank you for allowing geriatric medicine to be involved in the care of your patient. Geriatric medicine consultation team is available during work hours Monday through Friday. For any emergency issues requiring immediate assistance over the weekend, please page Geriatrics pager [phone].     Pt seen and discussed with Dr. Rajput and recommendations conveyed to primary team.     Mone Crews MD

## 2024-11-12 VITALS
RESPIRATION RATE: 18 BRPM | SYSTOLIC BLOOD PRESSURE: 131 MMHG | HEART RATE: 77 BPM | TEMPERATURE: 98.7 F | DIASTOLIC BLOOD PRESSURE: 71 MMHG | BODY MASS INDEX: 23.4 KG/M2 | OXYGEN SATURATION: 95 % | WEIGHT: 145 LBS

## 2024-11-12 PROCEDURE — 2500000004 HC RX 250 GENERAL PHARMACY W/ HCPCS (ALT 636 FOR OP/ED): Performed by: PHYSICIAN ASSISTANT

## 2024-11-12 PROCEDURE — 2500000005 HC RX 250 GENERAL PHARMACY W/O HCPCS: Performed by: STUDENT IN AN ORGANIZED HEALTH CARE EDUCATION/TRAINING PROGRAM

## 2024-11-12 PROCEDURE — 99232 SBSQ HOSP IP/OBS MODERATE 35: CPT | Performed by: NURSE PRACTITIONER

## 2024-11-12 PROCEDURE — 2500000001 HC RX 250 WO HCPCS SELF ADMINISTERED DRUGS (ALT 637 FOR MEDICARE OP): Performed by: PHYSICIAN ASSISTANT

## 2024-11-12 PROCEDURE — 99238 HOSP IP/OBS DSCHRG MGMT 30/<: CPT | Performed by: PHYSICIAN ASSISTANT

## 2024-11-12 PROCEDURE — 2500000001 HC RX 250 WO HCPCS SELF ADMINISTERED DRUGS (ALT 637 FOR MEDICARE OP)

## 2024-11-12 PROCEDURE — 2500000004 HC RX 250 GENERAL PHARMACY W/ HCPCS (ALT 636 FOR OP/ED)

## 2024-11-12 PROCEDURE — 2500000004 HC RX 250 GENERAL PHARMACY W/ HCPCS (ALT 636 FOR OP/ED): Performed by: STUDENT IN AN ORGANIZED HEALTH CARE EDUCATION/TRAINING PROGRAM

## 2024-11-12 RX ORDER — QUETIAPINE FUMARATE 25 MG/1
12.5 TABLET, FILM COATED ORAL NIGHTLY
Status: DISCONTINUED | OUTPATIENT
Start: 2024-11-12 | End: 2024-11-12 | Stop reason: HOSPADM

## 2024-11-12 RX ORDER — SENNOSIDES 8.6 MG/1
1 TABLET ORAL 2 TIMES DAILY
Start: 2024-11-12

## 2024-11-12 RX ORDER — QUETIAPINE FUMARATE 25 MG/1
12.5 TABLET, FILM COATED ORAL NIGHTLY PRN
Start: 2024-11-12

## 2024-11-12 RX ORDER — FUROSEMIDE 10 MG/ML
20 INJECTION INTRAMUSCULAR; INTRAVENOUS ONCE
Status: COMPLETED | OUTPATIENT
Start: 2024-11-12 | End: 2024-11-12

## 2024-11-12 RX ORDER — SENNOSIDES 8.6 MG/1
1 TABLET ORAL 2 TIMES DAILY
Status: DISCONTINUED | OUTPATIENT
Start: 2024-11-12 | End: 2024-11-12 | Stop reason: HOSPADM

## 2024-11-12 RX ORDER — OXYCODONE HYDROCHLORIDE 5 MG/1
5 TABLET ORAL EVERY 8 HOURS PRN
Qty: 9 TABLET | Refills: 0 | Status: SHIPPED | OUTPATIENT
Start: 2024-11-12 | End: 2024-11-15

## 2024-11-12 RX ORDER — OXYCODONE HYDROCHLORIDE 5 MG/1
5 TABLET ORAL EVERY 8 HOURS PRN
Status: DISCONTINUED | OUTPATIENT
Start: 2024-11-12 | End: 2024-11-12 | Stop reason: HOSPADM

## 2024-11-12 RX ORDER — FINASTERIDE 5 MG/1
5 TABLET, FILM COATED ORAL DAILY
Status: DISCONTINUED | OUTPATIENT
Start: 2024-11-12 | End: 2024-11-12 | Stop reason: HOSPADM

## 2024-11-12 RX ORDER — OXYCODONE HYDROCHLORIDE 5 MG/1
2.5 TABLET ORAL EVERY 8 HOURS PRN
Status: DISCONTINUED | OUTPATIENT
Start: 2024-11-12 | End: 2024-11-12 | Stop reason: HOSPADM

## 2024-11-12 RX ADMIN — OXYCODONE 5 MG: 5 TABLET ORAL at 08:44

## 2024-11-12 RX ADMIN — POLYETHYLENE GLYCOL 3350 17 G: 17 POWDER, FOR SOLUTION ORAL at 08:46

## 2024-11-12 RX ADMIN — FUROSEMIDE 20 MG: 10 INJECTION, SOLUTION INTRAVENOUS at 11:03

## 2024-11-12 RX ADMIN — LIDOCAINE 1 PATCH: 4 PATCH TOPICAL at 08:45

## 2024-11-12 RX ADMIN — DOCUSATE SODIUM 100 MG: 100 CAPSULE, LIQUID FILLED ORAL at 08:43

## 2024-11-12 RX ADMIN — CYANOCOBALAMIN TAB 1000 MCG 1000 MCG: 1000 TAB at 08:44

## 2024-11-12 RX ADMIN — FUROSEMIDE 20 MG: 20 TABLET ORAL at 08:44

## 2024-11-12 RX ADMIN — SENNOSIDES 8.6 MG: 8.6 TABLET, FILM COATED ORAL at 08:44

## 2024-11-12 RX ADMIN — ACETAMINOPHEN 975 MG: 325 TABLET ORAL at 08:44

## 2024-11-12 RX ADMIN — FINASTERIDE 5 MG: 5 TABLET, FILM COATED ORAL at 08:44

## 2024-11-12 RX ADMIN — ACETAMINOPHEN 975 MG: 325 TABLET ORAL at 01:13

## 2024-11-12 RX ADMIN — ENOXAPARIN SODIUM 30 MG: 30 INJECTION SUBCUTANEOUS at 08:45

## 2024-11-12 RX ADMIN — METOPROLOL TARTRATE 12.5 MG: 25 TABLET, FILM COATED ORAL at 08:44

## 2024-11-12 ASSESSMENT — PAIN SCALES - GENERAL: PAINLEVEL_OUTOF10: 0 - NO PAIN

## 2024-11-12 NOTE — PROGRESS NOTES
Transitional Care Coordinator Note: Patient discussed in morning rounds, per medical team (trauma) patient is medically ready. Discharge dispo: Plan for patient to discharge to [facility]. Transportation confirmed for 2pm. [facility] returned call to patient's son Yury Richmond [phone] to update on discharge plan and transportation time. Yury expressed understanding and appreciation of information and is agreeable to discharge plan. IMM completed with Yury during call. Copy placed in chart and at patient's bedside.       Cele Avila RN BSN   Transitional Care Coordinator

## 2024-11-12 NOTE — NURSING NOTE
Pt discharged to SNF per order transferred by [facility] via stretcher. All belongings with patient. Ivs removed. Report called and given to intake nurse at facility. Patient son aware of patient discharge.

## 2024-11-12 NOTE — CARE PLAN
The patient's goals for the shift include safety     The clinical goals for the shift include remain free from falls throughout shift      Problem: Safety - Adult  Goal: Free from fall injury  Outcome: Progressing     Problem: Discharge Planning  Goal: Discharge to home or other facility with appropriate resources  Outcome: Progressing     Problem: Chronic Conditions and Co-morbidities  Goal: Patient's chronic conditions and co-morbidity symptoms are monitored and maintained or improved  Outcome: Progressing     Problem: Fall/Injury  Goal: Not fall by end of shift  Outcome: Progressing  Goal: Be free from injury by end of the shift  Outcome: Progressing  Goal: Verbalize understanding of personal risk factors for fall in the hospital  Outcome: Progressing  Goal: Verbalize understanding of risk factor reduction measures to prevent injury from fall in the home  Outcome: Progressing  Goal: Use assistive devices by end of the shift  Outcome: Progressing  Goal: Pace activities to prevent fatigue by end of the shift  Outcome: Progressing

## 2024-11-12 NOTE — PROGRESS NOTES
Subjective   Seen today for follow up visit. Patient is feeling well and looking forward to discharge today.        Objective     Current Facility-Administered Medications   Medication Dose Route Frequency Provider Last Rate Last Admin    acetaminophen (Tylenol) tablet 975 mg  975 mg oral q8h Roger Gaytan MD   975 mg at 11/12/24 0844    bisacodyl (Dulcolax) suppository 10 mg  10 mg rectal Daily PRN Briseyda Ames PA-C        cyanocobalamin (Vitamin B-12) tablet 1,000 mcg  1,000 mcg oral Daily Roger Gaytan MD   1,000 mcg at 11/12/24 0844    docusate sodium (Colace) capsule 100 mg  100 mg oral BID Thuan Thibodeaux PA-C   100 mg at 11/12/24 0843    enoxaparin (Lovenox) syringe 30 mg  30 mg subcutaneous BID Melba Castillo MD   30 mg at 11/12/24 0845    finasteride (Proscar) tablet 5 mg  5 mg oral Daily Thuan Thibodeaux PA-C   5 mg at 11/12/24 0844    furosemide (Lasix) tablet 20 mg  20 mg oral Daily Thuan Thibodeaux PA-C   20 mg at 11/12/24 0844    lidocaine 4 % patch 1 patch  1 patch transdermal Daily Melba Castillo MD   1 patch at 11/12/24 0845    melatonin tablet 9 mg  9 mg oral Nightly Roger Gaytan MD   9 mg at 11/11/24 2038    metoprolol tartrate (Lopressor) tablet 12.5 mg  12.5 mg oral BID Albino León MD   12.5 mg at 11/12/24 0844    naloxone (Narcan) injection 0.2 mg  0.2 mg intravenous q5 min PRN Roger Gaytan MD        oxyCODONE (Roxicodone) immediate release tablet 2.5 mg  2.5 mg oral q8h PRN Thuan Thibodeaux PA-C        oxyCODONE (Roxicodone) immediate release tablet 5 mg  5 mg oral Nightly Roger Gaytan MD   5 mg at 11/11/24 2040    oxyCODONE (Roxicodone) immediate release tablet 5 mg  5 mg oral q8h PRN Thuan Thibodeaux PA-C   5 mg at 11/12/24 0844    polyethylene glycol (Glycolax, Miralax) packet 17 g  17 g oral Daily Edmundo Villa DO   17 g at 11/12/24 0846    QUEtiapine (SEROquel) tablet 12.5 mg  12.5 mg oral Nightly Thuan Thibodeaux PA-C        sennosides  (Senokot) tablet 8.6 mg  1 tablet oral BID Thuan G BRIAN Thibodeaux   8.6 mg at 11/12/24 0844    tamsulosin (Flomax) 24 hr capsule 0.4 mg  0.4 mg oral Nightly Roger Gaytan MD   0.4 mg at 11/11/24 2038       Physical Exam  HENT:      Head: Normocephalic.      Ears:      Comments: Hearing intact      Nose: Nose normal.      Mouth/Throat:      Mouth: Mucous membranes are moist.   Cardiovascular:      Rate and Rhythm: Normal rate and regular rhythm.   Pulmonary:      Effort: Pulmonary effort is normal.      Breath sounds: Normal breath sounds.   Abdominal:      Palpations: Abdomen is soft.   Skin:     Findings: Bruising present.      Comments: Improving ecchymosis of left flank extending to abdomen    Neurological:      Mental Status: He is alert.      Comments: Oriented to person, place, month, year, but not day/date    Psychiatric:         Mood and Affect: Mood normal.         Confusion Assessment Method(CAM) for diagnosis of delirium:    1.  Acute onset or fluctuating course: absent  2.  Inattention: absent  3.  Disorganized thinking: absent  4.  Altered level of consciousness: absent  CAM: negative    AT Score For Assessment of Delirium and Cognitive Impairment:    Alertness: 0  Normal(fully alert,but not agitated, throughout assessment)=0  Mild sleepiness for <10 seconds after walking, then normal=0  Clearly abnormal=4  2.  AMT4: 2  No mistakes=0  One mistake=1  Two or more mistakes/untestable=2  3.  Attention: 0  Achieves seven months or more correctly=0  Starts but scores <7 months/ refuses to start=1  Untestable(cannot start because unwell, drowsy, inattentive)=2  4.  Acute: 0  No=0  Yes=4    Total Score: 2  4 or above: Possible delirium +/- cognitive impairment  1-3: Possible cognitive impairment  0: Delirium or severe cognitive impairment unlikely(but delirium still possible if (4) information incomplete)      Last Recorded Vitals      11/11/2024    11:30 AM 11/11/2024    11:38 AM 11/11/2024     3:58 PM  11/11/2024     9:00 PM 11/12/2024    12:16 AM 11/12/2024     4:40 AM 11/12/2024     8:04 AM   Vitals   Systolic 106 106 108 116 114 136 135   Diastolic 60 60 50 58 55 65 68   Heart Rate 75 75 80 83 82 83 59   Temp  36.8 °C (98.2 °F) 36.8 °C (98.2 °F) 36 °C (96.8 °F) 36.4 °C (97.5 °F) 36.8 °C (98.2 °F) 36.3 °C (97.3 °F)   Resp  18 18 18 17 18 18      Vitals:    10/31/24 0554   Weight: 65.8 kg (145 lb)        Relevant Results  Lab Results   Component Value Date    TSH 1.47 09/26/2024    [id number] 755 11/06/2024    VITD25 41 11/01/2024     Results from last 7 days   Lab Units 11/11/24  0803 11/07/24  0630   WBC AUTO x10*3/uL  --  9.5   HEMOGLOBIN g/dL  --  9.2*   HEMATOCRIT %  --  29.1*   SODIUM mmol/L 141 143   POTASSIUM mmol/L 4.6 3.5   CHLORIDE mmol/L 110* 109*   CREATININE mg/dL 1.04 1.08   BUN mg/dL 31* 28*   CO2 mmol/L 27 25          XR chest 1 view  Narrative: Interpreted By:  Cezar Shields and Omar Mahmoud   STUDY:  XR CHEST 1 VIEW;  11/11/2024 5:36 am      INDICATION:  Signs/Symptoms:L effusion/edema, comparison.          COMPARISON:  Chest x-ray 11/10/2024 9:08 a.m., CT chest 11/03/2024      ACCESSION NUMBER(S):  [id number]      ORDERING CLINICIAN:  KRYSTINA SMITH      FINDINGS:  AP radiograph of the chest was provided.      Patient is mildly rotated to the left which limits evaluation and  comparison.      CARDIOMEDIASTINAL SILHOUETTE:  Cardiomediastinal silhouette is stable in size and configuration.  Aortic knob calcification.      LUNGS:  Mild increase in hazy lower predominant gradient opacity within the  left hemithorax. Similar left basilar subsegmental atelectasis.  Similar elevation of the left hemidiaphragm. Stable blunting of the  left costophrenic angle. Linear opacities within the medial right  lower lung field, mildly increased as compared to prior. Right  costophrenic angle is clear. There is no evidence of pneumothorax.      ABDOMEN:  No remarkable upper abdominal findings.      BONES:  No  acute osseous changes.      Impression: 1. Mild increase in layering left pleural effusion with similar left  hemidiaphragm elevation.  2. Mild increase in bibasilar subsegmental atelectasis.      I personally reviewed the images/study and I agree with the findings  as stated by Hue Cabral MD (PGY-2). This study was interpreted at  [place], [place].      MACRO:  None      Signed by: Cezar Shields 11/11/2024 1:19 PM  Dictation workstation:   [id number]  XR chest 1 view  Narrative: Interpreted By:  Cezar Shields  and Jarrod Boykin   STUDY:  XR CHEST 1 VIEW;  11/10/2024 9:08 am      INDICATION:  Signs/Symptoms:resp failure, comparison.      COMPARISON:  Chest radiograph 11/09/2024, CT chest 11/03/2024      ACCESSION NUMBER(S):  [id number]      ORDERING CLINICIAN:  KRYSTINA SMITH      FINDINGS:  AP radiograph of the chest was provided.      CARDIOMEDIASTINAL SILHOUETTE:  Cardiomediastinal silhouette is stable in size and configuration.  Left heart border is obscured by the left hemithorax opacification.      LUNGS:  No pneumothorax there is similar diffuse left basilar opacification  that silhouettes the left hemidiaphragm, left costophrenic angle, and  left heart border. There is improved right-sided blunting of the  costophrenic angle. Similar perihilar edema..      ABDOMEN:  No remarkable upper abdominal findings.      BONES:  No acute osseous changes.      Impression: 1. Allowing for differences in technique, similar moderate left-sided  pleural effusion with associated atelectasis/consolidation. Decreased  right-sided pleural effusion.  2. Stable findings of pulmonary interstitial edema.      I personally reviewed the images/study and I agree with the findings  as stated by Ashutosh Garay MD. This study was interpreted at  [facility], [place], [place].      MACRO:  None      Signed by: Cezar Shields 11/11/2024 8:06 AM  Dictation  workstation:   [id number]          DATA:  EKG: QTC  Encounter Date: 10/30/24   ECG 12 lead   Result Value    Ventricular Rate 86    Atrial Rate 86    NE Interval 190    QRS Duration 124    QT Interval 366    QTC Calculation(Bazett) 437    P Axis 24    R Axis -42    T Axis -74    QRS Count 14    Q Onset 225    P Onset 130    P Offset 189    T Offset 408    QTC Fredericia 412    Narrative    Normal sinus rhythm  Left axis deviation  Nonspecific intraventricular conduction delay  T wave abnormality, consider inferior ischemia  Abnormal ECG  When compared with ECG of 14-SEP-2024 17:26,  Nonspecific intraventricular conduction delay has replaced Incomplete right bundle branch block  Borderline criteria for Lateral infarct are no longer Present  See ED provider note for full interpretation and clinical correlation  Confirmed by Lucía Galindo ([id number]) on 11/1/2024 12:54:02 PM      Anti-psychotics in 48 hours: quetiapine   Opioids/Benzodiazepines in 48 hours: none   Anticholinergics on board:No  Restraints:No  Indwelling catheters:No  Last BM:   UO in 24 hours: voiding well   Activity in the past 24 hours: PT/OT   Need for ambulatory devices: walker           Assessment/Plan     89 y.o. year old male, with past medical history relevant for recurrent UTIs, BPH, colon ca s/p resection, poor vision, gait difficulty, htn, presenting for falls w L multiple rib fx, large posterior chest/abd wall hematoma, L ptx/rosalio, s/p L pigtail CT placement 10/31/24, being seen in geriatric consultation for comanagement, currently being followed for acute delirium. Patient is significantly improved in mentation, back to baseline, ok for discharge today. Recommend limiting anticholinergics for discharge and weaning off of antipsychotics.     Principal Problem:    Hemopneumothorax on left    1. Resolved delirium  - CAM negative, 4 AT: 2  - Ensure regular bowel movements   - Continue melatonin  - Taper quetiapine to 12.5 mg at bedtime x 3  nights, then discontinue   - Maintain delirium precautions     2. Rib fracture, large abdominal wall hematoma, s/p L CT placement for hemopneumothorax  - Pain well controlled with acetaminophen and lidocaine patches     3. BPH/history of urinary incontinence  - Recommend discontinuing oxybutynin due to anti-cholinergic side effects. If medication is needed, recommend trial of mirabegron      4M AGE-FRIENDLY INITIATIVE:  What matters most to patient: Not to fall   Medications: quetiapine, oxycodone   Mentation: CAM negative, 4 AT: 2  Mobility: walker     Geriatric medicine will continue to follow the patient. Thank you for allowing geriatric medicine to be involved in the care of your patient. Geriatric medicine consultation team is available during work hours Monday through Friday. For any emergency issues requiring immediate assistance over the weekend, please page Geriatrics pager [phone]    GUILLERMO Collazo-CNP

## 2024-11-12 NOTE — DISCHARGE SUMMARY
Discharge Diagnosis  Hemopneumothorax on left  Left rib fractures  L1 compression fracture  Complicated UTI, s/p treatment    Issues Requiring Follow-Up  Mult comorbidites, holding some home meds at DC for anticholinergic properties/blood pressure controlled without home antihypertensives    Test Results Pending At Discharge  Pending Labs       No current pending labs.            Hospital Course  89M with history of HTN, BPH, and chronic UTISs transferred from OSH on 10/31 for further evaluation after 2 falls at home (presumed syncopal) resulting a left hemopneumothorax, fracture of let 5-7th ribs, and a L1 compression fx.    A left sided pigtail chest tube placed without complication.  Orthopedic spine team consulted, L1 fx deemed chronic.  No spinal precautions or operative interventions indicated at this time.    Was admitted to the TSICU for monitoring.  Not a candidate for a pain block by anesthesia due to location of fractures.   Chest tube placed to water seal 11/1 and transferred from ICU to [facility].  An echocardiogram obtained given falls, showed EF of 51%, abnormal septal motion consistent with intraventricular conduction delay.  Carotid duplex without significant stenosis.  11/2 noted to have some pulmonary edema, diuresed with lasix.  Chest tube inadvertently found to be in abdomen, replaced overnight 11/3 and appropriate placement of new pigtail confirmed 11/4. PT/OT rec mod intensity. Tube eventually removed with stable hemithorax thereafter. Deemed SNF per pt/ot. Weaned to minimal oxygen 1L, chest pain controlled. During stay, finished appropriate abx course for UTI. Resp status stable back on home diuretic, should continue self bronchopulmonary hygiene at facility. Short course oxygen not unreasonable in elderly male with multiple rib fx's with effusion. Trauma fu as needed. Should see home PCP post SNF. No plan to re-drain chest.    Pertinent Physical Exam At Time of Discharge  Physical Exam  GEN: No  acute distress  SKIN: Warm and dry  CARDIO: Rate controlled rhythm  RESP: Nml resp rate 1L NC without resp distress, relatively clear throughout. Prior L pigtail chest tube site healed to skin surface. L chest wall ecchymosis, appropriately tender posterolateral, mild  GI: Soft, NT, ND  : deferred  MSK: PIÑA  EXTREM: No pitting edema  NEURO: Alert and oriented today but has had intermittent confusion during stay, SILTx4, 4+/5 strength throughout  PSYCH: Nml affect  Home Medications     Medication List      START taking these medications     acetaminophen 325 mg tablet; Commonly known as: Tylenol; Take 3 tablets   (975 mg) by mouth every 8 hours for 10 days.   bisacodyl 10 mg suppository; Commonly known as: Dulcolax; Insert 1   suppository (10 mg) into the rectum once daily as needed for constipation.   cyanocobalamin 1,000 mcg tablet; Commonly known as: Vitamin B-12; Take 1   tablet (1,000 mcg) by mouth once daily.   furosemide 20 mg tablet; Commonly known as: Lasix; Take 1 tablet (20 mg)   by mouth once daily.   melatonin 3 mg tablet; Take 3 tablets (9 mg) by mouth once daily at   bedtime.   oxyCODONE 5 mg immediate release tablet; Commonly known as: Roxicodone;   Take 1 tablet (5 mg) by mouth every 8 hours if needed for severe pain (7 -   10) or moderate pain (4 - 6) for up to 3 days.   polyethylene glycol 17 gram/dose powder; Commonly known as: Glycolax,   Miralax; Mix 17 g of powder and drink once daily.   QUEtiapine 25 mg tablet; Commonly known as: SEROquel; Take 0.5 tablets   (12.5 mg) by mouth as needed at bedtime (sleep/agitation).   sennosides 8.6 mg tablet; Commonly known as: Senokot; Take 1 tablet (8.6   mg) by mouth 2 times a day.     CONTINUE taking these medications     alendronate 70 mg tablet; Commonly known as: Fosamax; Take 1 tablet (70   mg) by mouth every 7 days. Take in the morning with a full glass of water,   on an empty stomach, and do not take anything else by mouth or lie down   for the  next 30 min.   ammonium lactate 12 % lotion; Commonly known as: Lac-Hydrin   finasteride 5 mg tablet; Commonly known as: Proscar; TAKE 1 TABLET EVERY   DAY   HEALTHY EYES ORAL   losartan-hydrochlorothiazide 100-25 mg tablet; Commonly known as:   Hyzaar; Take 0.5 tablets by mouth once daily.   metoprolol tartrate 25 mg tablet; Commonly known as: Lopressor; TAKE 1   TABLET EVERY DAY   oxybutynin XL 10 mg 24 hr tablet; Commonly known as: Ditropan-XL; Take 1   tablet (10 mg) by mouth once daily.   potassium chloride CR 20 mEq ER tablet; Commonly known as: Klor-Con M20;   TAKE 1 TABLET EVERY DAY   PRESERVISION AREDS ORAL   SENTRY SENIOR ORAL   tamsulosin 0.4 mg 24 hr capsule; Commonly known as: Flomax; TAKE 1   CAPSULE AT BEDTIME       Outpatient Follow-Up  Future Appointments   Date Time Provider Department Center   11/20/2024  1:15 PM UROLOGY [id number] PROCEDURE ROOM [id number]   3/27/2025  9:00 AM Olegario Leo PA-C [id number] [facility]       Thuan Thibodeaux PA-C

## 2024-11-12 NOTE — CARE PLAN
The patient's goals for the shift include      The clinical goals for the shift include free from falls

## 2024-11-15 ENCOUNTER — LAB REQUISITION (OUTPATIENT)
Dept: LAB | Facility: HOSPITAL | Age: 89
End: 2024-11-15
Payer: MEDICARE

## 2024-11-15 DIAGNOSIS — S22.42XD MULTIPLE FRACTURES OF RIBS, LEFT SIDE, SUBSEQUENT ENCOUNTER FOR FRACTURE WITH ROUTINE HEALING: ICD-10-CM

## 2024-11-15 DIAGNOSIS — I12.9 HYPERTENSIVE CHRONIC KIDNEY DISEASE WITH STAGE 1 THROUGH STAGE 4 CHRONIC KIDNEY DISEASE, OR UNSPECIFIED CHRONIC KIDNEY DISEASE: ICD-10-CM

## 2024-11-15 LAB
ALBUMIN SERPL BCP-MCNC: 2.6 G/DL (ref 3.4–5)
ALP SERPL-CCNC: 150 U/L (ref 33–136)
ALT SERPL W P-5'-P-CCNC: 53 U/L (ref 10–52)
ANION GAP SERPL CALC-SCNC: 12 MMOL/L (ref 10–20)
AST SERPL W P-5'-P-CCNC: 56 U/L (ref 9–39)
BILIRUB SERPL-MCNC: 1.1 MG/DL (ref 0–1.2)
BUN SERPL-MCNC: 42 MG/DL (ref 6–23)
CALCIUM SERPL-MCNC: 7.7 MG/DL (ref 8.6–10.3)
CHLORIDE SERPL-SCNC: 111 MMOL/L (ref 98–107)
CO2 SERPL-SCNC: 24 MMOL/L (ref 21–32)
CREAT SERPL-MCNC: 1.15 MG/DL (ref 0.5–1.3)
EGFRCR SERPLBLD CKD-EPI 2021: 62 ML/MIN/1.73M*2
ERYTHROCYTE [DISTWIDTH] IN BLOOD BY AUTOMATED COUNT: 17.8 % (ref 11.5–14.5)
GLUCOSE SERPL-MCNC: 79 MG/DL (ref 74–99)
HCT VFR BLD AUTO: 26.1 % (ref 41–52)
HGB BLD-MCNC: 7.7 G/DL (ref 13.5–17.5)
MCH RBC QN AUTO: 28.4 PG (ref 26–34)
MCHC RBC AUTO-ENTMCNC: 29.5 G/DL (ref 32–36)
MCV RBC AUTO: 96 FL (ref 80–100)
NRBC BLD-RTO: 0 /100 WBCS (ref 0–0)
PLATELET # BLD AUTO: 374 X10*3/UL (ref 150–450)
POTASSIUM SERPL-SCNC: 4.5 MMOL/L (ref 3.5–5.3)
PROT SERPL-MCNC: 5.5 G/DL (ref 6.4–8.2)
RBC # BLD AUTO: 2.71 X10*6/UL (ref 4.5–5.9)
SODIUM SERPL-SCNC: 142 MMOL/L (ref 136–145)
WBC # BLD AUTO: 8.2 X10*3/UL (ref 4.4–11.3)

## 2024-11-15 PROCEDURE — 80053 COMPREHEN METABOLIC PANEL: CPT | Mod: OUT | Performed by: INTERNAL MEDICINE

## 2024-11-15 PROCEDURE — 85027 COMPLETE CBC AUTOMATED: CPT | Mod: OUT | Performed by: INTERNAL MEDICINE

## 2024-11-15 PROCEDURE — 36415 COLL VENOUS BLD VENIPUNCTURE: CPT | Mod: OUT | Performed by: INTERNAL MEDICINE

## 2024-11-17 ENCOUNTER — NURSING HOME VISIT (OUTPATIENT)
Dept: POST ACUTE CARE | Facility: EXTERNAL LOCATION | Age: 89
End: 2024-11-17
Payer: MEDICARE

## 2024-11-17 DIAGNOSIS — J94.2 HEMOPNEUMOTHORAX: Primary | ICD-10-CM

## 2024-11-17 PROCEDURE — 99304 1ST NF CARE SF/LOW MDM 25: CPT | Performed by: INTERNAL MEDICINE

## 2024-11-17 NOTE — LETTER
Patient: Tommy Richmond  : 1935    Encounter Date: 2024    Pt was seen in the NH.  Pt is 89 yo male with PMH HTN HLD CKD here for rehab after fall rib fracture with hemopneumothorax, has been getteing agitated when his son is not here  General appearance: Comfortable, no distress  ROS: No SOB  Medications reviewed  Head: Normal  Neck: Soft  Heart: Regular  Lungs: Clear  Abdomen: soft    Plan:   1) PT OT  2) To increase seroquel 25 mg daily for agitation    Problem List Items Addressed This Visit    None  Visit Diagnoses       Hemopneumothorax    -  Primary               Electronically Signed By: Ziggy Galvez MD   24  7:06 PM

## 2024-11-18 NOTE — PROGRESS NOTES
Pt was seen in the NH.  Pt is 88 yo male with PMH HTN HLD CKD here for rehab after fall rib fracture with hemopneumothorax, has been getteing agitated when his son is not here  General appearance: Comfortable, no distress  ROS: No SOB  Medications reviewed  Head: Normal  Neck: Soft  Heart: Regular  Lungs: Clear  Abdomen: soft    Plan:   1) PT OT  2) To increase seroquel 25 mg daily for agitation    Problem List Items Addressed This Visit    None  Visit Diagnoses       Hemopneumothorax    -  Primary

## 2024-11-19 ENCOUNTER — LAB REQUISITION (OUTPATIENT)
Dept: LAB | Facility: HOSPITAL | Age: 89
End: 2024-11-19
Payer: MEDICARE

## 2024-11-19 DIAGNOSIS — D62 ACUTE POSTHEMORRHAGIC ANEMIA: ICD-10-CM

## 2024-11-19 LAB
HCT VFR BLD AUTO: 27.2 % (ref 41–52)
HGB BLD-MCNC: 8 G/DL (ref 13.5–17.5)

## 2024-11-19 PROCEDURE — 36415 COLL VENOUS BLD VENIPUNCTURE: CPT | Mod: OUT | Performed by: NURSE PRACTITIONER

## 2024-11-19 PROCEDURE — 85014 HEMATOCRIT: CPT | Mod: OUT | Performed by: NURSE PRACTITIONER

## 2024-11-20 ENCOUNTER — LAB REQUISITION (OUTPATIENT)
Dept: LAB | Facility: HOSPITAL | Age: 89
End: 2024-11-20
Payer: MEDICARE

## 2024-11-20 ENCOUNTER — APPOINTMENT (OUTPATIENT)
Dept: UROLOGY | Facility: CLINIC | Age: 89
End: 2024-11-20
Payer: MEDICARE

## 2024-11-20 DIAGNOSIS — N40.1 BENIGN PROSTATIC HYPERPLASIA WITH URINARY OBSTRUCTION AND OTHER LOWER URINARY TRACT SYMPTOMS: ICD-10-CM

## 2024-11-20 DIAGNOSIS — N13.8 BENIGN PROSTATIC HYPERPLASIA WITH URINARY OBSTRUCTION AND OTHER LOWER URINARY TRACT SYMPTOMS: ICD-10-CM

## 2024-11-20 DIAGNOSIS — Z13.9 ENCOUNTER FOR SCREENING, UNSPECIFIED: ICD-10-CM

## 2024-11-20 LAB
APPEARANCE UR: ABNORMAL
BILIRUB UR STRIP.AUTO-MCNC: NEGATIVE MG/DL
COLOR UR: YELLOW
GLUCOSE UR STRIP.AUTO-MCNC: NORMAL MG/DL
KETONES UR STRIP.AUTO-MCNC: NEGATIVE MG/DL
LEUKOCYTE ESTERASE UR QL STRIP.AUTO: ABNORMAL
MUCOUS THREADS #/AREA URNS AUTO: ABNORMAL /LPF
NITRITE UR QL STRIP.AUTO: NEGATIVE
PH UR STRIP.AUTO: 5 [PH]
PROT UR STRIP.AUTO-MCNC: ABNORMAL MG/DL
RBC # UR STRIP.AUTO: ABNORMAL /UL
RBC #/AREA URNS AUTO: >20 /HPF
SP GR UR STRIP.AUTO: 1.02
UROBILINOGEN UR STRIP.AUTO-MCNC: NORMAL MG/DL
WBC #/AREA URNS AUTO: ABNORMAL /HPF

## 2024-11-20 PROCEDURE — 81001 URINALYSIS AUTO W/SCOPE: CPT | Mod: OUT | Performed by: NURSE PRACTITIONER

## 2024-11-20 PROCEDURE — 87086 URINE CULTURE/COLONY COUNT: CPT | Mod: OUT,SAMLAB | Performed by: NURSE PRACTITIONER

## 2024-11-20 RX ORDER — TAMSULOSIN HYDROCHLORIDE 0.4 MG/1
0.4 CAPSULE ORAL NIGHTLY
Qty: 90 CAPSULE | Refills: 3 | Status: SHIPPED | OUTPATIENT
Start: 2024-11-20

## 2024-11-20 RX ORDER — FINASTERIDE 5 MG/1
5 TABLET, FILM COATED ORAL DAILY
Qty: 90 TABLET | Refills: 3 | Status: SHIPPED | OUTPATIENT
Start: 2024-11-20

## 2024-11-21 LAB — BACTERIA UR CULT: NORMAL

## 2024-11-24 ENCOUNTER — NURSING HOME VISIT (OUTPATIENT)
Dept: POST ACUTE CARE | Facility: EXTERNAL LOCATION | Age: 89
End: 2024-11-24
Payer: MEDICARE

## 2024-11-24 DIAGNOSIS — R45.1 AGITATION: Primary | ICD-10-CM

## 2024-11-24 PROCEDURE — 99308 SBSQ NF CARE LOW MDM 20: CPT | Performed by: INTERNAL MEDICINE

## 2024-11-24 NOTE — PROGRESS NOTES
Pt was seen in the NH.  Pt is in usual state , no complaint, staff reports off and on agitations  General appearance: Comfortable, no distress  ROS: No SOB  Medications reviewed  Head: Normal  Neck: Soft  Heart: Regular  Lungs: Clear  Abdomen: soft    Plan:   1)clinically doing fine  2) To continue hydroxyzine q 8 hr prn and will watch    Problem List Items Addressed This Visit    None  Visit Diagnoses       Agitation    -  Primary

## 2024-11-24 NOTE — LETTER
Patient: Tommy Richmond  : 1935    Encounter Date: 2024    Pt was seen in the NH.  Pt is in usual state , no complaint, staff reports off and on agitations  General appearance: Comfortable, no distress  ROS: No SOB  Medications reviewed  Head: Normal  Neck: Soft  Heart: Regular  Lungs: Clear  Abdomen: soft    Plan:   1)clinically doing fine  2) To continue hydroxyzine q 8 hr prn and will watch    Problem List Items Addressed This Visit    None  Visit Diagnoses       Agitation    -  Primary               Electronically Signed By: Ziggy Galvez MD   24  1:15 PM

## 2024-11-25 ENCOUNTER — TELEPHONE (OUTPATIENT)
Dept: PRIMARY CARE | Facility: CLINIC | Age: 89
End: 2024-11-25
Payer: MEDICARE

## 2024-11-25 NOTE — TELEPHONE ENCOUNTER
Pt is currently at [facility] for rehab, they noticed a sore,boil  like the one  you treated before, they were going to prescribe an antibiotic but concerned of the penicillin allergy- what antibiotic did you treat the recent boil with?

## 2024-11-26 NOTE — PROGRESS NOTES
Patient ID: Tommy Richmond is a 89 y.o. male.    Procedures  The patient was prepped and draped in the standard fashion.  Lidociane jelly was used on the urethral   Meatus.  A 16 Greenlandic red rubber catheter was placed into the bladder.  The bladder was drained.  The bladder was  Then irrigated several times until clear without mucous.  Aprroximately 60ml of a saline and _______BETADINE__________  Solutions was left in the bladder.  The catheter was removed and patient  was asked to hold the solution in the  Bladder as long as possible      FOLLOW UP BETADINE IRRIGATION IN 1 MONTH

## 2024-11-27 ENCOUNTER — TELEPHONE (OUTPATIENT)
Dept: PRIMARY CARE | Facility: CLINIC | Age: 89
End: 2024-11-27
Payer: MEDICARE

## 2024-11-27 NOTE — TELEPHONE ENCOUNTER
Son called stating [facility] didn't receive the paperwork for this patient. He picked up the paperwork yesterday and took it there, but they told him it's not complete.   In addition to assisted living, they need Olegario to approve the memory care for this patient to be able to be with his wife. Also need last office notes and med list with need Olegario's signature.    He is scheduled to move there Monday, so asking to have this done ASA Monday morning.   Asking for a call back when this message is received. [phone]

## 2024-11-27 NOTE — TELEPHONE ENCOUNTER
Spoke to son and let him know PCP was out of the office, but this would get taken care of then. He expressed a verbal understanding and nothing further needed at this time.

## 2024-12-04 ENCOUNTER — LAB REQUISITION (OUTPATIENT)
Dept: LAB | Facility: HOSPITAL | Age: 89
End: 2024-12-04
Payer: MEDICARE

## 2024-12-04 ENCOUNTER — OFFICE VISIT (OUTPATIENT)
Dept: UROLOGY | Facility: CLINIC | Age: 89
End: 2024-12-04
Payer: MEDICARE

## 2024-12-04 ENCOUNTER — APPOINTMENT (OUTPATIENT)
Dept: UROLOGY | Facility: CLINIC | Age: 89
End: 2024-12-04
Payer: MEDICARE

## 2024-12-04 DIAGNOSIS — N39.0 RECURRENT UTI: ICD-10-CM

## 2024-12-04 DIAGNOSIS — I12.9 HYPERTENSIVE CHRONIC KIDNEY DISEASE WITH STAGE 1 THROUGH STAGE 4 CHRONIC KIDNEY DISEASE, OR UNSPECIFIED CHRONIC KIDNEY DISEASE: ICD-10-CM

## 2024-12-04 LAB
ANION GAP SERPL CALC-SCNC: 9 MMOL/L (ref 10–20)
BUN SERPL-MCNC: 26 MG/DL (ref 6–23)
CALCIUM SERPL-MCNC: 7.8 MG/DL (ref 8.6–10.3)
CHLORIDE SERPL-SCNC: 105 MMOL/L (ref 98–107)
CO2 SERPL-SCNC: 28 MMOL/L (ref 21–32)
CREAT SERPL-MCNC: 1.04 MG/DL (ref 0.5–1.3)
EGFRCR SERPLBLD CKD-EPI 2021: 69 ML/MIN/1.73M*2
GLUCOSE SERPL-MCNC: 72 MG/DL (ref 74–99)
POTASSIUM SERPL-SCNC: 3.8 MMOL/L (ref 3.5–5.3)
SODIUM SERPL-SCNC: 138 MMOL/L (ref 136–145)

## 2024-12-04 PROCEDURE — 51700 IRRIGATION OF BLADDER: CPT | Performed by: UROLOGY

## 2024-12-04 PROCEDURE — 36415 COLL VENOUS BLD VENIPUNCTURE: CPT | Mod: OUT | Performed by: INTERNAL MEDICINE

## 2024-12-04 PROCEDURE — 1111F DSCHRG MED/CURRENT MED MERGE: CPT | Performed by: UROLOGY

## 2024-12-04 PROCEDURE — 80048 BASIC METABOLIC PNL TOTAL CA: CPT | Mod: OUT | Performed by: INTERNAL MEDICINE

## 2024-12-04 NOTE — PROGRESS NOTES
Patient ID: Tommy Richmond is a 89 y.o. male.    Irrigation of Bladder    Date/Time: 12/4/2024 3:05 PM    Performed by: Jaida Cline MA  Authorized by: Chuck Cavanaugh MD        The patient was prepped and draped in the standard fashion.  Lidociane jelly was used on the urethral   Meatus.  A 16 Angolan red rubber catheter was placed into the bladder.  The bladder was drained.  The bladder was  Then irrigated several times until clear without mucous.  Aprroximately 60ml of a saline and _______BETADINE__________  Solutions was left in the bladder.  The catheter was removed and patient  was asked to hold the solution in the  Bladder as long as possible      FOLLOW UP BETADINE IRRIGATION IN 1 MONTH.

## 2024-12-10 ENCOUNTER — TELEPHONE (OUTPATIENT)
Dept: PRIMARY CARE | Facility: CLINIC | Age: 89
End: 2024-12-10
Payer: MEDICARE

## 2024-12-10 ENCOUNTER — PATIENT OUTREACH (OUTPATIENT)
Dept: PRIMARY CARE | Facility: CLINIC | Age: 89
End: 2024-12-10
Payer: MEDICARE

## 2024-12-10 NOTE — PROGRESS NOTES
Discharge Facility: [facility]  Discharge Diagnosis: SNF to AL  Admission Date:  11/2/24  Discharge Date:  12/9/24    PCP Appointment Date: Message sent to Dr Leo office  Specialist Appointment Date:   JAN 8 2025 09:00 AM - UROLOGY NURSE VISIT  [facility] - UROLOGY [place] [address] NURSE     Hospital Encounter and Summary Linked: no    Two attempts were made to reach patient within two business days after discharge. Voicemail left with contact information for patient to call back with any non-emergent questions or concerns.

## 2024-12-11 ENCOUNTER — TELEPHONE (OUTPATIENT)
Dept: PRIMARY CARE | Facility: CLINIC | Age: 89
End: 2024-12-11
Payer: MEDICARE

## 2024-12-11 NOTE — TELEPHONE ENCOUNTER
Leigha from [facility] stating they got orders for PT and OT. Asking if Olegario will follow and sign for these orders. Asking for a call back. [phone]

## 2024-12-12 NOTE — TELEPHONE ENCOUNTER
Spoke to Leigha and let her know PCP would follow orders. Expressed a verbal understanding and nothing further needed at this time.

## 2024-12-19 NOTE — PROGRESS NOTES
Patient ID: Tommy Richmond is a 89 y.o. male.    Procedures  The patient was prepped and draped in the standard fashion.  Lidociane jelly was used on the urethral   Meatus.  A 16 Yi red rubber catheter was placed into the bladder.  The bladder was drained.  The bladder was  Then irrigated several times until clear without mucous.  Aprroximately 60ml of a saline and ___BETADINE______________  Solutions was left in the bladder.  The catheter was removed and patient  was asked to hold the solution in the  Bladder as long as possible      FOLLOW UP BETADINE IRRIGATION IN 1 MONTH.

## 2024-12-20 ENCOUNTER — TELEPHONE (OUTPATIENT)
Dept: PRIMARY CARE | Facility: CLINIC | Age: 89
End: 2024-12-20
Payer: MEDICARE

## 2024-12-20 NOTE — TELEPHONE ENCOUNTER
Vane from [facility] PT - pt has been evaluated and they recommend 2 x week for 4 week then 1 x week for 4 weeks - strength training , balance    Checking to see  if you are agreeable to sign orders     [phone]

## 2024-12-24 ENCOUNTER — PATIENT OUTREACH (OUTPATIENT)
Dept: CARDIOLOGY | Facility: CLINIC | Age: 89
End: 2024-12-24
Payer: MEDICARE

## 2024-12-30 ENCOUNTER — TELEPHONE (OUTPATIENT)
Dept: PRIMARY CARE | Facility: CLINIC | Age: 89
End: 2024-12-30
Payer: MEDICARE

## 2024-12-30 DIAGNOSIS — L08.9 SKIN INFECTION: Primary | ICD-10-CM

## 2024-12-30 DIAGNOSIS — L08.9 SKIN INFECTION: ICD-10-CM

## 2024-12-30 RX ORDER — SULFAMETHOXAZOLE AND TRIMETHOPRIM 800; 160 MG/1; MG/1
1 TABLET ORAL 2 TIMES DAILY
Qty: 14 TABLET | Refills: 0 | Status: SHIPPED | OUTPATIENT
Start: 2024-12-30 | End: 2025-01-06

## 2024-12-30 RX ORDER — SULFAMETHOXAZOLE AND TRIMETHOPRIM 800; 160 MG/1; MG/1
1 TABLET ORAL 2 TIMES DAILY
Qty: 14 TABLET | Refills: 0 | Status: SHIPPED | OUTPATIENT
Start: 2024-12-30 | End: 2024-12-30 | Stop reason: SDUPTHER

## 2024-12-30 NOTE — TELEPHONE ENCOUNTER
Nan from [facility] received fax back and Olegario had asked how patients abrasion on left lower leg was looking.  She said it's warm, red, has exudate and is swollen.  Patient states it's a cyst that has opened before, but she isn't sure he really knows.  Both legs are swollen, not sure if related to that or not.    Please advise

## 2024-12-31 ENCOUNTER — TELEPHONE (OUTPATIENT)
Dept: PRIMARY CARE | Facility: CLINIC | Age: 89
End: 2024-12-31
Payer: MEDICARE

## 2024-12-31 NOTE — TELEPHONE ENCOUNTER
Claudette from [facility]- they would like a verbal ok to add nursing on to his orders- [facility] assisted [facility] requested they come an evaluate cyst   [phone]

## 2025-01-02 ENCOUNTER — APPOINTMENT (OUTPATIENT)
Dept: PRIMARY CARE | Facility: CLINIC | Age: 89
End: 2025-01-02
Payer: MEDICARE

## 2025-01-03 ENCOUNTER — OFFICE VISIT (OUTPATIENT)
Dept: PRIMARY CARE | Facility: CLINIC | Age: 89
End: 2025-01-03
Payer: MEDICARE

## 2025-01-03 VITALS
WEIGHT: 144.6 LBS | HEIGHT: 66 IN | OXYGEN SATURATION: 93 % | DIASTOLIC BLOOD PRESSURE: 87 MMHG | SYSTOLIC BLOOD PRESSURE: 144 MMHG | BODY MASS INDEX: 23.24 KG/M2 | HEART RATE: 74 BPM

## 2025-01-03 DIAGNOSIS — L03.116 CELLULITIS OF LEFT LOWER EXTREMITY: Primary | ICD-10-CM

## 2025-01-03 DIAGNOSIS — F51.01 PRIMARY INSOMNIA: ICD-10-CM

## 2025-01-03 PROCEDURE — 1160F RVW MEDS BY RX/DR IN RCRD: CPT

## 2025-01-03 PROCEDURE — 99214 OFFICE O/P EST MOD 30 MIN: CPT

## 2025-01-03 PROCEDURE — 1159F MED LIST DOCD IN RCRD: CPT

## 2025-01-03 PROCEDURE — 1036F TOBACCO NON-USER: CPT

## 2025-01-03 PROCEDURE — 3079F DIAST BP 80-89 MM HG: CPT

## 2025-01-03 PROCEDURE — 3077F SYST BP >= 140 MM HG: CPT

## 2025-01-03 RX ORDER — LOSARTAN POTASSIUM AND HYDROCHLOROTHIAZIDE 12.5; 5 MG/1; MG/1
TABLET ORAL
COMMUNITY
Start: 2024-12-10 | End: 2025-01-03 | Stop reason: ALTCHOICE

## 2025-01-03 RX ORDER — BUSPIRONE HYDROCHLORIDE 5 MG/1
TABLET ORAL
COMMUNITY
Start: 2024-12-10

## 2025-01-03 RX ORDER — TRAZODONE HYDROCHLORIDE 50 MG/1
TABLET ORAL
COMMUNITY
Start: 2024-12-10

## 2025-01-03 RX ORDER — OXYCODONE HYDROCHLORIDE 5 MG/1
5 TABLET ORAL EVERY 8 HOURS PRN
COMMUNITY

## 2025-01-03 ASSESSMENT — ENCOUNTER SYMPTOMS
FATIGUE: 0
DIFFICULTY URINATING: 0
CHEST TIGHTNESS: 0
RECTAL PAIN: 0
NAUSEA: 0
COLOR CHANGE: 1
SHORTNESS OF BREATH: 0
ABDOMINAL PAIN: 0
WEAKNESS: 0
MYALGIAS: 0
TROUBLE SWALLOWING: 0
WOUND: 1
CHILLS: 0
PALPITATIONS: 0
UNEXPECTED WEIGHT CHANGE: 0
NUMBNESS: 0
HEADACHES: 0
POLYPHAGIA: 0
DIARRHEA: 0
FREQUENCY: 0
NERVOUS/ANXIOUS: 0
DIAPHORESIS: 0
POLYDIPSIA: 0
CONSTIPATION: 0
ARTHRALGIAS: 0

## 2025-01-03 NOTE — PROGRESS NOTES
Subjective   Patient ID: Tommy Richmond is a 89 y.o. male who presents for Left leg issues (Pt is here today for issues with his left leg, reports he has a blister and redness, he is taking an abx, denies fever however it is warm to touch. ).    Patient presents today from assisted living for evaluation of left lower leg inflammation.    Cellulitis: Patient symptoms started multiple days ago.  Erythema to anterior distal left lower leg.  Single adherent dressing applied by SNF staff.  Orders written for Bactrim and doxycycline 7 days for coverage.  His son is with him as an additional story and will report resolution of infection or need for additional 7-day coverage.  I also wrote for nursing orders at the nursing home to have daily dressing changes with nonadherent dry sterile dressings.  Patient has no signs or symptoms of systemic infection at this time.     Insomnia: Nursing home paperwork omitted the patient's Seroquel.  He had previously been prescribed 12.5 mg nightly as needed for insomnia.  Has had some intermittent issues since not having the medication.  New order was sent to nursing home for refill of medication and to add to MAR.          Review of Systems   Constitutional:  Negative for chills, diaphoresis, fatigue and unexpected weight change.   HENT:  Negative for dental problem, tinnitus and trouble swallowing.    Eyes:  Negative for visual disturbance.   Respiratory:  Negative for chest tightness and shortness of breath.    Cardiovascular:  Negative for chest pain, palpitations and leg swelling.   Gastrointestinal:  Negative for abdominal pain, constipation, diarrhea, nausea and rectal pain.   Endocrine: Negative for polydipsia, polyphagia and polyuria.   Genitourinary:  Negative for difficulty urinating, frequency and urgency.   Musculoskeletal:  Negative for arthralgias and myalgias.   Skin:  Positive for color change (left lower leg) and wound (left lower leg). Negative for pallor.  "  Neurological:  Negative for syncope, weakness, numbness and headaches.   Psychiatric/Behavioral:  Negative for suicidal ideas. The patient is not nervous/anxious.        Objective   /87   Pulse 74   Ht 1.676 m (5' 6\")   Wt 65.6 kg (144 lb 9.6 oz)   SpO2 93%   BMI 23.34 kg/m²     Physical Exam  Vitals and nursing note reviewed.   Constitutional:       General: He is not in acute distress.     Appearance: Normal appearance. He is normal weight.   HENT:      Head: Normocephalic.      Right Ear: Tympanic membrane, ear canal and external ear normal.      Left Ear: Tympanic membrane, ear canal and external ear normal.      Nose: Nose normal.      Mouth/Throat:      Mouth: Mucous membranes are moist.      Pharynx: Oropharynx is clear.   Eyes:      Pupils: Pupils are equal, round, and reactive to light.   Cardiovascular:      Rate and Rhythm: Normal rate and regular rhythm.      Pulses: Normal pulses.      Heart sounds: Murmur heard.   Pulmonary:      Effort: Pulmonary effort is normal. No respiratory distress.      Breath sounds: Normal breath sounds. No stridor. No wheezing, rhonchi or rales.   Chest:      Chest wall: No tenderness.   Abdominal:      General: Bowel sounds are normal. There is no distension.      Palpations: Abdomen is soft.   Musculoskeletal:         General: Normal range of motion.      Cervical back: Normal range of motion.      Right lower leg: Edema present.      Left lower leg: Edema present.      Comments: Lower extremity edema left greater than right   Skin:     General: Skin is warm and dry.      Capillary Refill: Capillary refill takes less than 2 seconds.      Findings: Erythema (Distal left lower leg) present.   Neurological:      General: No focal deficit present.      Mental Status: He is alert and oriented to person, place, and time. Mental status is at baseline.   Psychiatric:         Mood and Affect: Mood normal.         Behavior: Behavior normal.         Thought Content: " Thought content normal.         Judgment: Judgment normal.         Assessment/Plan   Diagnoses and all orders for this visit:  Cellulitis of left lower extremity  Primary insomnia

## 2025-01-08 ENCOUNTER — APPOINTMENT (OUTPATIENT)
Dept: UROLOGY | Facility: CLINIC | Age: 89
End: 2025-01-08
Payer: MEDICARE

## 2025-01-08 ENCOUNTER — APPOINTMENT (OUTPATIENT)
Dept: PRIMARY CARE | Facility: CLINIC | Age: 89
End: 2025-01-08
Payer: MEDICARE

## 2025-01-08 DIAGNOSIS — N39.0 RECURRENT UTI: ICD-10-CM

## 2025-01-08 PROCEDURE — 51700 IRRIGATION OF BLADDER: CPT | Performed by: UROLOGY

## 2025-01-24 ENCOUNTER — TELEPHONE (OUTPATIENT)
Dept: PRIMARY CARE | Facility: CLINIC | Age: 89
End: 2025-01-24
Payer: MEDICARE

## 2025-01-24 NOTE — TELEPHONE ENCOUNTER
Call from [facility]- pt left lower leg is pink in color, new blister, new open area .1 x .1 no drainage, some lower extremity edema  wondering if you should order tubigrips   [phone]

## 2025-01-27 ENCOUNTER — TELEPHONE (OUTPATIENT)
Dept: PRIMARY CARE | Facility: CLINIC | Age: 89
End: 2025-01-27
Payer: MEDICARE

## 2025-01-27 NOTE — TELEPHONE ENCOUNTER
Message from Michelle with [facility] re: they are seeing him for his wound.  The scab fell off and it's seeping a bit, some redness around the wound, some blistering.  Not hot to touch and he isn't having any fever, but there is some increased swelling in both legs.  Wanting to know if can go with Xeroform with tubi  2x/week and some compression for his lower legs.    Please call her

## 2025-01-30 ENCOUNTER — TELEPHONE (OUTPATIENT)
Dept: PRIMARY CARE | Facility: CLINIC | Age: 89
End: 2025-01-30
Payer: MEDICARE

## 2025-01-30 DIAGNOSIS — R26.2 AMBULATORY DYSFUNCTION: Primary | ICD-10-CM

## 2025-01-30 DIAGNOSIS — R64: ICD-10-CM

## 2025-01-30 NOTE — TELEPHONE ENCOUNTER
Pt son is wondering if his dad would be a candidate for palliative care?  If so then he would like to have referral sent to [facility]

## 2025-01-31 ENCOUNTER — TELEPHONE (OUTPATIENT)
Dept: PRIMARY CARE | Facility: CLINIC | Age: 89
End: 2025-01-31
Payer: MEDICARE

## 2025-01-31 DIAGNOSIS — L03.90 CELLULITIS, UNSPECIFIED CELLULITIS SITE: Primary | ICD-10-CM

## 2025-01-31 RX ORDER — SULFAMETHOXAZOLE AND TRIMETHOPRIM 800; 160 MG/1; MG/1
1 TABLET ORAL 2 TIMES DAILY
Qty: 14 TABLET | Refills: 0 | Status: SHIPPED | OUTPATIENT
Start: 2025-01-31 | End: 2025-02-07

## 2025-02-04 ENCOUNTER — TELEPHONE (OUTPATIENT)
Dept: PRIMARY CARE | Facility: CLINIC | Age: 89
End: 2025-02-04
Payer: MEDICARE

## 2025-02-04 DIAGNOSIS — R41.0 CONFUSION: ICD-10-CM

## 2025-02-04 DIAGNOSIS — R41.0 CONFUSION: Primary | ICD-10-CM

## 2025-02-04 DIAGNOSIS — I10 HYPERTENSION, UNSPECIFIED TYPE: Primary | ICD-10-CM

## 2025-02-04 DIAGNOSIS — Z13.29 SCREENING FOR THYROID DISORDER: ICD-10-CM

## 2025-02-04 NOTE — TELEPHONE ENCOUNTER
Nurse ZAC and said patient had a fall today and was sent to the ER.  She said she is aware the urine test was normal but patient is very confused and not himself.  She is asking if additional lab work is needed.  She said she doesn't think they dave and labs at the ER    Please advise.

## 2025-02-04 NOTE — TELEPHONE ENCOUNTER
Nan nurse from [facility]- [phone]  Pt fell this morning and hit his head , he was bleeding, he was sent to ER , all tests are normal and he is returning to facility today     Just FYI

## 2025-02-05 NOTE — TELEPHONE ENCOUNTER
Message from patients son that he believes they did another urine test on his dad and is wondering if we have results yet.  Stated his dad called him upset, confused and seeing and hearing things that aren't there, so he's pretty certain his dad has a UTI.    Please call him

## 2025-02-06 ENCOUNTER — TELEPHONE (OUTPATIENT)
Dept: PRIMARY CARE | Facility: CLINIC | Age: 89
End: 2025-02-06
Payer: MEDICARE

## 2025-02-06 DIAGNOSIS — R39.9 UTI SYMPTOMS: Primary | ICD-10-CM

## 2025-02-06 RX ORDER — NITROFURANTOIN 25; 75 MG/1; MG/1
100 CAPSULE ORAL 2 TIMES DAILY
Qty: 10 CAPSULE | Refills: 0 | Status: SHIPPED | OUTPATIENT
Start: 2025-02-06 | End: 2025-02-11

## 2025-02-06 NOTE — TELEPHONE ENCOUNTER
Yury, pt son, heard from [facility] that UA came back normal- his dad is showing all of his usual  symptoms for UTI is it possible to start him on an antibiotic?  He is not sure what to do

## 2025-02-07 ENCOUNTER — TELEPHONE (OUTPATIENT)
Dept: PRIMARY CARE | Facility: CLINIC | Age: 89
End: 2025-02-07
Payer: MEDICARE

## 2025-02-07 NOTE — TELEPHONE ENCOUNTER
FYI   Representative from [facility] updated referral - his insurance will only cover 50% of palliative care Yury does not want to do that- they will evaluate him when they see Janette and let us know if he would qualify for hospice which is covered 100%

## 2025-02-07 NOTE — TELEPHONE ENCOUNTER
Ariana from [facility] is requesting referral for hospice - they will need this to evaluate him

## 2025-02-10 ENCOUNTER — TELEPHONE (OUTPATIENT)
Dept: PRIMARY CARE | Facility: CLINIC | Age: 89
End: 2025-02-10
Payer: MEDICARE

## 2025-02-10 DIAGNOSIS — R62.7 FAILURE TO THRIVE IN ADULT: ICD-10-CM

## 2025-02-10 DIAGNOSIS — R53.1 GENERALIZED WEAKNESS: Primary | ICD-10-CM

## 2025-02-10 NOTE — TELEPHONE ENCOUNTER
Pt son wanted us aware that Tommy was seen in the ER for the arm swelling , was dx with pneumonia and is currently in [facility] -

## 2025-02-17 ENCOUNTER — APPOINTMENT (OUTPATIENT)
Dept: UROLOGY | Facility: CLINIC | Age: 89
End: 2025-02-17
Payer: MEDICARE

## 2025-02-17 DIAGNOSIS — M48.00 CENTRAL STENOSIS OF SPINAL CANAL: Primary | ICD-10-CM

## 2025-02-17 DIAGNOSIS — M48.00 CENTRAL STENOSIS OF SPINAL CANAL: ICD-10-CM

## 2025-02-17 RX ORDER — MELOXICAM 7.5 MG/1
7.5 TABLET ORAL 2 TIMES DAILY PRN
Qty: 60 TABLET | Refills: 0 | Status: SHIPPED | OUTPATIENT
Start: 2025-02-17 | End: 2025-02-17 | Stop reason: SDUPTHER

## 2025-02-17 RX ORDER — MELOXICAM 7.5 MG/1
7.5 TABLET ORAL 2 TIMES DAILY PRN
Qty: 4 TABLET | Refills: 0 | Status: SHIPPED | OUTPATIENT
Start: 2025-02-17 | End: 2025-02-18 | Stop reason: SDUPTHER

## 2025-02-17 NOTE — TELEPHONE ENCOUNTER
02/17/25  Luba at [facility] called stating that the patient needs medication lower back pain.  They have been giving him tyloenol, but it is not helping.     Luba Ph: [phone]

## 2025-02-17 NOTE — TELEPHONE ENCOUNTER
[facility] was contacted by [facility] to provided skilled care for pt- will you sign orders?    [phone] -Vane

## 2025-02-17 NOTE — TELEPHONE ENCOUNTER
Yury would like to know if you could send in a short supply of the Meloxicam to a local pharmacy so they can bring it to him tonight? The other pharmacy will not have it to him until sometime tomorrow-[facility]

## 2025-02-18 DIAGNOSIS — M48.061 SPINAL STENOSIS OF LUMBAR REGION, UNSPECIFIED WHETHER NEUROGENIC CLAUDICATION PRESENT: Primary | ICD-10-CM

## 2025-02-18 RX ORDER — MELOXICAM 7.5 MG/1
7.5 TABLET ORAL 2 TIMES DAILY PRN
Qty: 60 TABLET | Refills: 0 | Status: SHIPPED | OUTPATIENT
Start: 2025-02-18 | End: 2025-03-20

## 2025-02-18 RX ORDER — OXYCODONE HYDROCHLORIDE 5 MG/1
5 TABLET ORAL EVERY 6 HOURS PRN
Qty: 24 TABLET | Refills: 0 | Status: SHIPPED | OUTPATIENT
Start: 2025-02-18 | End: 2025-02-24

## 2025-02-18 NOTE — TELEPHONE ENCOUNTER
Pt son Yury called this morning  the meloxicam does not seem to be helping with his dads pain - they stated he was crying out in pain trough out the night,  it looks like he was on a narcotic for pain management but that was discontinued when he was in the hospital- checking to see if that could be re started - please advise

## 2025-02-18 NOTE — TELEPHONE ENCOUNTER
[facility] stated Meloxicam was discontinued by provider- I re proposed the rx- I did let them know your directions are BID PRN for moderate pain

## 2025-03-03 ENCOUNTER — TELEPHONE (OUTPATIENT)
Dept: PRIMARY CARE | Facility: CLINIC | Age: 89
End: 2025-03-03
Payer: MEDICARE

## 2025-03-03 NOTE — TELEPHONE ENCOUNTER
Pt son Yury calling to let us know his dad passed away last Thursday. He thanks you for all you have done to care for him

## 2025-03-27 ENCOUNTER — APPOINTMENT (OUTPATIENT)
Dept: PRIMARY CARE | Facility: CLINIC | Age: 89
End: 2025-03-27
Payer: MEDICARE